# Patient Record
Sex: FEMALE | Race: WHITE | NOT HISPANIC OR LATINO | Employment: OTHER | URBAN - METROPOLITAN AREA
[De-identification: names, ages, dates, MRNs, and addresses within clinical notes are randomized per-mention and may not be internally consistent; named-entity substitution may affect disease eponyms.]

---

## 2018-02-06 ENCOUNTER — OFFICE VISIT CONVERTED (OUTPATIENT)
Dept: INTERNAL MEDICINE | Facility: CLINIC | Age: 78
End: 2018-02-06
Attending: INTERNAL MEDICINE

## 2018-02-16 ENCOUNTER — OFFICE VISIT CONVERTED (OUTPATIENT)
Dept: INTERNAL MEDICINE | Facility: CLINIC | Age: 78
End: 2018-02-16
Attending: INTERNAL MEDICINE

## 2018-02-16 ENCOUNTER — CONVERSION ENCOUNTER (OUTPATIENT)
Dept: INTERNAL MEDICINE | Facility: CLINIC | Age: 78
End: 2018-02-16

## 2018-04-30 ENCOUNTER — CONVERSION ENCOUNTER (OUTPATIENT)
Dept: INTERNAL MEDICINE | Facility: CLINIC | Age: 78
End: 2018-04-30

## 2018-04-30 ENCOUNTER — OFFICE VISIT CONVERTED (OUTPATIENT)
Dept: INTERNAL MEDICINE | Facility: CLINIC | Age: 78
End: 2018-04-30
Attending: INTERNAL MEDICINE

## 2018-07-30 ENCOUNTER — OFFICE VISIT CONVERTED (OUTPATIENT)
Dept: INTERNAL MEDICINE | Facility: CLINIC | Age: 78
End: 2018-07-30
Attending: INTERNAL MEDICINE

## 2018-10-30 ENCOUNTER — CONVERSION ENCOUNTER (OUTPATIENT)
Dept: INTERNAL MEDICINE | Facility: CLINIC | Age: 78
End: 2018-10-30

## 2018-10-30 ENCOUNTER — OFFICE VISIT CONVERTED (OUTPATIENT)
Dept: INTERNAL MEDICINE | Facility: CLINIC | Age: 78
End: 2018-10-30
Attending: INTERNAL MEDICINE

## 2019-02-01 ENCOUNTER — HOSPITAL ENCOUNTER (OUTPATIENT)
Dept: OTHER | Facility: HOSPITAL | Age: 79
Discharge: HOME | End: 2019-02-01
Attending: INTERNAL MEDICINE

## 2019-02-01 ENCOUNTER — OFFICE VISIT CONVERTED (OUTPATIENT)
Dept: INTERNAL MEDICINE | Facility: CLINIC | Age: 79
End: 2019-02-01
Attending: INTERNAL MEDICINE

## 2019-02-01 LAB
ALBUMIN SERPL-MCNC: 4.1 G/DL (ref 3.5–5)
ALBUMIN/GLOB SERPL: 1.1 {RATIO} (ref 1.4–2.6)
ALP SERPL-CCNC: 80 U/L (ref 43–160)
ALT SERPL-CCNC: 13 U/L (ref 10–40)
ANION GAP SERPL CALC-SCNC: 16 MMOL/L (ref 8–19)
AST SERPL-CCNC: 19 U/L (ref 15–50)
BASOPHILS # BLD AUTO: 0.05 10*3/UL (ref 0–0.2)
BASOPHILS NFR BLD AUTO: 0.54 % (ref 0–3)
BILIRUB SERPL-MCNC: 0.34 MG/DL (ref 0.2–1.3)
BUN SERPL-MCNC: 18 MG/DL (ref 5–25)
BUN/CREAT SERPL: 25 {RATIO} (ref 6–20)
CALCIUM SERPL-MCNC: 9.2 MG/DL (ref 8.7–10.4)
CHLORIDE SERPL-SCNC: 103 MMOL/L (ref 99–111)
CHOLEST SERPL-MCNC: 151 MG/DL (ref 107–200)
CHOLEST/HDLC SERPL: 2.6 {RATIO} (ref 3–6)
CONV CO2: 27 MMOL/L (ref 22–32)
CONV TOTAL PROTEIN: 7.7 G/DL (ref 6.3–8.2)
CREAT UR-MCNC: 0.73 MG/DL (ref 0.5–0.9)
EOSINOPHIL # BLD AUTO: 0.27 10*3/UL (ref 0–0.7)
EOSINOPHIL # BLD AUTO: 3.11 % (ref 0–7)
ERYTHROCYTE [DISTWIDTH] IN BLOOD BY AUTOMATED COUNT: 12.8 % (ref 11.5–14.5)
GFR SERPLBLD BASED ON 1.73 SQ M-ARVRAT: >60 ML/MIN/{1.73_M2}
GLOBULIN UR ELPH-MCNC: 3.6 G/DL (ref 2–3.5)
GLUCOSE SERPL-MCNC: 93 MG/DL (ref 65–99)
HBA1C MFR BLD: 13.5 G/DL (ref 12–16)
HCT VFR BLD AUTO: 39.5 % (ref 37–47)
HDLC SERPL-MCNC: 59 MG/DL (ref 40–60)
LDLC SERPL CALC-MCNC: 75 MG/DL (ref 70–100)
LYMPHOCYTES # BLD AUTO: 3.64 10*3/UL (ref 1–5)
MCH RBC QN AUTO: 32.1 PG (ref 27–31)
MCHC RBC AUTO-ENTMCNC: 34.1 G/DL (ref 33–37)
MCV RBC AUTO: 94.2 FL (ref 81–99)
MONOCYTES # BLD AUTO: 0.86 10*3/UL (ref 0.2–1.2)
MONOCYTES NFR BLD AUTO: 9.7 % (ref 3–10)
NEUTROPHILS # BLD AUTO: 4.01 10*3/UL (ref 2–8)
NEUTROPHILS NFR BLD AUTO: 45.4 % (ref 30–85)
NRBC BLD AUTO-RTO: 0 % (ref 0–0.01)
OSMOLALITY SERPL CALC.SUM OF ELEC: 296 MOSM/KG (ref 273–304)
PLATELET # BLD AUTO: 206 10*3/UL (ref 130–400)
PMV BLD AUTO: 6.7 FL (ref 7.4–10.4)
POTASSIUM SERPL-SCNC: 4.1 MMOL/L (ref 3.5–5.3)
RBC # BLD AUTO: 4.19 10*6/UL (ref 4.2–5.4)
SODIUM SERPL-SCNC: 142 MMOL/L (ref 135–147)
T4 FREE SERPL-MCNC: 1.2 NG/DL (ref 0.9–1.8)
TRIGL SERPL-MCNC: 84 MG/DL (ref 40–150)
TSH SERPL-ACNC: 3.83 M[IU]/L (ref 0.27–4.2)
VARIANT LYMPHS NFR BLD MANUAL: 41.3 % (ref 20–45)
VLDLC SERPL-MCNC: 17 MG/DL (ref 5–37)
WBC # BLD AUTO: 8.83 10*3/UL (ref 4.8–10.8)

## 2019-05-02 ENCOUNTER — OFFICE VISIT CONVERTED (OUTPATIENT)
Dept: INTERNAL MEDICINE | Facility: CLINIC | Age: 79
End: 2019-05-02
Attending: INTERNAL MEDICINE

## 2019-09-09 ENCOUNTER — HOSPITAL ENCOUNTER (OUTPATIENT)
Dept: OTHER | Facility: HOSPITAL | Age: 79
Discharge: HOME | End: 2019-09-09
Attending: INTERNAL MEDICINE

## 2019-09-09 ENCOUNTER — OFFICE VISIT CONVERTED (OUTPATIENT)
Dept: INTERNAL MEDICINE | Facility: CLINIC | Age: 79
End: 2019-09-09
Attending: INTERNAL MEDICINE

## 2019-09-09 LAB
ALBUMIN SERPL-MCNC: 4.3 G/DL (ref 3.5–5)
ALBUMIN/GLOB SERPL: 1.2 {RATIO} (ref 1.4–2.6)
ALP SERPL-CCNC: 72 U/L (ref 43–160)
ALT SERPL-CCNC: 14 U/L (ref 10–40)
ANION GAP SERPL CALC-SCNC: 15 MMOL/L (ref 8–19)
AST SERPL-CCNC: 18 U/L (ref 15–50)
BASOPHILS # BLD AUTO: 0.07 10*3/UL (ref 0–0.2)
BASOPHILS NFR BLD AUTO: 0.7 % (ref 0–3)
BILIRUB SERPL-MCNC: 0.3 MG/DL (ref 0.2–1.3)
BUN SERPL-MCNC: 21 MG/DL (ref 5–25)
BUN/CREAT SERPL: 24 {RATIO} (ref 6–20)
CALCIUM SERPL-MCNC: 9.6 MG/DL (ref 8.7–10.4)
CHLORIDE SERPL-SCNC: 101 MMOL/L (ref 99–111)
CHOLEST SERPL-MCNC: 146 MG/DL (ref 107–200)
CHOLEST/HDLC SERPL: 2.7 {RATIO} (ref 3–6)
CONV ABS IMM GRAN: 0.02 10*3/UL (ref 0–0.2)
CONV CO2: 26 MMOL/L (ref 22–32)
CONV IMMATURE GRAN: 0.2 % (ref 0–1.8)
CONV TOTAL PROTEIN: 7.8 G/DL (ref 6.3–8.2)
CREAT UR-MCNC: 0.87 MG/DL (ref 0.5–0.9)
DEPRECATED RDW RBC AUTO: 49.9 FL (ref 36.4–46.3)
EOSINOPHIL # BLD AUTO: 0.34 10*3/UL (ref 0–0.7)
EOSINOPHIL # BLD AUTO: 3.6 % (ref 0–7)
ERYTHROCYTE [DISTWIDTH] IN BLOOD BY AUTOMATED COUNT: 14.2 % (ref 11.7–14.4)
GFR SERPLBLD BASED ON 1.73 SQ M-ARVRAT: >60 ML/MIN/{1.73_M2}
GLOBULIN UR ELPH-MCNC: 3.5 G/DL (ref 2–3.5)
GLUCOSE SERPL-MCNC: 121 MG/DL (ref 65–99)
HCT VFR BLD AUTO: 40.9 % (ref 37–47)
HDLC SERPL-MCNC: 55 MG/DL (ref 40–60)
HGB BLD-MCNC: 13.1 G/DL (ref 12–16)
LDLC SERPL CALC-MCNC: 69 MG/DL (ref 70–100)
LYMPHOCYTES # BLD AUTO: 4.12 10*3/UL (ref 1–5)
LYMPHOCYTES NFR BLD AUTO: 43.6 % (ref 20–45)
MCH RBC QN AUTO: 31.1 PG (ref 27–31)
MCHC RBC AUTO-ENTMCNC: 32 G/DL (ref 33–37)
MCV RBC AUTO: 97.1 FL (ref 81–99)
MONOCYTES # BLD AUTO: 0.93 10*3/UL (ref 0.2–1.2)
MONOCYTES NFR BLD AUTO: 9.8 % (ref 3–10)
NEUTROPHILS # BLD AUTO: 3.97 10*3/UL (ref 2–8)
NEUTROPHILS NFR BLD AUTO: 42.1 % (ref 30–85)
NRBC CBCN: 0 % (ref 0–0.7)
OSMOLALITY SERPL CALC.SUM OF ELEC: 290 MOSM/KG (ref 273–304)
PLATELET # BLD AUTO: 190 10*3/UL (ref 130–400)
PMV BLD AUTO: 9.3 FL (ref 9.4–12.3)
POTASSIUM SERPL-SCNC: 4.1 MMOL/L (ref 3.5–5.3)
RBC # BLD AUTO: 4.21 10*6/UL (ref 4.2–5.4)
SODIUM SERPL-SCNC: 138 MMOL/L (ref 135–147)
T4 FREE SERPL-MCNC: 1.3 NG/DL (ref 0.9–1.8)
TRIGL SERPL-MCNC: 112 MG/DL (ref 40–150)
TSH SERPL-ACNC: 1.97 M[IU]/L (ref 0.27–4.2)
VIT B12 SERPL-MCNC: 1843 PG/ML (ref 211–911)
VLDLC SERPL-MCNC: 22 MG/DL (ref 5–37)
WBC # BLD AUTO: 9.45 10*3/UL (ref 4.8–10.8)

## 2019-10-14 ENCOUNTER — HOSPITAL ENCOUNTER (OUTPATIENT)
Dept: OTHER | Facility: HOSPITAL | Age: 79
Discharge: HOME | End: 2019-10-14
Attending: PHYSICIAN ASSISTANT

## 2019-10-14 ENCOUNTER — OFFICE VISIT CONVERTED (OUTPATIENT)
Dept: INTERNAL MEDICINE | Facility: CLINIC | Age: 79
End: 2019-10-14
Attending: PHYSICIAN ASSISTANT

## 2019-10-15 ENCOUNTER — HOSPITAL ENCOUNTER (OUTPATIENT)
Dept: CARDIOLOGY | Facility: HOSPITAL | Age: 79
Discharge: HOME | End: 2019-10-15
Attending: PHYSICIAN ASSISTANT

## 2019-10-29 ENCOUNTER — HOSPITAL ENCOUNTER (OUTPATIENT)
Dept: MAMMOGRAPHY | Facility: HOSPITAL | Age: 79
Discharge: HOME | End: 2019-10-29
Attending: INTERNAL MEDICINE

## 2019-12-06 ENCOUNTER — OFFICE VISIT CONVERTED (OUTPATIENT)
Dept: INTERNAL MEDICINE | Facility: CLINIC | Age: 79
End: 2019-12-06
Attending: INTERNAL MEDICINE

## 2019-12-06 ENCOUNTER — HOSPITAL ENCOUNTER (OUTPATIENT)
Dept: OTHER | Facility: HOSPITAL | Age: 79
Discharge: HOME | End: 2019-12-06
Attending: INTERNAL MEDICINE

## 2019-12-18 ENCOUNTER — HOSPITAL ENCOUNTER (OUTPATIENT)
Dept: ULTRASOUND IMAGING | Facility: HOSPITAL | Age: 79
Discharge: HOME | End: 2019-12-18
Attending: INTERNAL MEDICINE

## 2019-12-18 ENCOUNTER — OFFICE VISIT CONVERTED (OUTPATIENT)
Dept: INTERNAL MEDICINE | Facility: CLINIC | Age: 79
End: 2019-12-18
Attending: INTERNAL MEDICINE

## 2019-12-18 ENCOUNTER — CONVERSION ENCOUNTER (OUTPATIENT)
Dept: INTERNAL MEDICINE | Facility: CLINIC | Age: 79
End: 2019-12-18

## 2019-12-31 ENCOUNTER — HOSPITAL ENCOUNTER (OUTPATIENT)
Dept: PET IMAGING | Facility: HOSPITAL | Age: 79
Discharge: HOME | End: 2019-12-31
Attending: INTERNAL MEDICINE

## 2020-01-01 ENCOUNTER — OFFICE VISIT CONVERTED (OUTPATIENT)
Dept: INTERNAL MEDICINE | Facility: CLINIC | Age: 80
End: 2020-01-01
Attending: INTERNAL MEDICINE

## 2020-01-01 ENCOUNTER — HOSPITAL ENCOUNTER (OUTPATIENT)
Dept: OTHER | Facility: HOSPITAL | Age: 80
Discharge: HOME | End: 2020-12-04
Attending: INTERNAL MEDICINE

## 2020-01-01 ENCOUNTER — CONVERSION ENCOUNTER (OUTPATIENT)
Dept: INTERNAL MEDICINE | Facility: CLINIC | Age: 80
End: 2020-01-01

## 2020-01-01 ENCOUNTER — HOSPITAL ENCOUNTER (OUTPATIENT)
Dept: OTHER | Facility: HOSPITAL | Age: 80
Discharge: HOME | End: 2020-12-29
Attending: INTERNAL MEDICINE

## 2020-01-01 LAB
ALBUMIN SERPL-MCNC: 4.1 G/DL (ref 3.5–5)
ALBUMIN/GLOB SERPL: 1.2 {RATIO} (ref 1.4–2.6)
ALP SERPL-CCNC: 80 U/L (ref 43–160)
ALT SERPL-CCNC: 13 U/L (ref 10–40)
ANION GAP SERPL CALC-SCNC: 14 MMOL/L (ref 8–19)
APPEARANCE UR: ABNORMAL
AST SERPL-CCNC: 22 U/L (ref 15–50)
BACTERIA UR CULT: NORMAL
BASOPHILS # BLD AUTO: 0.07 10*3/UL (ref 0–0.2)
BASOPHILS NFR BLD AUTO: 0.7 % (ref 0–3)
BILIRUB SERPL-MCNC: 0.37 MG/DL (ref 0.2–1.3)
BILIRUB UR QL: NEGATIVE
BUN SERPL-MCNC: 14 MG/DL (ref 5–25)
BUN/CREAT SERPL: 20 {RATIO} (ref 6–20)
CALCIUM SERPL-MCNC: 10 MG/DL (ref 8.7–10.4)
CHLORIDE SERPL-SCNC: 104 MMOL/L (ref 99–111)
COLOR UR: YELLOW
CONV ABS IMM GRAN: 0.03 10*3/UL (ref 0–0.2)
CONV BACTERIA: NEGATIVE
CONV CO2: 28 MMOL/L (ref 22–32)
CONV COLLECTION SOURCE (UA): ABNORMAL
CONV CRYSTALS: ABNORMAL /[HPF]
CONV HYALINE CASTS IN URINE MICRO: ABNORMAL /[LPF]
CONV IMMATURE GRAN: 0.3 % (ref 0–1.8)
CONV TOTAL PROTEIN: 7.4 G/DL (ref 6.3–8.2)
CONV UROBILINOGEN IN URINE BY AUTOMATED TEST STRIP: 0.2 {EHRLICHU}/DL (ref 0.1–1)
CREAT UR-MCNC: 0.7 MG/DL (ref 0.5–0.9)
DEPRECATED RDW RBC AUTO: 48 FL (ref 36.4–46.3)
DSDNA AB SER-ACNC: NEGATIVE [IU]/ML
ENA AB SER IA-ACNC: NEGATIVE {RATIO}
EOSINOPHIL # BLD AUTO: 0.31 10*3/UL (ref 0–0.7)
EOSINOPHIL # BLD AUTO: 3.1 % (ref 0–7)
ERYTHROCYTE [DISTWIDTH] IN BLOOD BY AUTOMATED COUNT: 13.2 % (ref 11.7–14.4)
GFR SERPLBLD BASED ON 1.73 SQ M-ARVRAT: >60 ML/MIN/{1.73_M2}
GLOBULIN UR ELPH-MCNC: 3.3 G/DL (ref 2–3.5)
GLUCOSE SERPL-MCNC: 98 MG/DL (ref 65–99)
GLUCOSE UR QL: NEGATIVE MG/DL
HCT VFR BLD AUTO: 41.1 % (ref 37–47)
HGB BLD-MCNC: 13.2 G/DL (ref 12–16)
HGB UR QL STRIP: NEGATIVE
INR PPP: 2.45 (ref 2–3)
KETONES UR QL STRIP: NEGATIVE MG/DL
LEUKOCYTE ESTERASE UR QL STRIP: ABNORMAL
LYMPHOCYTES # BLD AUTO: 3.97 10*3/UL (ref 1–5)
LYMPHOCYTES NFR BLD AUTO: 39.9 % (ref 20–45)
MCH RBC QN AUTO: 31.7 PG (ref 27–31)
MCHC RBC AUTO-ENTMCNC: 32.1 G/DL (ref 33–37)
MCV RBC AUTO: 98.6 FL (ref 81–99)
MONOCYTES # BLD AUTO: 1.04 10*3/UL (ref 0.2–1.2)
MONOCYTES NFR BLD AUTO: 10.5 % (ref 3–10)
NEUTROPHILS # BLD AUTO: 4.53 10*3/UL (ref 2–8)
NEUTROPHILS NFR BLD AUTO: 45.5 % (ref 30–85)
NITRITE UR QL STRIP: NEGATIVE
NRBC CBCN: 0 % (ref 0–0.7)
OSMOLALITY SERPL CALC.SUM OF ELEC: 296 MOSM/KG (ref 273–304)
PH UR STRIP.AUTO: 6 [PH] (ref 5–8)
PLATELET # BLD AUTO: 201 10*3/UL (ref 130–400)
PMV BLD AUTO: 9.2 FL (ref 9.4–12.3)
POTASSIUM SERPL-SCNC: 3.3 MMOL/L (ref 3.5–5.3)
PROT UR QL: NEGATIVE MG/DL
PROTHROMBIN TIME: 24 S (ref 9.4–12)
RBC # BLD AUTO: 4.17 10*6/UL (ref 4.2–5.4)
RBC #/AREA URNS HPF: ABNORMAL /[HPF]
SODIUM SERPL-SCNC: 143 MMOL/L (ref 135–147)
SP GR UR: 1.01 (ref 1–1.03)
SQUAMOUS SPT QL MICRO: ABNORMAL /[HPF]
URATE SERPL-MCNC: 4.2 MG/DL (ref 2.5–7.5)
WBC # BLD AUTO: 9.95 10*3/UL (ref 4.8–10.8)
WBC #/AREA URNS HPF: ABNORMAL /[HPF]

## 2020-02-01 ENCOUNTER — HOSPITAL ENCOUNTER (OUTPATIENT)
Dept: OTHER | Facility: HOSPITAL | Age: 80
Discharge: HOME | End: 2020-02-01
Attending: INTERNAL MEDICINE

## 2020-02-01 LAB
APPEARANCE UR: ABNORMAL
BILIRUB UR QL: NEGATIVE
COLOR UR: YELLOW
CONV BACTERIA: ABNORMAL
CONV COLLECTION SOURCE (UA): ABNORMAL
CONV UROBILINOGEN IN URINE BY AUTOMATED TEST STRIP: 0.2 {EHRLICHU}/DL (ref 0.1–1)
GLUCOSE UR QL: NEGATIVE MG/DL
HGB UR QL STRIP: ABNORMAL
KETONES UR QL STRIP: NEGATIVE MG/DL
LEUKOCYTE ESTERASE UR QL STRIP: ABNORMAL
NITRITE UR QL STRIP: POSITIVE
PH UR STRIP.AUTO: 7 [PH] (ref 5–8)
PROT UR QL: NEGATIVE MG/DL
RBC #/AREA URNS HPF: ABNORMAL /[HPF]
SP GR UR: 1.01 (ref 1–1.03)
SQUAMOUS SPT QL MICRO: ABNORMAL /[HPF]
WBC #/AREA URNS HPF: ABNORMAL /[HPF]

## 2020-02-03 LAB
AMOXICILLIN+CLAV SUSC ISLT: 4
AMPICILLIN SUSC ISLT: 8
AMPICILLIN+SULBAC SUSC ISLT: <=2
BACTERIA UR CULT: ABNORMAL
CEFAZOLIN SUSC ISLT: <=4
CEFEPIME SUSC ISLT: <=1
CEFTAZIDIME SUSC ISLT: <=1
CEFTRIAXONE SUSC ISLT: <=1
CEFUROXIME ORAL SUSC ISLT: 16
CEFUROXIME PARENTER SUSC ISLT: 16
CIPROFLOXACIN SUSC ISLT: <=0.25
ERTAPENEM SUSC ISLT: <=0.5
GENTAMICIN SUSC ISLT: <=1
LEVOFLOXACIN SUSC ISLT: <=0.12
NITROFURANTOIN SUSC ISLT: <=16
TETRACYCLINE SUSC ISLT: <=1
TMP SMX SUSC ISLT: <=20
TOBRAMYCIN SUSC ISLT: <=1

## 2020-03-09 ENCOUNTER — OFFICE VISIT CONVERTED (OUTPATIENT)
Dept: INTERNAL MEDICINE | Facility: CLINIC | Age: 80
End: 2020-03-09
Attending: INTERNAL MEDICINE

## 2020-04-10 ENCOUNTER — TELEPHONE CONVERTED (OUTPATIENT)
Dept: INTERNAL MEDICINE | Facility: CLINIC | Age: 80
End: 2020-04-10
Attending: INTERNAL MEDICINE

## 2020-05-08 ENCOUNTER — HOSPITAL ENCOUNTER (OUTPATIENT)
Dept: LAB | Facility: HOSPITAL | Age: 80
Discharge: HOME | End: 2020-05-08
Attending: INTERNAL MEDICINE

## 2020-05-08 LAB
INR PPP: 1.62 (ref 2–3)
PROTHROMBIN TIME: 16.3 S (ref 9.4–12)

## 2020-05-11 ENCOUNTER — HOSPITAL ENCOUNTER (OUTPATIENT)
Dept: LAB | Facility: HOSPITAL | Age: 80
Discharge: HOME | End: 2020-05-11
Attending: INTERNAL MEDICINE

## 2020-05-11 ENCOUNTER — TELEPHONE CONVERTED (OUTPATIENT)
Dept: INTERNAL MEDICINE | Facility: CLINIC | Age: 80
End: 2020-05-11
Attending: INTERNAL MEDICINE

## 2020-05-11 LAB
ALBUMIN SERPL-MCNC: 4.1 G/DL (ref 3.5–5)
ALBUMIN/GLOB SERPL: 1.1 {RATIO} (ref 1.4–2.6)
ALP SERPL-CCNC: 95 U/L (ref 43–160)
ALT SERPL-CCNC: 13 U/L (ref 10–40)
ANION GAP SERPL CALC-SCNC: 19 MMOL/L (ref 8–19)
AST SERPL-CCNC: 18 U/L (ref 15–50)
BASOPHILS # BLD AUTO: 0.06 10*3/UL (ref 0–0.2)
BASOPHILS NFR BLD AUTO: 0.6 % (ref 0–3)
BILIRUB SERPL-MCNC: 0.21 MG/DL (ref 0.2–1.3)
BUN SERPL-MCNC: 15 MG/DL (ref 5–25)
BUN/CREAT SERPL: 21 {RATIO} (ref 6–20)
CALCIUM SERPL-MCNC: 10 MG/DL (ref 8.7–10.4)
CHLORIDE SERPL-SCNC: 106 MMOL/L (ref 99–111)
CHOLEST SERPL-MCNC: 147 MG/DL (ref 107–200)
CHOLEST/HDLC SERPL: 2.9 {RATIO} (ref 3–6)
CK SERPL-CCNC: 29 U/L (ref 35–230)
CONV ABS IMM GRAN: 0.04 10*3/UL (ref 0–0.2)
CONV CO2: 22 MMOL/L (ref 22–32)
CONV IMMATURE GRAN: 0.4 % (ref 0–1.8)
CONV TOTAL PROTEIN: 7.7 G/DL (ref 6.3–8.2)
CREAT UR-MCNC: 0.7 MG/DL (ref 0.5–0.9)
DEPRECATED RDW RBC AUTO: 56.7 FL (ref 36.4–46.3)
EOSINOPHIL # BLD AUTO: 0.19 10*3/UL (ref 0–0.7)
EOSINOPHIL # BLD AUTO: 1.9 % (ref 0–7)
ERYTHROCYTE [DISTWIDTH] IN BLOOD BY AUTOMATED COUNT: 15.6 % (ref 11.7–14.4)
ERYTHROCYTE [SEDIMENTATION RATE] IN BLOOD: 58 MM/H (ref 0–30)
GFR SERPLBLD BASED ON 1.73 SQ M-ARVRAT: >60 ML/MIN/{1.73_M2}
GLOBULIN UR ELPH-MCNC: 3.6 G/DL (ref 2–3.5)
GLUCOSE SERPL-MCNC: 102 MG/DL (ref 65–99)
HCT VFR BLD AUTO: 39.3 % (ref 37–47)
HDLC SERPL-MCNC: 50 MG/DL (ref 40–60)
HGB BLD-MCNC: 12.2 G/DL (ref 12–16)
INR PPP: 1.84 (ref 2–3)
IRON SATN MFR SERPL: 13 % (ref 20–55)
IRON SERPL-MCNC: 36 UG/DL (ref 60–170)
LDLC SERPL CALC-MCNC: 49 MG/DL (ref 70–100)
LYMPHOCYTES # BLD AUTO: 3.46 10*3/UL (ref 1–5)
LYMPHOCYTES NFR BLD AUTO: 34.6 % (ref 20–45)
MCH RBC QN AUTO: 30.9 PG (ref 27–31)
MCHC RBC AUTO-ENTMCNC: 31 G/DL (ref 33–37)
MCV RBC AUTO: 99.5 FL (ref 81–99)
MONOCYTES # BLD AUTO: 1.04 10*3/UL (ref 0.2–1.2)
MONOCYTES NFR BLD AUTO: 10.4 % (ref 3–10)
NEUTROPHILS # BLD AUTO: 5.22 10*3/UL (ref 2–8)
NEUTROPHILS NFR BLD AUTO: 52.1 % (ref 30–85)
NRBC CBCN: 0 % (ref 0–0.7)
OSMOLALITY SERPL CALC.SUM OF ELEC: 297 MOSM/KG (ref 273–304)
PLATELET # BLD AUTO: 273 10*3/UL (ref 130–400)
PMV BLD AUTO: 9 FL (ref 9.4–12.3)
POTASSIUM SERPL-SCNC: 4 MMOL/L (ref 3.5–5.3)
PROTHROMBIN TIME: 18.3 S (ref 9.4–12)
RBC # BLD AUTO: 3.95 10*6/UL (ref 4.2–5.4)
SODIUM SERPL-SCNC: 143 MMOL/L (ref 135–147)
T4 FREE SERPL-MCNC: 1.3 NG/DL (ref 0.9–1.8)
TIBC SERPL-MCNC: 275 UG/DL (ref 245–450)
TRANSFERRIN SERPL-MCNC: 192 MG/DL (ref 250–380)
TRIGL SERPL-MCNC: 240 MG/DL (ref 40–150)
TSH SERPL-ACNC: 3.09 M[IU]/L (ref 0.27–4.2)
VIT B12 SERPL-MCNC: 738 PG/ML (ref 211–911)
VLDLC SERPL-MCNC: 48 MG/DL (ref 5–37)
WBC # BLD AUTO: 10.01 10*3/UL (ref 4.8–10.8)

## 2020-05-12 LAB
DSDNA AB SER-ACNC: NEGATIVE [IU]/ML
ENA AB SER IA-ACNC: NEGATIVE {RATIO}

## 2020-05-13 LAB
CONV EBV EARLY ANTIGEN: <5 U/ML (ref 0–10.9)
CONV EBV NUCLEAR ANTIGEN: >600 U/ML (ref 0–21.9)
CONV EPSTEIN BARR VIRAL CAPSID IGM: <10 U/ML (ref 0–43.9)
CONV EPSTEIN BARR VIRUS ANTIBODY TO VIRAL CAPSID IGG: 280 U/ML (ref 0–21.9)

## 2020-05-14 ENCOUNTER — HOSPITAL ENCOUNTER (OUTPATIENT)
Dept: CARDIOLOGY | Facility: HOSPITAL | Age: 80
Discharge: HOME | End: 2020-05-14
Attending: SURGERY

## 2020-05-18 ENCOUNTER — HOSPITAL ENCOUNTER (OUTPATIENT)
Dept: LAB | Facility: HOSPITAL | Age: 80
Discharge: HOME | End: 2020-05-18
Attending: INTERNAL MEDICINE

## 2020-05-18 LAB
INR PPP: 2.33 (ref 2–3)
PROTHROMBIN TIME: 22.8 S (ref 9.4–12)

## 2020-05-20 ENCOUNTER — HOSPITAL ENCOUNTER (OUTPATIENT)
Dept: PET IMAGING | Facility: HOSPITAL | Age: 80
Discharge: HOME | End: 2020-05-20

## 2020-05-26 ENCOUNTER — HOSPITAL ENCOUNTER (OUTPATIENT)
Dept: LAB | Facility: HOSPITAL | Age: 80
Discharge: HOME | End: 2020-05-26
Attending: INTERNAL MEDICINE

## 2020-05-26 LAB
INR PPP: 3.99 (ref 2–3)
PROTHROMBIN TIME: 38.6 S (ref 9.4–12)

## 2020-06-01 ENCOUNTER — HOSPITAL ENCOUNTER (OUTPATIENT)
Dept: LAB | Facility: HOSPITAL | Age: 80
Discharge: HOME | End: 2020-06-01
Attending: INTERNAL MEDICINE

## 2020-06-01 LAB
INR PPP: 1.56 (ref 2–3)
PROTHROMBIN TIME: 15.7 S (ref 9.4–12)

## 2020-06-09 ENCOUNTER — HOSPITAL ENCOUNTER (OUTPATIENT)
Dept: LAB | Facility: HOSPITAL | Age: 80
Discharge: HOME | End: 2020-06-09
Attending: INTERNAL MEDICINE

## 2020-06-09 LAB
INR PPP: 1.57 (ref 2–3)
PROTHROMBIN TIME: 15.8 S (ref 9.4–12)

## 2020-06-11 ENCOUNTER — TELEPHONE CONVERTED (OUTPATIENT)
Dept: INTERNAL MEDICINE | Facility: CLINIC | Age: 80
End: 2020-06-11
Attending: INTERNAL MEDICINE

## 2020-06-16 ENCOUNTER — HOSPITAL ENCOUNTER (OUTPATIENT)
Dept: LAB | Facility: HOSPITAL | Age: 80
Discharge: HOME | End: 2020-06-16
Attending: INTERNAL MEDICINE

## 2020-06-16 LAB
ALBUMIN SERPL-MCNC: 4 G/DL (ref 3.5–5)
ALBUMIN/GLOB SERPL: 1.4 {RATIO} (ref 1.4–2.6)
ALP SERPL-CCNC: 81 U/L (ref 43–160)
ALT SERPL-CCNC: 21 U/L (ref 10–40)
ANION GAP SERPL CALC-SCNC: 17 MMOL/L (ref 8–19)
AST SERPL-CCNC: 23 U/L (ref 15–50)
BASOPHILS # BLD AUTO: 0.05 10*3/UL (ref 0–0.2)
BASOPHILS NFR BLD AUTO: 0.7 % (ref 0–3)
BILIRUB SERPL-MCNC: 0.24 MG/DL (ref 0.2–1.3)
BUN SERPL-MCNC: 13 MG/DL (ref 5–25)
BUN/CREAT SERPL: 14 {RATIO} (ref 6–20)
CALCIUM SERPL-MCNC: 9.6 MG/DL (ref 8.7–10.4)
CHLORIDE SERPL-SCNC: 105 MMOL/L (ref 99–111)
CONV ABS IMM GRAN: 0.05 10*3/UL (ref 0–0.2)
CONV CO2: 24 MMOL/L (ref 22–32)
CONV IMMATURE GRAN: 0.7 % (ref 0–1.8)
CONV TOTAL PROTEIN: 6.9 G/DL (ref 6.3–8.2)
CREAT UR-MCNC: 0.93 MG/DL (ref 0.5–0.9)
DEPRECATED RDW RBC AUTO: 57.1 FL (ref 36.4–46.3)
EOSINOPHIL # BLD AUTO: 0.13 10*3/UL (ref 0–0.7)
EOSINOPHIL # BLD AUTO: 1.8 % (ref 0–7)
ERYTHROCYTE [DISTWIDTH] IN BLOOD BY AUTOMATED COUNT: 15.3 % (ref 11.7–14.4)
ERYTHROCYTE [SEDIMENTATION RATE] IN BLOOD: 39 MM/H (ref 0–30)
GFR SERPLBLD BASED ON 1.73 SQ M-ARVRAT: 58 ML/MIN/{1.73_M2}
GLOBULIN UR ELPH-MCNC: 2.9 G/DL (ref 2–3.5)
GLUCOSE SERPL-MCNC: 138 MG/DL (ref 65–99)
HCT VFR BLD AUTO: 39.6 % (ref 37–47)
HGB BLD-MCNC: 12.3 G/DL (ref 12–16)
IRON SATN MFR SERPL: 13 % (ref 20–55)
IRON SERPL-MCNC: 36 UG/DL (ref 60–170)
LYMPHOCYTES # BLD AUTO: 2.68 10*3/UL (ref 1–5)
LYMPHOCYTES NFR BLD AUTO: 37.6 % (ref 20–45)
MCH RBC QN AUTO: 31.4 PG (ref 27–31)
MCHC RBC AUTO-ENTMCNC: 31.1 G/DL (ref 33–37)
MCV RBC AUTO: 101 FL (ref 81–99)
MONOCYTES # BLD AUTO: 0.92 10*3/UL (ref 0.2–1.2)
MONOCYTES NFR BLD AUTO: 12.9 % (ref 3–10)
NEUTROPHILS # BLD AUTO: 3.3 10*3/UL (ref 2–8)
NEUTROPHILS NFR BLD AUTO: 46.3 % (ref 30–85)
NRBC CBCN: 0 % (ref 0–0.7)
OSMOLALITY SERPL CALC.SUM OF ELEC: 296 MOSM/KG (ref 273–304)
PLATELET # BLD AUTO: 252 10*3/UL (ref 130–400)
PMV BLD AUTO: 9.3 FL (ref 9.4–12.3)
POTASSIUM SERPL-SCNC: 3.8 MMOL/L (ref 3.5–5.3)
RBC # BLD AUTO: 3.92 10*6/UL (ref 4.2–5.4)
SODIUM SERPL-SCNC: 142 MMOL/L (ref 135–147)
T4 FREE SERPL-MCNC: 1.2 NG/DL (ref 0.9–1.8)
TIBC SERPL-MCNC: 277 UG/DL (ref 245–450)
TRANSFERRIN SERPL-MCNC: 194 MG/DL (ref 250–380)
TSH SERPL-ACNC: 2.33 M[IU]/L (ref 0.27–4.2)
WBC # BLD AUTO: 7.13 10*3/UL (ref 4.8–10.8)

## 2020-06-24 ENCOUNTER — HOSPITAL ENCOUNTER (OUTPATIENT)
Dept: LAB | Facility: HOSPITAL | Age: 80
Discharge: HOME | End: 2020-06-24
Attending: INTERNAL MEDICINE

## 2020-06-24 LAB
INR PPP: 1.39 (ref 2–3)
PROTHROMBIN TIME: 14.2 S (ref 9.4–12)

## 2020-07-02 ENCOUNTER — HOSPITAL ENCOUNTER (OUTPATIENT)
Dept: LAB | Facility: HOSPITAL | Age: 80
Discharge: HOME | End: 2020-07-02
Attending: INTERNAL MEDICINE

## 2020-07-02 LAB
INR PPP: 2.21 (ref 2–3)
PROTHROMBIN TIME: 21.7 S (ref 9.4–12)

## 2020-07-09 ENCOUNTER — HOSPITAL ENCOUNTER (OUTPATIENT)
Dept: OTHER | Facility: HOSPITAL | Age: 80
Discharge: HOME | End: 2020-07-09
Attending: NURSE PRACTITIONER

## 2020-07-09 ENCOUNTER — OFFICE VISIT CONVERTED (OUTPATIENT)
Dept: INTERNAL MEDICINE | Facility: CLINIC | Age: 80
End: 2020-07-09
Attending: NURSE PRACTITIONER

## 2020-07-09 ENCOUNTER — CONVERSION ENCOUNTER (OUTPATIENT)
Dept: INTERNAL MEDICINE | Facility: CLINIC | Age: 80
End: 2020-07-09

## 2020-07-09 LAB
INR PPP: 2.77 (ref 2–3)
PROTHROMBIN TIME: 27 S (ref 9.4–12)

## 2020-07-23 ENCOUNTER — HOSPITAL ENCOUNTER (OUTPATIENT)
Dept: LAB | Facility: HOSPITAL | Age: 80
Discharge: HOME | End: 2020-07-23
Attending: INTERNAL MEDICINE

## 2020-07-23 LAB
INR PPP: 2.42 (ref 2–3)
PROTHROMBIN TIME: 23.6 S (ref 9.4–12)

## 2020-08-21 ENCOUNTER — HOSPITAL ENCOUNTER (OUTPATIENT)
Dept: LAB | Facility: HOSPITAL | Age: 80
Discharge: HOME | End: 2020-08-21
Attending: INTERNAL MEDICINE

## 2020-08-21 LAB
INR PPP: 2.07 (ref 2–3)
PROTHROMBIN TIME: 20.4 S (ref 9.4–12)

## 2020-08-25 ENCOUNTER — HOSPITAL ENCOUNTER (OUTPATIENT)
Dept: OTHER | Facility: HOSPITAL | Age: 80
Discharge: HOME | End: 2020-08-25
Attending: NURSE PRACTITIONER

## 2020-08-25 ENCOUNTER — OFFICE VISIT CONVERTED (OUTPATIENT)
Dept: INTERNAL MEDICINE | Facility: CLINIC | Age: 80
End: 2020-08-25
Attending: NURSE PRACTITIONER

## 2020-08-25 LAB
ALBUMIN SERPL-MCNC: 4.3 G/DL (ref 3.5–5)
ALBUMIN/GLOB SERPL: 1.5 {RATIO} (ref 1.4–2.6)
ALP SERPL-CCNC: 66 U/L (ref 43–160)
ALT SERPL-CCNC: 15 U/L (ref 10–40)
ANION GAP SERPL CALC-SCNC: 20 MMOL/L (ref 8–19)
AST SERPL-CCNC: 21 U/L (ref 15–50)
BASOPHILS # BLD AUTO: 0.07 10*3/UL (ref 0–0.2)
BASOPHILS NFR BLD AUTO: 0.8 % (ref 0–3)
BILIRUB SERPL-MCNC: 0.4 MG/DL (ref 0.2–1.3)
BNP SERPL-MCNC: 86 PG/ML (ref 0–1800)
BUN SERPL-MCNC: 23 MG/DL (ref 5–25)
BUN/CREAT SERPL: 27 {RATIO} (ref 6–20)
CALCIUM SERPL-MCNC: 10.7 MG/DL (ref 8.7–10.4)
CHLORIDE SERPL-SCNC: 103 MMOL/L (ref 99–111)
CONV ABS IMM GRAN: 0.01 10*3/UL (ref 0–0.2)
CONV CO2: 23 MMOL/L (ref 22–32)
CONV IMMATURE GRAN: 0.1 % (ref 0–1.8)
CONV TOTAL PROTEIN: 7.2 G/DL (ref 6.3–8.2)
CREAT UR-MCNC: 0.86 MG/DL (ref 0.5–0.9)
DEPRECATED RDW RBC AUTO: 55.3 FL (ref 36.4–46.3)
EOSINOPHIL # BLD AUTO: 0.19 10*3/UL (ref 0–0.7)
EOSINOPHIL # BLD AUTO: 2.3 % (ref 0–7)
ERYTHROCYTE [DISTWIDTH] IN BLOOD BY AUTOMATED COUNT: 15 % (ref 11.7–14.4)
GFR SERPLBLD BASED ON 1.73 SQ M-ARVRAT: >60 ML/MIN/{1.73_M2}
GLOBULIN UR ELPH-MCNC: 2.9 G/DL (ref 2–3.5)
GLUCOSE SERPL-MCNC: 120 MG/DL (ref 65–99)
HCT VFR BLD AUTO: 41.1 % (ref 37–47)
HGB BLD-MCNC: 13.1 G/DL (ref 12–16)
LYMPHOCYTES # BLD AUTO: 3.14 10*3/UL (ref 1–5)
LYMPHOCYTES NFR BLD AUTO: 38 % (ref 20–45)
MCH RBC QN AUTO: 31.8 PG (ref 27–31)
MCHC RBC AUTO-ENTMCNC: 31.9 G/DL (ref 33–37)
MCV RBC AUTO: 99.8 FL (ref 81–99)
MONOCYTES # BLD AUTO: 0.91 10*3/UL (ref 0.2–1.2)
MONOCYTES NFR BLD AUTO: 11 % (ref 3–10)
NEUTROPHILS # BLD AUTO: 3.94 10*3/UL (ref 2–8)
NEUTROPHILS NFR BLD AUTO: 47.8 % (ref 30–85)
NRBC CBCN: 0 % (ref 0–0.7)
OSMOLALITY SERPL CALC.SUM OF ELEC: 299 MOSM/KG (ref 273–304)
PLATELET # BLD AUTO: 201 10*3/UL (ref 130–400)
PMV BLD AUTO: 9.4 FL (ref 9.4–12.3)
POTASSIUM SERPL-SCNC: 4 MMOL/L (ref 3.5–5.3)
RBC # BLD AUTO: 4.12 10*6/UL (ref 4.2–5.4)
SODIUM SERPL-SCNC: 142 MMOL/L (ref 135–147)
TSH SERPL-ACNC: 1.34 M[IU]/L (ref 0.27–4.2)
WBC # BLD AUTO: 8.26 10*3/UL (ref 4.8–10.8)

## 2020-09-02 ENCOUNTER — OFFICE VISIT CONVERTED (OUTPATIENT)
Dept: INTERNAL MEDICINE | Facility: CLINIC | Age: 80
End: 2020-09-02
Attending: INTERNAL MEDICINE

## 2020-09-22 ENCOUNTER — HOSPITAL ENCOUNTER (OUTPATIENT)
Dept: LAB | Facility: HOSPITAL | Age: 80
Discharge: HOME | End: 2020-09-22
Attending: INTERNAL MEDICINE

## 2020-09-22 LAB
INR PPP: 2.56 (ref 2–3)
PROTHROMBIN TIME: 25 S (ref 9.4–12)

## 2020-10-22 ENCOUNTER — HOSPITAL ENCOUNTER (OUTPATIENT)
Dept: LAB | Facility: HOSPITAL | Age: 80
Discharge: HOME | End: 2020-10-22
Attending: INTERNAL MEDICINE

## 2020-10-22 LAB
INR PPP: 1.77 (ref 2–3)
PROTHROMBIN TIME: 17.6 S (ref 9.4–12)

## 2020-11-02 ENCOUNTER — HOSPITAL ENCOUNTER (OUTPATIENT)
Dept: LAB | Facility: HOSPITAL | Age: 80
Discharge: HOME | End: 2020-11-02
Attending: INTERNAL MEDICINE

## 2020-11-02 LAB
INR PPP: 2.56 (ref 2–3)
PROTHROMBIN TIME: 24.9 S (ref 9.4–12)

## 2020-11-12 ENCOUNTER — HOSPITAL ENCOUNTER (OUTPATIENT)
Dept: LAB | Facility: HOSPITAL | Age: 80
Discharge: HOME | End: 2020-11-12
Attending: INTERNAL MEDICINE

## 2020-11-12 LAB
APPEARANCE UR: CLEAR
BILIRUB UR QL: NEGATIVE
COLOR UR: YELLOW
CONV BACTERIA: NEGATIVE
CONV COLLECTION SOURCE (UA): ABNORMAL
CONV UROBILINOGEN IN URINE BY AUTOMATED TEST STRIP: 0.2 {EHRLICHU}/DL (ref 0.1–1)
GLUCOSE UR QL: NEGATIVE MG/DL
HGB UR QL STRIP: NEGATIVE
INR PPP: 2.2 (ref 2–3)
KETONES UR QL STRIP: NEGATIVE MG/DL
LEUKOCYTE ESTERASE UR QL STRIP: ABNORMAL
NITRITE UR QL STRIP: NEGATIVE
PH UR STRIP.AUTO: 6 [PH] (ref 5–8)
PROT UR QL: NEGATIVE MG/DL
PROTHROMBIN TIME: 21.7 S (ref 9.4–12)
RBC #/AREA URNS HPF: ABNORMAL /[HPF]
SP GR UR: 1.01 (ref 1–1.03)
SQUAMOUS SPT QL MICRO: ABNORMAL /[HPF]
WBC #/AREA URNS HPF: ABNORMAL /[HPF]

## 2021-01-01 ENCOUNTER — HOSPITAL ENCOUNTER (OUTPATIENT)
Dept: CT IMAGING | Facility: HOSPITAL | Age: 81
Discharge: HOME | End: 2021-06-07

## 2021-01-01 ENCOUNTER — ANTICOAGULATION VISIT (OUTPATIENT)
Dept: INTERNAL MEDICINE | Facility: CLINIC | Age: 81
End: 2021-01-01

## 2021-01-01 ENCOUNTER — APPOINTMENT (OUTPATIENT)
Dept: CT IMAGING | Facility: HOSPITAL | Age: 81
End: 2021-01-01

## 2021-01-01 ENCOUNTER — OFFICE VISIT (OUTPATIENT)
Dept: INTERNAL MEDICINE | Facility: CLINIC | Age: 81
End: 2021-01-01

## 2021-01-01 ENCOUNTER — HOSPITAL ENCOUNTER (OUTPATIENT)
Dept: CT IMAGING | Facility: HOSPITAL | Age: 81
End: 2021-01-01

## 2021-01-01 ENCOUNTER — HOSPITAL ENCOUNTER (OUTPATIENT)
Dept: OTHER | Facility: HOSPITAL | Age: 81
Discharge: HOME | End: 2021-04-14
Attending: INTERNAL MEDICINE

## 2021-01-01 ENCOUNTER — LAB (OUTPATIENT)
Dept: LAB | Facility: HOSPITAL | Age: 81
End: 2021-01-01

## 2021-01-01 ENCOUNTER — READMISSION MANAGEMENT (OUTPATIENT)
Dept: CALL CENTER | Facility: HOSPITAL | Age: 81
End: 2021-01-01

## 2021-01-01 ENCOUNTER — HOSPITAL ENCOUNTER (OUTPATIENT)
Dept: OTHER | Facility: HOSPITAL | Age: 81
Discharge: HOME | End: 2021-03-16
Attending: INTERNAL MEDICINE

## 2021-01-01 ENCOUNTER — APPOINTMENT (OUTPATIENT)
Dept: CARDIOLOGY | Facility: HOSPITAL | Age: 81
End: 2021-01-01

## 2021-01-01 ENCOUNTER — TELEPHONE (OUTPATIENT)
Dept: INTERNAL MEDICINE | Facility: CLINIC | Age: 81
End: 2021-01-01

## 2021-01-01 ENCOUNTER — CONVERSION ENCOUNTER (OUTPATIENT)
Dept: INTERNAL MEDICINE | Facility: CLINIC | Age: 81
End: 2021-01-01

## 2021-01-01 ENCOUNTER — TELEMEDICINE (OUTPATIENT)
Dept: INTERNAL MEDICINE | Facility: CLINIC | Age: 81
End: 2021-01-01

## 2021-01-01 ENCOUNTER — HOSPITAL ENCOUNTER (OUTPATIENT)
Dept: OTHER | Facility: HOSPITAL | Age: 81
Discharge: HOME | End: 2021-04-16
Attending: INTERNAL MEDICINE

## 2021-01-01 ENCOUNTER — APPOINTMENT (OUTPATIENT)
Dept: GENERAL RADIOLOGY | Facility: HOSPITAL | Age: 81
End: 2021-01-01

## 2021-01-01 ENCOUNTER — OFFICE VISIT CONVERTED (OUTPATIENT)
Dept: INTERNAL MEDICINE | Facility: CLINIC | Age: 81
End: 2021-01-01
Attending: STUDENT IN AN ORGANIZED HEALTH CARE EDUCATION/TRAINING PROGRAM

## 2021-01-01 ENCOUNTER — HOSPITAL ENCOUNTER (INPATIENT)
Facility: HOSPITAL | Age: 81
LOS: 7 days | End: 2021-07-28
Attending: EMERGENCY MEDICINE | Admitting: HOSPITALIST

## 2021-01-01 ENCOUNTER — HOSPITAL ENCOUNTER (OUTPATIENT)
Dept: OTHER | Facility: HOSPITAL | Age: 81
Discharge: HOME | End: 2021-04-30
Attending: INTERNAL MEDICINE

## 2021-01-01 ENCOUNTER — HOSPITAL ENCOUNTER (OUTPATIENT)
Dept: LAB | Facility: HOSPITAL | Age: 81
Discharge: HOME | End: 2021-05-12
Attending: INTERNAL MEDICINE

## 2021-01-01 ENCOUNTER — HOSPITAL ENCOUNTER (OUTPATIENT)
Dept: LAB | Facility: HOSPITAL | Age: 81
Discharge: HOME | End: 2021-02-17
Attending: INTERNAL MEDICINE

## 2021-01-01 ENCOUNTER — TRANSITIONAL CARE MANAGEMENT TELEPHONE ENCOUNTER (OUTPATIENT)
Dept: CALL CENTER | Facility: HOSPITAL | Age: 81
End: 2021-01-01

## 2021-01-01 ENCOUNTER — TRANSCRIBE ORDERS (OUTPATIENT)
Dept: ADMINISTRATIVE | Facility: HOSPITAL | Age: 81
End: 2021-01-01

## 2021-01-01 ENCOUNTER — CONVERSION ENCOUNTER (OUTPATIENT)
Dept: OTHER | Facility: HOSPITAL | Age: 81
End: 2021-01-01

## 2021-01-01 ENCOUNTER — APPOINTMENT (OUTPATIENT)
Dept: MRI IMAGING | Facility: HOSPITAL | Age: 81
End: 2021-01-01

## 2021-01-01 ENCOUNTER — HOSPITAL ENCOUNTER (OUTPATIENT)
Dept: LAB | Facility: HOSPITAL | Age: 81
Discharge: HOME | End: 2021-01-28
Attending: INTERNAL MEDICINE

## 2021-01-01 ENCOUNTER — HOSPITAL ENCOUNTER (OUTPATIENT)
Dept: LAB | Facility: HOSPITAL | Age: 81
Discharge: HOME | End: 2021-04-01
Attending: INTERNAL MEDICINE

## 2021-01-01 ENCOUNTER — HOSPITAL ENCOUNTER (OUTPATIENT)
Dept: VACCINE CLINIC | Facility: HOSPITAL | Age: 81
Discharge: HOME | End: 2021-03-26
Attending: INTERNAL MEDICINE

## 2021-01-01 ENCOUNTER — HOSPITAL ENCOUNTER (OUTPATIENT)
Dept: LAB | Facility: HOSPITAL | Age: 81
Discharge: HOME | End: 2021-03-01
Attending: INTERNAL MEDICINE

## 2021-01-01 ENCOUNTER — OFFICE VISIT CONVERTED (OUTPATIENT)
Dept: INTERNAL MEDICINE | Facility: CLINIC | Age: 81
End: 2021-01-01
Attending: INTERNAL MEDICINE

## 2021-01-01 ENCOUNTER — HOSPITAL ENCOUNTER (OUTPATIENT)
Dept: OTHER | Facility: HOSPITAL | Age: 81
Discharge: HOME | End: 2021-01-20
Attending: INTERNAL MEDICINE

## 2021-01-01 ENCOUNTER — HOSPITAL ENCOUNTER (INPATIENT)
Facility: HOSPITAL | Age: 81
LOS: 3 days | Discharge: HOME HEALTH | End: 2021-09-03
Attending: EMERGENCY MEDICINE | Admitting: INTERNAL MEDICINE

## 2021-01-01 ENCOUNTER — HOSPITAL ENCOUNTER (OUTPATIENT)
Dept: VACCINE CLINIC | Facility: HOSPITAL | Age: 81
Discharge: HOME | End: 2021-02-25
Attending: INTERNAL MEDICINE

## 2021-01-01 ENCOUNTER — HOSPITAL ENCOUNTER (OUTPATIENT)
Dept: LAB | Facility: HOSPITAL | Age: 81
Discharge: HOME | End: 2021-03-19
Attending: INTERNAL MEDICINE

## 2021-01-01 ENCOUNTER — CLINICAL SUPPORT (OUTPATIENT)
Dept: INTERNAL MEDICINE | Facility: CLINIC | Age: 81
End: 2021-01-01

## 2021-01-01 ENCOUNTER — HOSPITAL ENCOUNTER (INPATIENT)
Facility: HOSPITAL | Age: 81
LOS: 25 days | Discharge: HOME HEALTH | End: 2021-08-22
Attending: INTERNAL MEDICINE | Admitting: INTERNAL MEDICINE

## 2021-01-01 ENCOUNTER — HOSPITAL ENCOUNTER (OUTPATIENT)
Dept: LAB | Facility: HOSPITAL | Age: 81
Discharge: HOME | End: 2021-01-13
Attending: INTERNAL MEDICINE

## 2021-01-01 ENCOUNTER — TRANSCRIBE ORDERS (OUTPATIENT)
Dept: INTERNAL MEDICINE | Facility: CLINIC | Age: 81
End: 2021-01-01

## 2021-01-01 ENCOUNTER — HOSPITAL ENCOUNTER (OUTPATIENT)
Dept: LAB | Facility: HOSPITAL | Age: 81
Discharge: HOME | End: 2021-05-24
Attending: INTERNAL MEDICINE

## 2021-01-01 ENCOUNTER — HOSPITAL ENCOUNTER (OUTPATIENT)
Dept: OTHER | Facility: HOSPITAL | Age: 81
Discharge: HOME | End: 2021-02-03
Attending: INTERNAL MEDICINE

## 2021-01-01 ENCOUNTER — HOSPITAL ENCOUNTER (OUTPATIENT)
Dept: LAB | Facility: HOSPITAL | Age: 81
Discharge: HOME | End: 2021-01-05
Attending: INTERNAL MEDICINE

## 2021-01-01 ENCOUNTER — HOSPITAL ENCOUNTER (OUTPATIENT)
Dept: OTHER | Facility: HOSPITAL | Age: 81
Discharge: HOME | End: 2021-05-04
Attending: INTERNAL MEDICINE

## 2021-01-01 ENCOUNTER — HOSPITAL ENCOUNTER (OUTPATIENT)
Dept: OTHER | Facility: HOSPITAL | Age: 81
Discharge: HOME | End: 2021-04-09
Attending: INTERNAL MEDICINE

## 2021-01-01 VITALS
HEIGHT: 66 IN | HEART RATE: 90 BPM | TEMPERATURE: 96.9 F | WEIGHT: 141.25 LBS | RESPIRATION RATE: 16 BRPM | BODY MASS INDEX: 22.7 KG/M2 | DIASTOLIC BLOOD PRESSURE: 68 MMHG | SYSTOLIC BLOOD PRESSURE: 124 MMHG | OXYGEN SATURATION: 95 %

## 2021-01-01 VITALS
WEIGHT: 146.8 LBS | OXYGEN SATURATION: 95 % | SYSTOLIC BLOOD PRESSURE: 104 MMHG | HEIGHT: 66 IN | TEMPERATURE: 97.1 F | HEART RATE: 96 BPM | DIASTOLIC BLOOD PRESSURE: 70 MMHG | BODY MASS INDEX: 23.59 KG/M2

## 2021-01-01 VITALS
SYSTOLIC BLOOD PRESSURE: 120 MMHG | HEART RATE: 106 BPM | DIASTOLIC BLOOD PRESSURE: 68 MMHG | WEIGHT: 145.12 LBS | TEMPERATURE: 98.2 F | HEIGHT: 66 IN | BODY MASS INDEX: 23.32 KG/M2 | OXYGEN SATURATION: 96 %

## 2021-01-01 VITALS
TEMPERATURE: 97.6 F | SYSTOLIC BLOOD PRESSURE: 134 MMHG | HEART RATE: 91 BPM | OXYGEN SATURATION: 95 % | BODY MASS INDEX: 22.38 KG/M2 | HEIGHT: 66 IN | WEIGHT: 139.25 LBS | DIASTOLIC BLOOD PRESSURE: 82 MMHG

## 2021-01-01 VITALS
HEIGHT: 66 IN | OXYGEN SATURATION: 96 % | TEMPERATURE: 98.9 F | RESPIRATION RATE: 16 BRPM | DIASTOLIC BLOOD PRESSURE: 79 MMHG | SYSTOLIC BLOOD PRESSURE: 141 MMHG | HEART RATE: 89 BPM | WEIGHT: 145 LBS | BODY MASS INDEX: 23.3 KG/M2

## 2021-01-01 VITALS
HEIGHT: 66 IN | BODY MASS INDEX: 23.38 KG/M2 | SYSTOLIC BLOOD PRESSURE: 96 MMHG | DIASTOLIC BLOOD PRESSURE: 58 MMHG | WEIGHT: 145.5 LBS | HEART RATE: 88 BPM | TEMPERATURE: 97.9 F | OXYGEN SATURATION: 92 %

## 2021-01-01 VITALS
DIASTOLIC BLOOD PRESSURE: 62 MMHG | TEMPERATURE: 97 F | HEART RATE: 93 BPM | SYSTOLIC BLOOD PRESSURE: 104 MMHG | HEIGHT: 66 IN | OXYGEN SATURATION: 95 % | BODY MASS INDEX: 23.16 KG/M2 | WEIGHT: 144.12 LBS

## 2021-01-01 VITALS
HEART RATE: 90 BPM | WEIGHT: 140.5 LBS | DIASTOLIC BLOOD PRESSURE: 64 MMHG | RESPIRATION RATE: 18 BRPM | SYSTOLIC BLOOD PRESSURE: 118 MMHG | HEIGHT: 66 IN | OXYGEN SATURATION: 95 % | TEMPERATURE: 98.5 F | BODY MASS INDEX: 22.58 KG/M2

## 2021-01-01 VITALS
SYSTOLIC BLOOD PRESSURE: 102 MMHG | BODY MASS INDEX: 21.62 KG/M2 | RESPIRATION RATE: 16 BRPM | HEIGHT: 66 IN | TEMPERATURE: 97.7 F | HEART RATE: 107 BPM | WEIGHT: 134.5 LBS | DIASTOLIC BLOOD PRESSURE: 58 MMHG | DIASTOLIC BLOOD PRESSURE: 68 MMHG | OXYGEN SATURATION: 95 % | SYSTOLIC BLOOD PRESSURE: 78 MMHG

## 2021-01-01 VITALS
BODY MASS INDEX: 21.74 KG/M2 | DIASTOLIC BLOOD PRESSURE: 67 MMHG | TEMPERATURE: 97.9 F | RESPIRATION RATE: 20 BRPM | OXYGEN SATURATION: 97 % | SYSTOLIC BLOOD PRESSURE: 118 MMHG | WEIGHT: 130.51 LBS | HEART RATE: 95 BPM | HEIGHT: 65 IN

## 2021-01-01 VITALS
TEMPERATURE: 97.9 F | DIASTOLIC BLOOD PRESSURE: 72 MMHG | SYSTOLIC BLOOD PRESSURE: 141 MMHG | HEIGHT: 66 IN | BODY MASS INDEX: 22.66 KG/M2 | RESPIRATION RATE: 16 BRPM | WEIGHT: 141 LBS | OXYGEN SATURATION: 95 % | HEART RATE: 84 BPM

## 2021-01-01 VITALS
BODY MASS INDEX: 22.08 KG/M2 | RESPIRATION RATE: 16 BRPM | TEMPERATURE: 97 F | HEART RATE: 80 BPM | HEIGHT: 66 IN | WEIGHT: 137.37 LBS | SYSTOLIC BLOOD PRESSURE: 132 MMHG | OXYGEN SATURATION: 97 % | DIASTOLIC BLOOD PRESSURE: 78 MMHG

## 2021-01-01 VITALS
BODY MASS INDEX: 23.63 KG/M2 | TEMPERATURE: 97.6 F | SYSTOLIC BLOOD PRESSURE: 132 MMHG | HEIGHT: 66 IN | DIASTOLIC BLOOD PRESSURE: 70 MMHG | WEIGHT: 147 LBS | OXYGEN SATURATION: 94 % | HEART RATE: 93 BPM

## 2021-01-01 VITALS
SYSTOLIC BLOOD PRESSURE: 106 MMHG | DIASTOLIC BLOOD PRESSURE: 60 MMHG | TEMPERATURE: 97.5 F | WEIGHT: 142.25 LBS | HEIGHT: 66 IN | BODY MASS INDEX: 22.86 KG/M2 | HEART RATE: 101 BPM | OXYGEN SATURATION: 96 %

## 2021-01-01 VITALS
BODY MASS INDEX: 22.98 KG/M2 | HEIGHT: 66 IN | HEART RATE: 83 BPM | OXYGEN SATURATION: 96 % | SYSTOLIC BLOOD PRESSURE: 118 MMHG | DIASTOLIC BLOOD PRESSURE: 64 MMHG | TEMPERATURE: 97.5 F | WEIGHT: 143 LBS

## 2021-01-01 VITALS
HEART RATE: 117 BPM | WEIGHT: 141.09 LBS | HEIGHT: 62 IN | TEMPERATURE: 97.2 F | RESPIRATION RATE: 18 BRPM | DIASTOLIC BLOOD PRESSURE: 74 MMHG | OXYGEN SATURATION: 94 % | BODY MASS INDEX: 25.96 KG/M2 | SYSTOLIC BLOOD PRESSURE: 124 MMHG

## 2021-01-01 VITALS
HEART RATE: 81 BPM | RESPIRATION RATE: 16 BRPM | DIASTOLIC BLOOD PRESSURE: 58 MMHG | WEIGHT: 133.12 LBS | HEIGHT: 66 IN | OXYGEN SATURATION: 98 % | BODY MASS INDEX: 21.39 KG/M2 | TEMPERATURE: 97.3 F | SYSTOLIC BLOOD PRESSURE: 92 MMHG

## 2021-01-01 VITALS
TEMPERATURE: 97.1 F | SYSTOLIC BLOOD PRESSURE: 124 MMHG | HEIGHT: 66 IN | WEIGHT: 136.37 LBS | HEART RATE: 94 BPM | OXYGEN SATURATION: 97 % | BODY MASS INDEX: 21.92 KG/M2 | RESPIRATION RATE: 16 BRPM | DIASTOLIC BLOOD PRESSURE: 84 MMHG

## 2021-01-01 VITALS
HEIGHT: 66 IN | HEART RATE: 90 BPM | BODY MASS INDEX: 22.98 KG/M2 | OXYGEN SATURATION: 96 % | RESPIRATION RATE: 15 BRPM | TEMPERATURE: 97.3 F | WEIGHT: 143 LBS | DIASTOLIC BLOOD PRESSURE: 70 MMHG | SYSTOLIC BLOOD PRESSURE: 128 MMHG

## 2021-01-01 VITALS
DIASTOLIC BLOOD PRESSURE: 67 MMHG | HEIGHT: 65 IN | TEMPERATURE: 97.9 F | WEIGHT: 132.5 LBS | SYSTOLIC BLOOD PRESSURE: 128 MMHG | HEART RATE: 92 BPM | RESPIRATION RATE: 18 BRPM | OXYGEN SATURATION: 93 % | BODY MASS INDEX: 22.08 KG/M2

## 2021-01-01 VITALS
DIASTOLIC BLOOD PRESSURE: 70 MMHG | HEART RATE: 101 BPM | SYSTOLIC BLOOD PRESSURE: 114 MMHG | BODY MASS INDEX: 22.54 KG/M2 | TEMPERATURE: 98 F | WEIGHT: 140.25 LBS | OXYGEN SATURATION: 94 % | HEIGHT: 66 IN

## 2021-01-01 VITALS
OXYGEN SATURATION: 95 % | DIASTOLIC BLOOD PRESSURE: 68 MMHG | HEIGHT: 66 IN | WEIGHT: 147.25 LBS | HEART RATE: 104 BPM | SYSTOLIC BLOOD PRESSURE: 120 MMHG | BODY MASS INDEX: 23.66 KG/M2 | TEMPERATURE: 97.3 F

## 2021-01-01 VITALS
HEART RATE: 89 BPM | WEIGHT: 144 LBS | HEIGHT: 66 IN | DIASTOLIC BLOOD PRESSURE: 72 MMHG | TEMPERATURE: 97.7 F | BODY MASS INDEX: 23.14 KG/M2 | SYSTOLIC BLOOD PRESSURE: 110 MMHG | OXYGEN SATURATION: 96 %

## 2021-01-01 VITALS
TEMPERATURE: 98.5 F | WEIGHT: 142.5 LBS | HEART RATE: 90 BPM | BODY MASS INDEX: 25.25 KG/M2 | SYSTOLIC BLOOD PRESSURE: 112 MMHG | OXYGEN SATURATION: 95 % | RESPIRATION RATE: 16 BRPM | DIASTOLIC BLOOD PRESSURE: 74 MMHG | HEIGHT: 63 IN

## 2021-01-01 VITALS
HEART RATE: 91 BPM | SYSTOLIC BLOOD PRESSURE: 102 MMHG | BODY MASS INDEX: 23.14 KG/M2 | HEIGHT: 66 IN | WEIGHT: 144 LBS | TEMPERATURE: 98.1 F | DIASTOLIC BLOOD PRESSURE: 60 MMHG | OXYGEN SATURATION: 96 %

## 2021-01-01 VITALS
HEART RATE: 111 BPM | HEIGHT: 65 IN | OXYGEN SATURATION: 96 % | BODY MASS INDEX: 22.49 KG/M2 | TEMPERATURE: 97.3 F | WEIGHT: 135 LBS | DIASTOLIC BLOOD PRESSURE: 78 MMHG | SYSTOLIC BLOOD PRESSURE: 122 MMHG

## 2021-01-01 DIAGNOSIS — Z79.01 LONG TERM (CURRENT) USE OF ANTICOAGULANTS: ICD-10-CM

## 2021-01-01 DIAGNOSIS — R50.9 FEVER, UNSPECIFIED FEVER CAUSE: Primary | ICD-10-CM

## 2021-01-01 DIAGNOSIS — N39.0 RECURRENT UTI: ICD-10-CM

## 2021-01-01 DIAGNOSIS — R13.12 OROPHARYNGEAL DYSPHAGIA: ICD-10-CM

## 2021-01-01 DIAGNOSIS — F02.80 LATE ONSET ALZHEIMER'S DEMENTIA WITHOUT BEHAVIORAL DISTURBANCE (HCC): ICD-10-CM

## 2021-01-01 DIAGNOSIS — J18.9 PNEUMONIA DUE TO INFECTIOUS ORGANISM, UNSPECIFIED LATERALITY, UNSPECIFIED PART OF LUNG: ICD-10-CM

## 2021-01-01 DIAGNOSIS — Z74.09 IMMOBILITY: ICD-10-CM

## 2021-01-01 DIAGNOSIS — E78.2 MIXED HYPERLIPIDEMIA: ICD-10-CM

## 2021-01-01 DIAGNOSIS — E55.9 VITAMIN D DEFICIENCY: ICD-10-CM

## 2021-01-01 DIAGNOSIS — G30.1 LATE ONSET ALZHEIMER'S DEMENTIA WITHOUT BEHAVIORAL DISTURBANCE (HCC): ICD-10-CM

## 2021-01-01 DIAGNOSIS — F03.90 DEMENTIA WITHOUT BEHAVIORAL DISTURBANCE, UNSPECIFIED DEMENTIA TYPE: ICD-10-CM

## 2021-01-01 DIAGNOSIS — F03.91 DEMENTIA WITH BEHAVIORAL DISTURBANCE, UNSPECIFIED DEMENTIA TYPE: Primary | ICD-10-CM

## 2021-01-01 DIAGNOSIS — R26.2 DIFFICULTY WALKING: ICD-10-CM

## 2021-01-01 DIAGNOSIS — R26.2 DIFFICULTY IN WALKING: ICD-10-CM

## 2021-01-01 DIAGNOSIS — I10 ESSENTIAL HYPERTENSION: Primary | ICD-10-CM

## 2021-01-01 DIAGNOSIS — R41.0 DELIRIUM: ICD-10-CM

## 2021-01-01 DIAGNOSIS — R26.89 BALANCE DISORDER: ICD-10-CM

## 2021-01-01 DIAGNOSIS — J18.9 HCAP (HEALTHCARE-ASSOCIATED PNEUMONIA): ICD-10-CM

## 2021-01-01 DIAGNOSIS — Z79.01 LONG TERM (CURRENT) USE OF ANTICOAGULANTS: Primary | ICD-10-CM

## 2021-01-01 DIAGNOSIS — Z78.9 DECREASED ACTIVITIES OF DAILY LIVING (ADL): Primary | ICD-10-CM

## 2021-01-01 DIAGNOSIS — Z51.5 END OF LIFE CARE: Primary | ICD-10-CM

## 2021-01-01 DIAGNOSIS — Z23 INFLUENZA VACCINE NEEDED: ICD-10-CM

## 2021-01-01 DIAGNOSIS — F03.91 DEMENTIA WITH BEHAVIORAL DISTURBANCE, UNSPECIFIED DEMENTIA TYPE: ICD-10-CM

## 2021-01-01 DIAGNOSIS — C81.90 HODGKIN LYMPHOMA, UNSPECIFIED HODGKIN LYMPHOMA TYPE, UNSPECIFIED BODY REGION (HCC): ICD-10-CM

## 2021-01-01 DIAGNOSIS — I10 ESSENTIAL HYPERTENSION: ICD-10-CM

## 2021-01-01 DIAGNOSIS — A41.9 SEPSIS, DUE TO UNSPECIFIED ORGANISM, UNSPECIFIED WHETHER ACUTE ORGAN DYSFUNCTION PRESENT (HCC): Primary | ICD-10-CM

## 2021-01-01 DIAGNOSIS — C81.90 HODGKIN LYMPHOMA, UNSPECIFIED HODGKIN LYMPHOMA TYPE, UNSPECIFIED BODY REGION (HCC): Primary | ICD-10-CM

## 2021-01-01 DIAGNOSIS — Z78.9 DECREASED ACTIVITIES OF DAILY LIVING (ADL): ICD-10-CM

## 2021-01-01 DIAGNOSIS — R31.9 URINARY TRACT INFECTION WITH HEMATURIA, SITE UNSPECIFIED: ICD-10-CM

## 2021-01-01 DIAGNOSIS — N81.10 BLADDER PROLAPSE, FEMALE, ACQUIRED: ICD-10-CM

## 2021-01-01 DIAGNOSIS — N39.0 URINARY TRACT INFECTION WITH HEMATURIA, SITE UNSPECIFIED: ICD-10-CM

## 2021-01-01 DIAGNOSIS — R52 BODY ACHES: ICD-10-CM

## 2021-01-01 DIAGNOSIS — E43 SEVERE MALNUTRITION (HCC): ICD-10-CM

## 2021-01-01 DIAGNOSIS — J18.9 HCAP (HEALTHCARE-ASSOCIATED PNEUMONIA): Primary | ICD-10-CM

## 2021-01-01 DIAGNOSIS — N81.10 FEMALE BLADDER PROLAPSE: Primary | ICD-10-CM

## 2021-01-01 DIAGNOSIS — R41.82 ALTERED MENTAL STATUS, UNSPECIFIED ALTERED MENTAL STATUS TYPE: ICD-10-CM

## 2021-01-01 DIAGNOSIS — R10.9 FLANK PAIN: Primary | ICD-10-CM

## 2021-01-01 DIAGNOSIS — I82.5Y9 CHRONIC DEEP VEIN THROMBOSIS (DVT) OF PROXIMAL VEIN OF LOWER EXTREMITY, UNSPECIFIED LATERALITY (HCC): ICD-10-CM

## 2021-01-01 DIAGNOSIS — J18.9 PNEUMONIA DUE TO INFECTIOUS ORGANISM, UNSPECIFIED LATERALITY, UNSPECIFIED PART OF LUNG: Primary | ICD-10-CM

## 2021-01-01 DIAGNOSIS — K21.9 GASTROESOPHAGEAL REFLUX DISEASE, UNSPECIFIED WHETHER ESOPHAGITIS PRESENT: ICD-10-CM

## 2021-01-01 LAB
1,25(OH)2D SERPL-MCNC: 37.8 PG/ML (ref 19.9–79.3)
ALBUMIN SERPL-MCNC: 2.5 G/DL (ref 3.5–5.2)
ALBUMIN SERPL-MCNC: 2.6 G/DL (ref 3.5–5.2)
ALBUMIN SERPL-MCNC: 2.7 G/DL (ref 3.5–5.2)
ALBUMIN SERPL-MCNC: 2.9 G/DL (ref 3.5–5.2)
ALBUMIN SERPL-MCNC: 3 G/DL (ref 3.5–5.2)
ALBUMIN SERPL-MCNC: 3 G/DL (ref 3.5–5.2)
ALBUMIN SERPL-MCNC: 3.1 G/DL (ref 3.5–5.2)
ALBUMIN SERPL-MCNC: 3.2 G/DL (ref 3.5–5.2)
ALBUMIN SERPL-MCNC: 3.2 G/DL (ref 3.5–5.2)
ALBUMIN SERPL-MCNC: 3.3 G/DL (ref 3.5–5.2)
ALBUMIN SERPL-MCNC: 3.4 G/DL (ref 3.5–5.2)
ALBUMIN SERPL-MCNC: 3.5 G/DL (ref 3.5–5.2)
ALBUMIN SERPL-MCNC: 3.9 G/DL (ref 3.5–5)
ALBUMIN SERPL-MCNC: 4.2 G/DL (ref 3.5–5.2)
ALBUMIN SERPL-MCNC: 4.3 G/DL (ref 3.5–5.2)
ALBUMIN/GLOB SERPL: 0.7 G/DL
ALBUMIN/GLOB SERPL: 0.8 G/DL
ALBUMIN/GLOB SERPL: 0.8 G/DL
ALBUMIN/GLOB SERPL: 0.9 G/DL
ALBUMIN/GLOB SERPL: 1.1 {RATIO} (ref 1.4–2.6)
ALBUMIN/GLOB SERPL: 1.2 G/DL
ALBUMIN/GLOB SERPL: 1.3 G/DL
ALP SERPL-CCNC: 62 U/L (ref 39–117)
ALP SERPL-CCNC: 66 U/L (ref 39–117)
ALP SERPL-CCNC: 70 U/L (ref 39–117)
ALP SERPL-CCNC: 74 U/L (ref 39–117)
ALP SERPL-CCNC: 79 U/L (ref 43–160)
ALP SERPL-CCNC: 85 U/L (ref 39–117)
ALP SERPL-CCNC: 95 U/L (ref 39–117)
ALP SERPL-CCNC: 97 U/L (ref 39–117)
ALT SERPL W P-5'-P-CCNC: 10 U/L (ref 1–33)
ALT SERPL W P-5'-P-CCNC: 14 U/L (ref 1–33)
ALT SERPL W P-5'-P-CCNC: 14 U/L (ref 1–33)
ALT SERPL W P-5'-P-CCNC: 18 U/L (ref 1–33)
ALT SERPL W P-5'-P-CCNC: 8 U/L (ref 1–33)
ALT SERPL W P-5'-P-CCNC: 9 U/L (ref 1–33)
ALT SERPL W P-5'-P-CCNC: 9 U/L (ref 1–33)
ALT SERPL-CCNC: 14 U/L (ref 10–40)
AMORPH URATE CRY URNS QL MICRO: ABNORMAL /HPF
AMPHET UR QL CFM: NEGATIVE
AMPICILLIN SUSC ISLT: <=2
AMPICILLIN SUSC ISLT: >=32
AMPICILLIN+SULBAC SUSC ISLT: 8
ANION GAP SERPL CALC-SCNC: 15 MMOL/L (ref 8–19)
ANION GAP SERPL CALC-SCNC: 16 MMOL/L (ref 8–19)
ANION GAP SERPL CALCULATED.3IONS-SCNC: 10.1 MMOL/L (ref 5–15)
ANION GAP SERPL CALCULATED.3IONS-SCNC: 10.3 MMOL/L (ref 5–15)
ANION GAP SERPL CALCULATED.3IONS-SCNC: 10.5 MMOL/L (ref 5–15)
ANION GAP SERPL CALCULATED.3IONS-SCNC: 11 MMOL/L (ref 5–15)
ANION GAP SERPL CALCULATED.3IONS-SCNC: 11.1 MMOL/L (ref 5–15)
ANION GAP SERPL CALCULATED.3IONS-SCNC: 11.2 MMOL/L (ref 5–15)
ANION GAP SERPL CALCULATED.3IONS-SCNC: 11.6 MMOL/L (ref 5–15)
ANION GAP SERPL CALCULATED.3IONS-SCNC: 12.3 MMOL/L (ref 5–15)
ANION GAP SERPL CALCULATED.3IONS-SCNC: 13.6 MMOL/L (ref 5–15)
ANION GAP SERPL CALCULATED.3IONS-SCNC: 13.7 MMOL/L (ref 5–15)
ANION GAP SERPL CALCULATED.3IONS-SCNC: 14.4 MMOL/L (ref 5–15)
ANION GAP SERPL CALCULATED.3IONS-SCNC: 15.4 MMOL/L (ref 5–15)
ANION GAP SERPL CALCULATED.3IONS-SCNC: 15.9 MMOL/L (ref 5–15)
ANION GAP SERPL CALCULATED.3IONS-SCNC: 6.8 MMOL/L (ref 5–15)
ANION GAP SERPL CALCULATED.3IONS-SCNC: 7 MMOL/L (ref 5–15)
ANION GAP SERPL CALCULATED.3IONS-SCNC: 7.8 MMOL/L (ref 5–15)
ANION GAP SERPL CALCULATED.3IONS-SCNC: 8.1 MMOL/L (ref 5–15)
ANION GAP SERPL CALCULATED.3IONS-SCNC: 8.5 MMOL/L (ref 5–15)
ANION GAP SERPL CALCULATED.3IONS-SCNC: 8.6 MMOL/L (ref 5–15)
ANION GAP SERPL CALCULATED.3IONS-SCNC: 8.9 MMOL/L (ref 5–15)
ANION GAP SERPL CALCULATED.3IONS-SCNC: 9 MMOL/L (ref 5–15)
ANION GAP SERPL CALCULATED.3IONS-SCNC: 9.2 MMOL/L (ref 5–15)
ANION GAP SERPL CALCULATED.3IONS-SCNC: 9.3 MMOL/L (ref 5–15)
ANION GAP SERPL CALCULATED.3IONS-SCNC: 9.5 MMOL/L (ref 5–15)
ANION GAP SERPL CALCULATED.3IONS-SCNC: 9.6 MMOL/L (ref 5–15)
ANION GAP SERPL CALCULATED.3IONS-SCNC: 9.7 MMOL/L (ref 5–15)
APPEARANCE UR: ABNORMAL
AST SERPL-CCNC: 12 U/L (ref 1–32)
AST SERPL-CCNC: 14 U/L (ref 1–32)
AST SERPL-CCNC: 15 U/L (ref 1–32)
AST SERPL-CCNC: 15 U/L (ref 1–32)
AST SERPL-CCNC: 16 U/L (ref 1–32)
AST SERPL-CCNC: 19 U/L (ref 1–32)
AST SERPL-CCNC: 19 U/L (ref 1–32)
AST SERPL-CCNC: 22 U/L (ref 15–50)
BACTERIA SPEC AEROBE CULT: ABNORMAL
BACTERIA SPEC AEROBE CULT: ABNORMAL
BACTERIA SPEC AEROBE CULT: NORMAL
BACTERIA UR CULT: ABNORMAL
BACTERIA UR CULT: NORMAL
BACTERIA UR QL AUTO: ABNORMAL /HPF
BARBITURATES UR QL: NEGATIVE
BASOPHILS # BLD AUTO: 0.04 10*3/MM3 (ref 0–0.2)
BASOPHILS # BLD AUTO: 0.05 10*3/MM3 (ref 0–0.2)
BASOPHILS # BLD AUTO: 0.06 10*3/MM3 (ref 0–0.2)
BASOPHILS # BLD AUTO: 0.06 10*3/UL (ref 0–0.2)
BASOPHILS # BLD AUTO: 0.07 10*3/MM3 (ref 0–0.2)
BASOPHILS # BLD AUTO: 0.08 10*3/MM3 (ref 0–0.2)
BASOPHILS # BLD AUTO: 0.09 10*3/MM3 (ref 0–0.2)
BASOPHILS # BLD AUTO: 0.09 10*3/MM3 (ref 0–0.2)
BASOPHILS # BLD AUTO: 0.1 10*3/MM3 (ref 0–0.2)
BASOPHILS # BLD AUTO: 0.11 10*3/MM3 (ref 0–0.2)
BASOPHILS NFR BLD AUTO: 0.3 % (ref 0–1.5)
BASOPHILS NFR BLD AUTO: 0.4 % (ref 0–1.5)
BASOPHILS NFR BLD AUTO: 0.5 % (ref 0–1.5)
BASOPHILS NFR BLD AUTO: 0.6 % (ref 0–1.5)
BASOPHILS NFR BLD AUTO: 0.6 % (ref 0–3)
BASOPHILS NFR BLD AUTO: 0.7 % (ref 0–1.5)
BASOPHILS NFR BLD AUTO: 0.8 % (ref 0–1.5)
BENZODIAZ UR QL SCN: POSITIVE
BH CV ECHO MEAS - AO ROOT DIAM: 2.8 CM
BH CV ECHO MEAS - EDV(MOD-SP2): 48 ML
BH CV ECHO MEAS - EDV(MOD-SP4): 46 ML
BH CV ECHO MEAS - EF(MOD-BP): 66 %
BH CV ECHO MEAS - ESV(MOD-SP2): 16 ML
BH CV ECHO MEAS - ESV(MOD-SP4): 16 ML
BH CV ECHO MEAS - IVSD: 0.5 CM
BH CV ECHO MEAS - LA DIMENSION(2D): 2.6 CM
BH CV ECHO MEAS - LAT PEAK E' VEL: 8.9 CM/SEC
BH CV ECHO MEAS - LVIDD: 4.4 CM
BH CV ECHO MEAS - LVIDS: 3 CM
BH CV ECHO MEAS - LVOT DIAM: 2 CM
BH CV ECHO MEAS - LVPWD: 0.8 CM
BH CV ECHO MEAS - MED PEAK E' VEL: 5.77 CM/SEC
BH CV ECHO MEAS - MV A MAX VEL: 113 CM/SEC
BH CV ECHO MEAS - MV DEC TIME: 169 MSEC
BH CV ECHO MEAS - MV E MAX VEL: 70 CM/SEC
BH CV ECHO MEAS - MV E/A: 0.6
BH CV ECHO MEAS - RVDD: 2.5 CM
BH CV ECHO MEASUREMENTS AVERAGE E/E' RATIO: 9.54
BILIRUB CONJ SERPL-MCNC: 0.3 MG/DL (ref 0–0.3)
BILIRUB INDIRECT SERPL-MCNC: 0.5 MG/DL
BILIRUB SERPL-MCNC: 0.3 MG/DL (ref 0–1.2)
BILIRUB SERPL-MCNC: 0.4 MG/DL (ref 0–1.2)
BILIRUB SERPL-MCNC: 0.4 MG/DL (ref 0–1.2)
BILIRUB SERPL-MCNC: 0.49 MG/DL (ref 0.2–1.3)
BILIRUB SERPL-MCNC: 0.5 MG/DL (ref 0–1.2)
BILIRUB SERPL-MCNC: 0.8 MG/DL (ref 0–1.2)
BILIRUB UR QL STRIP: ABNORMAL
BILIRUB UR QL STRIP: NEGATIVE
BILIRUB UR QL STRIP: NORMAL
BILIRUB UR QL STRIP: NORMAL
BILIRUB UR QL: ABNORMAL
BILIRUB UR QL: NEGATIVE
BILIRUB UR QL: NEGATIVE
BUN SERPL-MCNC: 12 MG/DL (ref 8–23)
BUN SERPL-MCNC: 12 MG/DL (ref 8–23)
BUN SERPL-MCNC: 13 MG/DL (ref 8–23)
BUN SERPL-MCNC: 13 MG/DL (ref 8–23)
BUN SERPL-MCNC: 14 MG/DL (ref 5–25)
BUN SERPL-MCNC: 15 MG/DL (ref 8–23)
BUN SERPL-MCNC: 17 MG/DL (ref 8–23)
BUN SERPL-MCNC: 17 MG/DL (ref 8–23)
BUN SERPL-MCNC: 18 MG/DL (ref 8–23)
BUN SERPL-MCNC: 18 MG/DL (ref 8–23)
BUN SERPL-MCNC: 19 MG/DL (ref 8–23)
BUN SERPL-MCNC: 19 MG/DL (ref 8–23)
BUN SERPL-MCNC: 20 MG/DL (ref 8–23)
BUN SERPL-MCNC: 20 MG/DL (ref 8–23)
BUN SERPL-MCNC: 21 MG/DL (ref 8–23)
BUN SERPL-MCNC: 22 MG/DL (ref 8–23)
BUN SERPL-MCNC: 25 MG/DL (ref 8–23)
BUN SERPL-MCNC: 26 MG/DL (ref 8–23)
BUN SERPL-MCNC: 28 MG/DL (ref 8–23)
BUN SERPL-MCNC: 29 MG/DL (ref 5–25)
BUN SERPL-MCNC: 29 MG/DL (ref 8–23)
BUN SERPL-MCNC: 32 MG/DL (ref 8–23)
BUN SERPL-MCNC: 33 MG/DL (ref 8–23)
BUN SERPL-MCNC: 41 MG/DL (ref 8–23)
BUN SERPL-MCNC: 7 MG/DL (ref 8–23)
BUN SERPL-MCNC: 8 MG/DL (ref 8–23)
BUN SERPL-MCNC: 9 MG/DL (ref 8–23)
BUN/CREAT SERPL: 11.9 (ref 7–25)
BUN/CREAT SERPL: 15.1 (ref 7–25)
BUN/CREAT SERPL: 15.5 (ref 7–25)
BUN/CREAT SERPL: 15.7 (ref 7–25)
BUN/CREAT SERPL: 16.3 (ref 7–25)
BUN/CREAT SERPL: 17.6 (ref 7–25)
BUN/CREAT SERPL: 17.8 (ref 7–25)
BUN/CREAT SERPL: 18.2 (ref 7–25)
BUN/CREAT SERPL: 19 {RATIO} (ref 6–20)
BUN/CREAT SERPL: 19.7 (ref 7–25)
BUN/CREAT SERPL: 21 (ref 7–25)
BUN/CREAT SERPL: 21.1 (ref 7–25)
BUN/CREAT SERPL: 21.5 (ref 7–25)
BUN/CREAT SERPL: 21.7 (ref 7–25)
BUN/CREAT SERPL: 22.2 (ref 7–25)
BUN/CREAT SERPL: 25.6 (ref 7–25)
BUN/CREAT SERPL: 25.6 (ref 7–25)
BUN/CREAT SERPL: 28.8 (ref 7–25)
BUN/CREAT SERPL: 30.8 (ref 7–25)
BUN/CREAT SERPL: 32 {RATIO} (ref 6–20)
BUN/CREAT SERPL: 32.1 (ref 7–25)
BUN/CREAT SERPL: 32.1 (ref 7–25)
BUN/CREAT SERPL: 32.8 (ref 7–25)
BUN/CREAT SERPL: 33.3 (ref 7–25)
BUN/CREAT SERPL: 34.1 (ref 7–25)
BUN/CREAT SERPL: 35 (ref 7–25)
BUN/CREAT SERPL: 36.2 (ref 7–25)
BUN/CREAT SERPL: 37.1 (ref 7–25)
BUN/CREAT SERPL: 39.4 (ref 7–25)
BUN/CREAT SERPL: 42 (ref 7–25)
BUN/CREAT SERPL: 43.1 (ref 7–25)
BUN/CREAT SERPL: 45.7 (ref 7–25)
BUN/CREAT SERPL: 48.2 (ref 7–25)
CALCIUM SERPL-MCNC: 9.6 MG/DL (ref 8.7–10.4)
CALCIUM SERPL-MCNC: 9.8 MG/DL (ref 8.7–10.4)
CALCIUM SPEC-SCNC: 10 MG/DL (ref 8.6–10.5)
CALCIUM SPEC-SCNC: 10.1 MG/DL (ref 8.6–10.5)
CALCIUM SPEC-SCNC: 10.1 MG/DL (ref 8.6–10.5)
CALCIUM SPEC-SCNC: 10.2 MG/DL (ref 8.6–10.5)
CALCIUM SPEC-SCNC: 10.3 MG/DL (ref 8.6–10.5)
CALCIUM SPEC-SCNC: 10.3 MG/DL (ref 8.6–10.5)
CALCIUM SPEC-SCNC: 10.4 MG/DL (ref 8.6–10.5)
CALCIUM SPEC-SCNC: 10.4 MG/DL (ref 8.6–10.5)
CALCIUM SPEC-SCNC: 8.2 MG/DL (ref 8.6–10.5)
CALCIUM SPEC-SCNC: 8.3 MG/DL (ref 8.6–10.5)
CALCIUM SPEC-SCNC: 9 MG/DL (ref 8.6–10.5)
CALCIUM SPEC-SCNC: 9.1 MG/DL (ref 8.6–10.5)
CALCIUM SPEC-SCNC: 9.2 MG/DL (ref 8.6–10.5)
CALCIUM SPEC-SCNC: 9.2 MG/DL (ref 8.6–10.5)
CALCIUM SPEC-SCNC: 9.3 MG/DL (ref 8.6–10.5)
CALCIUM SPEC-SCNC: 9.4 MG/DL (ref 8.6–10.5)
CALCIUM SPEC-SCNC: 9.5 MG/DL (ref 8.6–10.5)
CALCIUM SPEC-SCNC: 9.7 MG/DL (ref 8.6–10.5)
CALCIUM SPEC-SCNC: 9.7 MG/DL (ref 8.6–10.5)
CALCIUM SPEC-SCNC: 9.8 MG/DL (ref 8.6–10.5)
CALCIUM SPEC-SCNC: 9.8 MG/DL (ref 8.6–10.5)
CEFAZOLIN SUSC ISLT: <=4
CEFEPIME SUSC ISLT: <=0.12
CEFTAZIDIME SUSC ISLT: <=1
CEFTRIAXONE SUSC ISLT: <=0.25
CHLORIDE SERPL-SCNC: 101 MMOL/L (ref 98–107)
CHLORIDE SERPL-SCNC: 101 MMOL/L (ref 98–107)
CHLORIDE SERPL-SCNC: 102 MMOL/L (ref 98–107)
CHLORIDE SERPL-SCNC: 102 MMOL/L (ref 99–111)
CHLORIDE SERPL-SCNC: 102 MMOL/L (ref 99–111)
CHLORIDE SERPL-SCNC: 103 MMOL/L (ref 98–107)
CHLORIDE SERPL-SCNC: 104 MMOL/L (ref 98–107)
CHLORIDE SERPL-SCNC: 105 MMOL/L (ref 98–107)
CHLORIDE SERPL-SCNC: 105 MMOL/L (ref 98–107)
CHLORIDE SERPL-SCNC: 106 MMOL/L (ref 98–107)
CHLORIDE SERPL-SCNC: 106 MMOL/L (ref 98–107)
CHLORIDE SERPL-SCNC: 107 MMOL/L (ref 98–107)
CHLORIDE SERPL-SCNC: 107 MMOL/L (ref 98–107)
CHLORIDE SERPL-SCNC: 108 MMOL/L (ref 98–107)
CHLORIDE SERPL-SCNC: 110 MMOL/L (ref 98–107)
CHLORIDE SERPL-SCNC: 93 MMOL/L (ref 98–107)
CHLORIDE SERPL-SCNC: 94 MMOL/L (ref 98–107)
CHLORIDE SERPL-SCNC: 96 MMOL/L (ref 98–107)
CHLORIDE SERPL-SCNC: 96 MMOL/L (ref 98–107)
CHLORIDE SERPL-SCNC: 97 MMOL/L (ref 98–107)
CHLORIDE SERPL-SCNC: 98 MMOL/L (ref 98–107)
CHLORIDE SERPL-SCNC: 99 MMOL/L (ref 98–107)
CHOLEST SERPL-MCNC: 323 MG/DL (ref 0–200)
CIPROFLOXACIN SUSC ISLT: <=0.25
CIPROFLOXACIN SUSC ISLT: <=0.5
CLARITY UR: ABNORMAL
CLARITY UR: CLEAR
CO2 SERPL-SCNC: 17.4 MMOL/L (ref 22–29)
CO2 SERPL-SCNC: 18 MMOL/L (ref 22–29)
CO2 SERPL-SCNC: 19.6 MMOL/L (ref 22–29)
CO2 SERPL-SCNC: 20 MMOL/L (ref 22–29)
CO2 SERPL-SCNC: 21.1 MMOL/L (ref 22–29)
CO2 SERPL-SCNC: 21.4 MMOL/L (ref 22–29)
CO2 SERPL-SCNC: 21.7 MMOL/L (ref 22–29)
CO2 SERPL-SCNC: 22.3 MMOL/L (ref 22–29)
CO2 SERPL-SCNC: 22.5 MMOL/L (ref 22–29)
CO2 SERPL-SCNC: 22.8 MMOL/L (ref 22–29)
CO2 SERPL-SCNC: 23.2 MMOL/L (ref 22–29)
CO2 SERPL-SCNC: 23.8 MMOL/L (ref 22–29)
CO2 SERPL-SCNC: 23.9 MMOL/L (ref 22–29)
CO2 SERPL-SCNC: 24.3 MMOL/L (ref 22–29)
CO2 SERPL-SCNC: 24.4 MMOL/L (ref 22–29)
CO2 SERPL-SCNC: 24.4 MMOL/L (ref 22–29)
CO2 SERPL-SCNC: 24.5 MMOL/L (ref 22–29)
CO2 SERPL-SCNC: 24.8 MMOL/L (ref 22–29)
CO2 SERPL-SCNC: 25.1 MMOL/L (ref 22–29)
CO2 SERPL-SCNC: 25.1 MMOL/L (ref 22–29)
CO2 SERPL-SCNC: 25.5 MMOL/L (ref 22–29)
CO2 SERPL-SCNC: 25.9 MMOL/L (ref 22–29)
CO2 SERPL-SCNC: 27 MMOL/L (ref 22–29)
CO2 SERPL-SCNC: 27.1 MMOL/L (ref 22–29)
CO2 SERPL-SCNC: 27.6 MMOL/L (ref 22–29)
CO2 SERPL-SCNC: 27.7 MMOL/L (ref 22–29)
CO2 SERPL-SCNC: 28.2 MMOL/L (ref 22–29)
CO2 SERPL-SCNC: 28.7 MMOL/L (ref 22–29)
CO2 SERPL-SCNC: 28.8 MMOL/L (ref 22–29)
CO2 SERPL-SCNC: 29.9 MMOL/L (ref 22–29)
CO2 SERPL-SCNC: 30.8 MMOL/L (ref 22–29)
COD CRY URNS QL: ABNORMAL /HPF
COLOR UR: ABNORMAL
COLOR UR: NORMAL
COLOR UR: YELLOW
CONV ABS IMM GRAN: 0.03 10*3/UL (ref 0–0.2)
CONV AMP/METHAMP UR: NEGATIVE
CONV BACTERIA: ABNORMAL
CONV CLARITY OF URINE: CLEAR
CONV CLARITY OF URINE: CLEAR
CONV CLARITY OF URINE: NORMAL
CONV CO2: 27 MMOL/L (ref 22–32)
CONV CO2: 27 MMOL/L (ref 22–32)
CONV COCAINE, UR: NEGATIVE
CONV COLLECTION SOURCE (UA): ABNORMAL
CONV CRYSTALS: ABNORMAL /[HPF]
CONV GENTAMICIN HIGH LEVEL SYNERGY: ABNORMAL
CONV HYALINE CASTS IN URINE MICRO: ABNORMAL /[LPF]
CONV IMMATURE GRAN: 0.3 % (ref 0–1.8)
CONV PROTEIN IN URINE BY AUTOMATED TEST STRIP: NEGATIVE
CONV PROTEIN IN URINE BY AUTOMATED TEST STRIP: NEGATIVE
CONV PROTEIN IN URINE BY AUTOMATED TEST STRIP: NORMAL
CONV STREPTOMYCIN HIGH LEVEL SYNERGY: ABNORMAL
CONV TOTAL PROTEIN: 7.5 G/DL (ref 6.3–8.2)
CONV UROBILINOGEN IN URINE BY AUTOMATED TEST STRIP: 0.2 {EHRLICHU}/DL (ref 0.1–1)
CONV UROBILINOGEN IN URINE BY AUTOMATED TEST STRIP: 0.2 {EHRLICHU}/DL (ref 0.1–1)
CONV UROBILINOGEN IN URINE BY AUTOMATED TEST STRIP: 1 {EHRLICHU}/DL (ref 0.1–1)
CONV UROBILINOGEN IN URINE BY AUTOMATED TEST STRIP: NORMAL
CONV YEAST, UA: PRESENT
CREAT BLDA-MCNC: 0.9 MG/DL
CREAT SERPL-MCNC: 0.45 MG/DL (ref 0.57–1)
CREAT SERPL-MCNC: 0.49 MG/DL (ref 0.57–1)
CREAT SERPL-MCNC: 0.5 MG/DL (ref 0.57–1)
CREAT SERPL-MCNC: 0.51 MG/DL (ref 0.57–1)
CREAT SERPL-MCNC: 0.52 MG/DL (ref 0.57–1)
CREAT SERPL-MCNC: 0.53 MG/DL (ref 0.57–1)
CREAT SERPL-MCNC: 0.53 MG/DL (ref 0.57–1)
CREAT SERPL-MCNC: 0.54 MG/DL (ref 0.57–1)
CREAT SERPL-MCNC: 0.56 MG/DL (ref 0.57–1)
CREAT SERPL-MCNC: 0.57 MG/DL (ref 0.57–1)
CREAT SERPL-MCNC: 0.58 MG/DL (ref 0.57–1)
CREAT SERPL-MCNC: 0.59 MG/DL (ref 0.57–1)
CREAT SERPL-MCNC: 0.6 MG/DL (ref 0.57–1)
CREAT SERPL-MCNC: 0.6 MG/DL (ref 0.57–1)
CREAT SERPL-MCNC: 0.61 MG/DL (ref 0.57–1)
CREAT SERPL-MCNC: 0.61 MG/DL (ref 0.57–1)
CREAT SERPL-MCNC: 0.62 MG/DL (ref 0.57–1)
CREAT SERPL-MCNC: 0.65 MG/DL (ref 0.57–1)
CREAT SERPL-MCNC: 0.65 MG/DL (ref 0.57–1)
CREAT SERPL-MCNC: 0.66 MG/DL (ref 0.57–1)
CREAT SERPL-MCNC: 0.69 MG/DL (ref 0.57–1)
CREAT SERPL-MCNC: 0.7 MG/DL (ref 0.57–1)
CREAT SERPL-MCNC: 0.79 MG/DL (ref 0.57–1)
CREAT SERPL-MCNC: 0.82 MG/DL (ref 0.57–1)
CREAT SERPL-MCNC: 0.85 MG/DL (ref 0.57–1)
CREAT SERPL-MCNC: 0.85 MG/DL (ref 0.57–1)
CREAT SERPL-MCNC: 0.86 MG/DL (ref 0.57–1)
CREAT SERPL-MCNC: 0.89 MG/DL (ref 0.57–1)
CREAT SERPL-MCNC: 0.9 MG/DL (ref 0.57–1)
CREAT SERPL-MCNC: 0.99 MG/DL (ref 0.57–1)
CREAT SERPL-MCNC: 1.19 MG/DL (ref 0.57–1)
CREAT UR-MCNC: 0.73 MG/DL (ref 0.5–0.9)
CREAT UR-MCNC: 0.9 MG/DL (ref 0.5–0.9)
D-LACTATE SERPL-SCNC: 0.6 MMOL/L (ref 0.5–2)
D-LACTATE SERPL-SCNC: 0.8 MMOL/L (ref 0.5–2)
D-LACTATE SERPL-SCNC: 0.9 MMOL/L (ref 0.5–2)
D-LACTATE SERPL-SCNC: 1.6 MMOL/L (ref 0.5–2)
D-LACTATE SERPL-SCNC: 2.2 MMOL/L (ref 0.5–2)
D-LACTATE SERPL-SCNC: 2.2 MMOL/L (ref 0.5–2)
D-LACTATE SERPL-SCNC: 2.6 MMOL/L (ref 0.5–2)
DAPTOMYCIN SUSC ISLT: 1
DEPRECATED RDW RBC AUTO: 46.6 FL (ref 37–54)
DEPRECATED RDW RBC AUTO: 46.8 FL (ref 37–54)
DEPRECATED RDW RBC AUTO: 47 FL (ref 37–54)
DEPRECATED RDW RBC AUTO: 47.1 FL (ref 37–54)
DEPRECATED RDW RBC AUTO: 47.2 FL (ref 37–54)
DEPRECATED RDW RBC AUTO: 47.4 FL (ref 37–54)
DEPRECATED RDW RBC AUTO: 47.8 FL (ref 37–54)
DEPRECATED RDW RBC AUTO: 47.8 FL (ref 37–54)
DEPRECATED RDW RBC AUTO: 48.2 FL (ref 37–54)
DEPRECATED RDW RBC AUTO: 48.3 FL (ref 37–54)
DEPRECATED RDW RBC AUTO: 48.4 FL (ref 37–54)
DEPRECATED RDW RBC AUTO: 48.7 FL (ref 37–54)
DEPRECATED RDW RBC AUTO: 48.7 FL (ref 37–54)
DEPRECATED RDW RBC AUTO: 48.8 FL (ref 37–54)
DEPRECATED RDW RBC AUTO: 49.2 FL (ref 37–54)
DEPRECATED RDW RBC AUTO: 50.2 FL (ref 37–54)
DEPRECATED RDW RBC AUTO: 50.6 FL (ref 37–54)
DEPRECATED RDW RBC AUTO: 50.6 FL (ref 37–54)
DEPRECATED RDW RBC AUTO: 51 FL (ref 36.4–46.3)
DEPRECATED RDW RBC AUTO: 51.1 FL (ref 37–54)
DEPRECATED RDW RBC AUTO: 51.4 FL (ref 37–54)
DEPRECATED RDW RBC AUTO: 51.6 FL (ref 37–54)
DEPRECATED RDW RBC AUTO: 51.7 FL (ref 37–54)
DEPRECATED RDW RBC AUTO: 52 FL (ref 37–54)
DEPRECATED RDW RBC AUTO: 52.3 FL (ref 37–54)
DEPRECATED RDW RBC AUTO: 52.3 FL (ref 37–54)
DEPRECATED RDW RBC AUTO: 52.4 FL (ref 37–54)
DEPRECATED RDW RBC AUTO: 54.4 FL (ref 37–54)
DEPRECATED RDW RBC AUTO: 55.1 FL (ref 37–54)
DEPRECATED RDW RBC AUTO: 55.5 FL (ref 37–54)
DOXYCYCLINE SUSC ISLT: 8
DOXYCYCLINE SUSC ISLT: >=16
EOSINOPHIL # BLD AUTO: 0 10*3/MM3 (ref 0–0.4)
EOSINOPHIL # BLD AUTO: 0.01 10*3/MM3 (ref 0–0.4)
EOSINOPHIL # BLD AUTO: 0.04 10*3/MM3 (ref 0–0.4)
EOSINOPHIL # BLD AUTO: 0.08 10*3/MM3 (ref 0–0.4)
EOSINOPHIL # BLD AUTO: 0.11 10*3/MM3 (ref 0–0.4)
EOSINOPHIL # BLD AUTO: 0.12 10*3/MM3 (ref 0–0.4)
EOSINOPHIL # BLD AUTO: 0.18 10*3/MM3 (ref 0–0.4)
EOSINOPHIL # BLD AUTO: 0.21 10*3/MM3 (ref 0–0.4)
EOSINOPHIL # BLD AUTO: 0.24 10*3/UL (ref 0–0.7)
EOSINOPHIL # BLD AUTO: 0.29 10*3/MM3 (ref 0–0.4)
EOSINOPHIL # BLD AUTO: 0.33 10*3/MM3 (ref 0–0.4)
EOSINOPHIL # BLD AUTO: 0.34 10*3/MM3 (ref 0–0.4)
EOSINOPHIL # BLD AUTO: 0.35 10*3/MM3 (ref 0–0.4)
EOSINOPHIL # BLD AUTO: 0.36 10*3/MM3 (ref 0–0.4)
EOSINOPHIL # BLD AUTO: 0.36 10*3/MM3 (ref 0–0.4)
EOSINOPHIL # BLD AUTO: 0.38 10*3/MM3 (ref 0–0.4)
EOSINOPHIL # BLD AUTO: 0.38 10*3/MM3 (ref 0–0.4)
EOSINOPHIL # BLD AUTO: 0.39 10*3/MM3 (ref 0–0.4)
EOSINOPHIL # BLD AUTO: 0.39 10*3/MM3 (ref 0–0.4)
EOSINOPHIL # BLD AUTO: 0.41 10*3/MM3 (ref 0–0.4)
EOSINOPHIL # BLD AUTO: 0.42 10*3/MM3 (ref 0–0.4)
EOSINOPHIL # BLD AUTO: 0.43 10*3/MM3 (ref 0–0.4)
EOSINOPHIL # BLD AUTO: 0.44 10*3/MM3 (ref 0–0.4)
EOSINOPHIL # BLD AUTO: 0.45 10*3/MM3 (ref 0–0.4)
EOSINOPHIL # BLD AUTO: 0.5 10*3/MM3 (ref 0–0.4)
EOSINOPHIL # BLD AUTO: 0.5 10*3/MM3 (ref 0–0.4)
EOSINOPHIL # BLD AUTO: 0.6 10*3/MM3 (ref 0–0.4)
EOSINOPHIL # BLD AUTO: 2.3 % (ref 0–7)
EOSINOPHIL NFR BLD AUTO: 0 % (ref 0.3–6.2)
EOSINOPHIL NFR BLD AUTO: 0.1 % (ref 0.3–6.2)
EOSINOPHIL NFR BLD AUTO: 0.3 % (ref 0.3–6.2)
EOSINOPHIL NFR BLD AUTO: 0.7 % (ref 0.3–6.2)
EOSINOPHIL NFR BLD AUTO: 0.8 % (ref 0.3–6.2)
EOSINOPHIL NFR BLD AUTO: 0.9 % (ref 0.3–6.2)
EOSINOPHIL NFR BLD AUTO: 1.9 % (ref 0.3–6.2)
EOSINOPHIL NFR BLD AUTO: 2 % (ref 0.3–6.2)
EOSINOPHIL NFR BLD AUTO: 2 % (ref 0.3–6.2)
EOSINOPHIL NFR BLD AUTO: 2.5 % (ref 0.3–6.2)
EOSINOPHIL NFR BLD AUTO: 2.7 % (ref 0.3–6.2)
EOSINOPHIL NFR BLD AUTO: 2.8 % (ref 0.3–6.2)
EOSINOPHIL NFR BLD AUTO: 2.9 % (ref 0.3–6.2)
EOSINOPHIL NFR BLD AUTO: 3 % (ref 0.3–6.2)
EOSINOPHIL NFR BLD AUTO: 3.2 % (ref 0.3–6.2)
EOSINOPHIL NFR BLD AUTO: 3.3 % (ref 0.3–6.2)
EOSINOPHIL NFR BLD AUTO: 3.4 % (ref 0.3–6.2)
EOSINOPHIL NFR BLD AUTO: 3.5 % (ref 0.3–6.2)
EOSINOPHIL NFR BLD AUTO: 3.6 % (ref 0.3–6.2)
EOSINOPHIL NFR BLD AUTO: 3.8 % (ref 0.3–6.2)
EOSINOPHIL NFR BLD AUTO: 4.2 % (ref 0.3–6.2)
EOSINOPHIL NFR BLD AUTO: 4.6 % (ref 0.3–6.2)
EOSINOPHIL NFR BLD AUTO: 4.7 % (ref 0.3–6.2)
EOSINOPHIL NFR BLD AUTO: 5.3 % (ref 0.3–6.2)
EOSINOPHIL NFR BLD AUTO: 5.7 % (ref 0.3–6.2)
EOSINOPHIL NFR BLD AUTO: 5.7 % (ref 0.3–6.2)
ERTAPENEM SUSC ISLT: <=0.12
ERYTHROCYTE [DISTWIDTH] IN BLOOD BY AUTOMATED COUNT: 13 % (ref 12.3–15.4)
ERYTHROCYTE [DISTWIDTH] IN BLOOD BY AUTOMATED COUNT: 13.2 % (ref 12.3–15.4)
ERYTHROCYTE [DISTWIDTH] IN BLOOD BY AUTOMATED COUNT: 13.3 % (ref 12.3–15.4)
ERYTHROCYTE [DISTWIDTH] IN BLOOD BY AUTOMATED COUNT: 13.4 % (ref 12.3–15.4)
ERYTHROCYTE [DISTWIDTH] IN BLOOD BY AUTOMATED COUNT: 13.4 % (ref 12.3–15.4)
ERYTHROCYTE [DISTWIDTH] IN BLOOD BY AUTOMATED COUNT: 13.7 % (ref 12.3–15.4)
ERYTHROCYTE [DISTWIDTH] IN BLOOD BY AUTOMATED COUNT: 13.9 % (ref 11.7–14.4)
ERYTHROCYTE [DISTWIDTH] IN BLOOD BY AUTOMATED COUNT: 13.9 % (ref 12.3–15.4)
ERYTHROCYTE [DISTWIDTH] IN BLOOD BY AUTOMATED COUNT: 14 % (ref 12.3–15.4)
ERYTHROCYTE [DISTWIDTH] IN BLOOD BY AUTOMATED COUNT: 14.1 % (ref 12.3–15.4)
ERYTHROCYTE [DISTWIDTH] IN BLOOD BY AUTOMATED COUNT: 14.1 % (ref 12.3–15.4)
ERYTHROCYTE [DISTWIDTH] IN BLOOD BY AUTOMATED COUNT: 14.2 % (ref 12.3–15.4)
ERYTHROCYTE [DISTWIDTH] IN BLOOD BY AUTOMATED COUNT: 14.2 % (ref 12.3–15.4)
ERYTHROCYTE [DISTWIDTH] IN BLOOD BY AUTOMATED COUNT: 14.3 % (ref 12.3–15.4)
ERYTHROCYTE [DISTWIDTH] IN BLOOD BY AUTOMATED COUNT: 14.3 % (ref 12.3–15.4)
ERYTHROCYTE [DISTWIDTH] IN BLOOD BY AUTOMATED COUNT: 14.4 % (ref 12.3–15.4)
ERYTHROCYTE [DISTWIDTH] IN BLOOD BY AUTOMATED COUNT: 14.5 % (ref 12.3–15.4)
ERYTHROCYTE [DISTWIDTH] IN BLOOD BY AUTOMATED COUNT: 14.6 % (ref 12.3–15.4)
ERYTHROCYTE [DISTWIDTH] IN BLOOD BY AUTOMATED COUNT: 14.6 % (ref 12.3–15.4)
ERYTHROCYTE [DISTWIDTH] IN BLOOD BY AUTOMATED COUNT: 15.3 % (ref 12.3–15.4)
ERYTHROCYTE [DISTWIDTH] IN BLOOD BY AUTOMATED COUNT: 15.5 % (ref 12.3–15.4)
ERYTHROCYTE [DISTWIDTH] IN BLOOD BY AUTOMATED COUNT: 15.5 % (ref 12.3–15.4)
ERYTHROCYTE [DISTWIDTH] IN BLOOD BY AUTOMATED COUNT: 15.7 % (ref 12.3–15.4)
ERYTHROMYCIN SUSC ISLT: 2
ERYTHROMYCIN SUSC ISLT: 2
ERYTHROMYCIN SUSC ISLT: 4
ERYTHROMYCIN SUSC ISLT: 4
FIBRINOGEN 570: 548 MG/DL (ref 200–450)
FLUAV AG NPH QL: NEGATIVE
FLUBV AG NPH QL IA: NEGATIVE
FOLATE SERPL-MCNC: 10.6 NG/ML (ref 4.78–24.2)
FOLATE SERPL-MCNC: 12.1 NG/ML (ref 4.8–20)
FOLATE SERPL-MCNC: 9.62 NG/ML (ref 4.78–24.2)
GENTAMICIN SUSC ISLT: <=1
GFR SERPL CREATININE-BSD FRML MDRD: 100 ML/MIN/1.73
GFR SERPL CREATININE-BSD FRML MDRD: 102 ML/MIN/1.73
GFR SERPL CREATININE-BSD FRML MDRD: 104 ML/MIN/1.73
GFR SERPL CREATININE-BSD FRML MDRD: 108 ML/MIN/1.73
GFR SERPL CREATININE-BSD FRML MDRD: 111 ML/MIN/1.73
GFR SERPL CREATININE-BSD FRML MDRD: 111 ML/MIN/1.73
GFR SERPL CREATININE-BSD FRML MDRD: 113 ML/MIN/1.73
GFR SERPL CREATININE-BSD FRML MDRD: 116 ML/MIN/1.73
GFR SERPL CREATININE-BSD FRML MDRD: 118 ML/MIN/1.73
GFR SERPL CREATININE-BSD FRML MDRD: 121 ML/MIN/1.73
GFR SERPL CREATININE-BSD FRML MDRD: 134 ML/MIN/1.73
GFR SERPL CREATININE-BSD FRML MDRD: 44 ML/MIN/1.73
GFR SERPL CREATININE-BSD FRML MDRD: 54 ML/MIN/1.73
GFR SERPL CREATININE-BSD FRML MDRD: 60 ML/MIN/1.73
GFR SERPL CREATININE-BSD FRML MDRD: 61 ML/MIN/1.73
GFR SERPL CREATININE-BSD FRML MDRD: 63 ML/MIN/1.73
GFR SERPL CREATININE-BSD FRML MDRD: 64 ML/MIN/1.73
GFR SERPL CREATININE-BSD FRML MDRD: 64 ML/MIN/1.73
GFR SERPL CREATININE-BSD FRML MDRD: 67 ML/MIN/1.73
GFR SERPL CREATININE-BSD FRML MDRD: 70 ML/MIN/1.73
GFR SERPL CREATININE-BSD FRML MDRD: 80 ML/MIN/1.73
GFR SERPL CREATININE-BSD FRML MDRD: 82 ML/MIN/1.73
GFR SERPL CREATININE-BSD FRML MDRD: 86 ML/MIN/1.73
GFR SERPL CREATININE-BSD FRML MDRD: 87 ML/MIN/1.73
GFR SERPL CREATININE-BSD FRML MDRD: 87 ML/MIN/1.73
GFR SERPL CREATININE-BSD FRML MDRD: 92 ML/MIN/1.73
GFR SERPL CREATININE-BSD FRML MDRD: 94 ML/MIN/1.73
GFR SERPL CREATININE-BSD FRML MDRD: 94 ML/MIN/1.73
GFR SERPL CREATININE-BSD FRML MDRD: 96 ML/MIN/1.73
GFR SERPL CREATININE-BSD FRML MDRD: 96 ML/MIN/1.73
GFR SERPL CREATININE-BSD FRML MDRD: 98 ML/MIN/1.73
GFR SERPLBLD BASED ON 1.73 SQ M-ARVRAT: 60 ML/MIN/{1.73_M2}
GFR SERPLBLD BASED ON 1.73 SQ M-ARVRAT: >60 ML/MIN/{1.73_M2}
GLOBULIN UR ELPH-MCNC: 3.3 GM/DL
GLOBULIN UR ELPH-MCNC: 3.3 GM/DL
GLOBULIN UR ELPH-MCNC: 3.4 GM/DL
GLOBULIN UR ELPH-MCNC: 3.6 G/DL (ref 2–3.5)
GLOBULIN UR ELPH-MCNC: 3.7 GM/DL
GLOBULIN UR ELPH-MCNC: 4.3 GM/DL
GLOBULIN UR ELPH-MCNC: 5 GM/DL
GLUCOSE BLDC GLUCOMTR-MCNC: 105 MG/DL (ref 70–130)
GLUCOSE BLDC GLUCOMTR-MCNC: 111 MG/DL (ref 70–130)
GLUCOSE BLDC GLUCOMTR-MCNC: 114 MG/DL (ref 70–130)
GLUCOSE BLDC GLUCOMTR-MCNC: 119 MG/DL (ref 70–99)
GLUCOSE BLDC GLUCOMTR-MCNC: 128 MG/DL (ref 70–130)
GLUCOSE BLDC GLUCOMTR-MCNC: 144 MG/DL (ref 70–130)
GLUCOSE BLDC GLUCOMTR-MCNC: 158 MG/DL (ref 70–130)
GLUCOSE SERPL-MCNC: 100 MG/DL (ref 65–99)
GLUCOSE SERPL-MCNC: 104 MG/DL (ref 65–99)
GLUCOSE SERPL-MCNC: 106 MG/DL (ref 65–99)
GLUCOSE SERPL-MCNC: 111 MG/DL (ref 65–99)
GLUCOSE SERPL-MCNC: 113 MG/DL (ref 65–99)
GLUCOSE SERPL-MCNC: 114 MG/DL (ref 65–99)
GLUCOSE SERPL-MCNC: 117 MG/DL (ref 65–99)
GLUCOSE SERPL-MCNC: 117 MG/DL (ref 65–99)
GLUCOSE SERPL-MCNC: 118 MG/DL (ref 65–99)
GLUCOSE SERPL-MCNC: 118 MG/DL (ref 65–99)
GLUCOSE SERPL-MCNC: 120 MG/DL (ref 65–99)
GLUCOSE SERPL-MCNC: 120 MG/DL (ref 65–99)
GLUCOSE SERPL-MCNC: 122 MG/DL (ref 65–99)
GLUCOSE SERPL-MCNC: 123 MG/DL (ref 65–99)
GLUCOSE SERPL-MCNC: 124 MG/DL (ref 65–99)
GLUCOSE SERPL-MCNC: 124 MG/DL (ref 65–99)
GLUCOSE SERPL-MCNC: 125 MG/DL (ref 65–99)
GLUCOSE SERPL-MCNC: 127 MG/DL (ref 65–99)
GLUCOSE SERPL-MCNC: 129 MG/DL (ref 65–99)
GLUCOSE SERPL-MCNC: 131 MG/DL (ref 65–99)
GLUCOSE SERPL-MCNC: 133 MG/DL (ref 65–99)
GLUCOSE SERPL-MCNC: 135 MG/DL (ref 65–99)
GLUCOSE SERPL-MCNC: 135 MG/DL (ref 65–99)
GLUCOSE SERPL-MCNC: 137 MG/DL (ref 65–99)
GLUCOSE SERPL-MCNC: 138 MG/DL (ref 65–99)
GLUCOSE SERPL-MCNC: 147 MG/DL (ref 65–99)
GLUCOSE SERPL-MCNC: 153 MG/DL (ref 65–99)
GLUCOSE SERPL-MCNC: 155 MG/DL (ref 65–99)
GLUCOSE SERPL-MCNC: 157 MG/DL (ref 65–99)
GLUCOSE SERPL-MCNC: 162 MG/DL (ref 65–99)
GLUCOSE SERPL-MCNC: 222 MG/DL (ref 65–99)
GLUCOSE SERPL-MCNC: 234 MG/DL (ref 65–99)
GLUCOSE SERPL-MCNC: 96 MG/DL (ref 65–99)
GLUCOSE UR QL: NEGATIVE
GLUCOSE UR QL: NEGATIVE MG/DL
GLUCOSE UR STRIP-MCNC: NEGATIVE MG/DL
HCT VFR BLD AUTO: 27.3 % (ref 34–46.6)
HCT VFR BLD AUTO: 28.6 % (ref 34–46.6)
HCT VFR BLD AUTO: 29.1 % (ref 34–46.6)
HCT VFR BLD AUTO: 29.2 % (ref 34–46.6)
HCT VFR BLD AUTO: 30.5 % (ref 34–46.6)
HCT VFR BLD AUTO: 30.6 % (ref 34–46.6)
HCT VFR BLD AUTO: 31.6 % (ref 34–46.6)
HCT VFR BLD AUTO: 31.8 % (ref 34–46.6)
HCT VFR BLD AUTO: 31.8 % (ref 34–46.6)
HCT VFR BLD AUTO: 31.9 % (ref 34–46.6)
HCT VFR BLD AUTO: 32.1 % (ref 34–46.6)
HCT VFR BLD AUTO: 32.4 % (ref 34–46.6)
HCT VFR BLD AUTO: 33.9 % (ref 34–46.6)
HCT VFR BLD AUTO: 34 % (ref 34–46.6)
HCT VFR BLD AUTO: 34.1 % (ref 34–46.6)
HCT VFR BLD AUTO: 34.4 % (ref 34–46.6)
HCT VFR BLD AUTO: 34.6 % (ref 34–46.6)
HCT VFR BLD AUTO: 35.3 % (ref 34–46.6)
HCT VFR BLD AUTO: 35.7 % (ref 34–46.6)
HCT VFR BLD AUTO: 36.1 % (ref 34–46.6)
HCT VFR BLD AUTO: 36.8 % (ref 34–46.6)
HCT VFR BLD AUTO: 38.8 % (ref 34–46.6)
HCT VFR BLD AUTO: 39 % (ref 34–46.6)
HCT VFR BLD AUTO: 42.3 % (ref 37–47)
HCT VFR BLD AUTO: 42.8 % (ref 34–46.6)
HDLC SERPL-MCNC: 53 MG/DL (ref 40–60)
HGB BLD-MCNC: 10.1 G/DL (ref 12–15.9)
HGB BLD-MCNC: 10.1 G/DL (ref 12–15.9)
HGB BLD-MCNC: 10.3 G/DL (ref 12–15.9)
HGB BLD-MCNC: 10.4 G/DL (ref 12–15.9)
HGB BLD-MCNC: 10.6 G/DL (ref 12–15.9)
HGB BLD-MCNC: 10.8 G/DL (ref 12–15.9)
HGB BLD-MCNC: 10.8 G/DL (ref 12–15.9)
HGB BLD-MCNC: 10.9 G/DL (ref 12–15.9)
HGB BLD-MCNC: 11 G/DL (ref 12–15.9)
HGB BLD-MCNC: 11 G/DL (ref 12–15.9)
HGB BLD-MCNC: 11.2 G/DL (ref 12–15.9)
HGB BLD-MCNC: 11.5 G/DL (ref 12–15.9)
HGB BLD-MCNC: 11.8 G/DL (ref 12–15.9)
HGB BLD-MCNC: 11.9 G/DL (ref 12–15.9)
HGB BLD-MCNC: 12.8 G/DL (ref 12–15.9)
HGB BLD-MCNC: 13.5 G/DL (ref 12–16)
HGB BLD-MCNC: 14.1 G/DL (ref 12–15.9)
HGB BLD-MCNC: 8.7 G/DL (ref 12–15.9)
HGB BLD-MCNC: 9 G/DL (ref 12–15.9)
HGB BLD-MCNC: 9.4 G/DL (ref 12–15.9)
HGB BLD-MCNC: 9.6 G/DL (ref 12–15.9)
HGB BLD-MCNC: 9.6 G/DL (ref 12–15.9)
HGB BLD-MCNC: 9.8 G/DL (ref 12–15.9)
HGB BLD-MCNC: 9.8 G/DL (ref 12–15.9)
HGB UR QL STRIP.AUTO: ABNORMAL
HGB UR QL STRIP.AUTO: NEGATIVE
HGB UR QL STRIP: NEGATIVE
HGB UR QL STRIP: NORMAL
HOLD SPECIMEN: NORMAL
HYALINE CASTS UR QL AUTO: ABNORMAL /LPF
IMM GRANULOCYTES # BLD AUTO: 0.03 10*3/MM3 (ref 0–0.05)
IMM GRANULOCYTES # BLD AUTO: 0.04 10*3/MM3 (ref 0–0.05)
IMM GRANULOCYTES # BLD AUTO: 0.04 10*3/MM3 (ref 0–0.05)
IMM GRANULOCYTES # BLD AUTO: 0.05 10*3/MM3 (ref 0–0.05)
IMM GRANULOCYTES # BLD AUTO: 0.06 10*3/MM3 (ref 0–0.05)
IMM GRANULOCYTES # BLD AUTO: 0.07 10*3/MM3 (ref 0–0.05)
IMM GRANULOCYTES # BLD AUTO: 0.08 10*3/MM3 (ref 0–0.05)
IMM GRANULOCYTES # BLD AUTO: 0.08 10*3/MM3 (ref 0–0.05)
IMM GRANULOCYTES # BLD AUTO: 0.1 10*3/MM3 (ref 0–0.05)
IMM GRANULOCYTES # BLD AUTO: 0.11 10*3/MM3 (ref 0–0.05)
IMM GRANULOCYTES # BLD AUTO: 0.11 10*3/MM3 (ref 0–0.05)
IMM GRANULOCYTES # BLD AUTO: 0.14 10*3/MM3 (ref 0–0.05)
IMM GRANULOCYTES NFR BLD AUTO: 0.3 % (ref 0–0.5)
IMM GRANULOCYTES NFR BLD AUTO: 0.4 % (ref 0–0.5)
IMM GRANULOCYTES NFR BLD AUTO: 0.5 % (ref 0–0.5)
IMM GRANULOCYTES NFR BLD AUTO: 0.6 % (ref 0–0.5)
IMM GRANULOCYTES NFR BLD AUTO: 0.7 % (ref 0–0.5)
IMM GRANULOCYTES NFR BLD AUTO: 0.8 % (ref 0–0.5)
INR PPP: 1.26 (ref 2–3)
INR PPP: 1.29 (ref 2–3)
INR PPP: 1.36 (ref 2–3)
INR PPP: 1.4 (ref 2–3)
INR PPP: 1.43 (ref 2–3)
INR PPP: 1.5 (ref 2–3)
INR PPP: 1.55 (ref 2–3)
INR PPP: 1.73 (ref 2–3)
INR PPP: 1.74 (ref 2–3)
INR PPP: 1.78 (ref 2–3)
INR PPP: 1.96 (ref 2–3)
INR PPP: 1.96 (ref 2–3)
INR PPP: 1.97 (ref 2–3)
INR PPP: 1.98 (ref 2–3)
INR PPP: 1.99 (ref 2–3)
INR PPP: 2.03 (ref 2–3)
INR PPP: 2.05 (ref 2–3)
INR PPP: 2.06 (ref 2–3)
INR PPP: 2.06 (ref 2–3)
INR PPP: 2.07 (ref 2–3)
INR PPP: 2.08 (ref 2–3)
INR PPP: 2.11 (ref 2–3)
INR PPP: 2.11 (ref 2–3)
INR PPP: 2.12 (ref 2–3)
INR PPP: 2.32 (ref 2–3)
INR PPP: 2.34 (ref 2–3)
INR PPP: 2.35 (ref 2–3)
INR PPP: 2.37 (ref 2–3)
INR PPP: 2.37 (ref 2–3)
INR PPP: 2.4 (ref 2–3)
INR PPP: 2.43 (ref 2–3)
INR PPP: 2.49 (ref 2–3)
INR PPP: 2.54 (ref 2–3)
INR PPP: 2.65 (ref 2–3)
INR PPP: 2.66 (ref 2–3)
INR PPP: 2.71 (ref 2–3)
INR PPP: 2.98 (ref 2–3)
INR PPP: 3.19 (ref 2–3)
INR PPP: 3.2 (ref 2–3)
INR PPP: 3.7 (ref 2–3)
INR PPP: 3.73 (ref 2–3)
INR PPP: 3.99 (ref 2–3)
INR PPP: 4.04 (ref 2–3)
INR PPP: 4.05 (ref 2–3)
INR PPP: 4.34 (ref 2–3)
INR PPP: 4.45 (ref 2–3)
INR PPP: 4.85 (ref 2–3)
INR PPP: 4.96 (ref 2–3)
INR PPP: 5.1 (ref 2–3)
INR PPP: 7.72 (ref 2–3)
INTERNATIONAL NORMALIZED RATIO (INR) RANGE: NORMAL
IVRT: 66 MSEC
KETONES UR QL STRIP: ABNORMAL
KETONES UR QL STRIP: ABNORMAL MG/DL
KETONES UR QL STRIP: NEGATIVE
KETONES UR QL STRIP: NEGATIVE MG/DL
KETONES UR QL STRIP: NEGATIVE MG/DL
KETONES UR QL STRIP: NORMAL
L PNEUMO1 AG UR QL IA: NEGATIVE
L PNEUMO1 AG UR QL IA: NEGATIVE
LDLC SERPL CALC-MCNC: 224 MG/DL (ref 0–100)
LDLC/HDLC SERPL: 4.23 {RATIO}
LEFT ATRIUM VOLUME INDEX: 11.3 ML/M2
LEUKOCYTE ESTERASE UR QL STRIP.AUTO: ABNORMAL
LEUKOCYTE ESTERASE UR QL STRIP.AUTO: NEGATIVE
LEUKOCYTE ESTERASE UR QL STRIP.AUTO: NEGATIVE
LEUKOCYTE ESTERASE UR QL STRIP: ABNORMAL
LEUKOCYTE ESTERASE UR QL STRIP: NORMAL
LEVOFLOXACIN SUSC ISLT: 1
LEVOFLOXACIN SUSC ISLT: 2
LEVOFLOXACIN SUSC ISLT: <=0.12
LINEZOLID SUSC ISLT: 2
LINEZOLID SUSC ISLT: 4
LV EF 2D ECHO EST: 55 %
LYMPHOCYTES # BLD AUTO: 1.31 10*3/MM3 (ref 0.7–3.1)
LYMPHOCYTES # BLD AUTO: 1.38 10*3/MM3 (ref 0.7–3.1)
LYMPHOCYTES # BLD AUTO: 1.44 10*3/MM3 (ref 0.7–3.1)
LYMPHOCYTES # BLD AUTO: 1.56 10*3/MM3 (ref 0.7–3.1)
LYMPHOCYTES # BLD AUTO: 1.79 10*3/MM3 (ref 0.7–3.1)
LYMPHOCYTES # BLD AUTO: 1.83 10*3/MM3 (ref 0.7–3.1)
LYMPHOCYTES # BLD AUTO: 1.88 10*3/MM3 (ref 0.7–3.1)
LYMPHOCYTES # BLD AUTO: 1.93 10*3/MM3 (ref 0.7–3.1)
LYMPHOCYTES # BLD AUTO: 2.01 10*3/MM3 (ref 0.7–3.1)
LYMPHOCYTES # BLD AUTO: 2.07 10*3/MM3 (ref 0.7–3.1)
LYMPHOCYTES # BLD AUTO: 2.15 10*3/MM3 (ref 0.7–3.1)
LYMPHOCYTES # BLD AUTO: 2.23 10*3/MM3 (ref 0.7–3.1)
LYMPHOCYTES # BLD AUTO: 2.31 10*3/MM3 (ref 0.7–3.1)
LYMPHOCYTES # BLD AUTO: 2.37 10*3/MM3 (ref 0.7–3.1)
LYMPHOCYTES # BLD AUTO: 2.38 10*3/MM3 (ref 0.7–3.1)
LYMPHOCYTES # BLD AUTO: 2.39 10*3/MM3 (ref 0.7–3.1)
LYMPHOCYTES # BLD AUTO: 2.43 10*3/MM3 (ref 0.7–3.1)
LYMPHOCYTES # BLD AUTO: 2.61 10*3/MM3 (ref 0.7–3.1)
LYMPHOCYTES # BLD AUTO: 2.62 10*3/MM3 (ref 0.7–3.1)
LYMPHOCYTES # BLD AUTO: 2.62 10*3/MM3 (ref 0.7–3.1)
LYMPHOCYTES # BLD AUTO: 2.81 10*3/MM3 (ref 0.7–3.1)
LYMPHOCYTES # BLD AUTO: 2.96 10*3/MM3 (ref 0.7–3.1)
LYMPHOCYTES # BLD AUTO: 3.24 10*3/MM3 (ref 0.7–3.1)
LYMPHOCYTES # BLD AUTO: 3.32 10*3/MM3 (ref 0.7–3.1)
LYMPHOCYTES # BLD AUTO: 3.45 10*3/MM3 (ref 0.7–3.1)
LYMPHOCYTES # BLD AUTO: 3.52 10*3/MM3 (ref 0.7–3.1)
LYMPHOCYTES # BLD AUTO: 3.72 10*3/MM3 (ref 0.7–3.1)
LYMPHOCYTES # BLD AUTO: 3.75 10*3/MM3 (ref 0.7–3.1)
LYMPHOCYTES # BLD AUTO: 3.86 10*3/UL (ref 1–5)
LYMPHOCYTES NFR BLD AUTO: 10.4 % (ref 19.6–45.3)
LYMPHOCYTES NFR BLD AUTO: 13.9 % (ref 19.6–45.3)
LYMPHOCYTES NFR BLD AUTO: 14.3 % (ref 19.6–45.3)
LYMPHOCYTES NFR BLD AUTO: 14.9 % (ref 19.6–45.3)
LYMPHOCYTES NFR BLD AUTO: 16.9 % (ref 19.6–45.3)
LYMPHOCYTES NFR BLD AUTO: 17.2 % (ref 19.6–45.3)
LYMPHOCYTES NFR BLD AUTO: 17.4 % (ref 19.6–45.3)
LYMPHOCYTES NFR BLD AUTO: 18.1 % (ref 19.6–45.3)
LYMPHOCYTES NFR BLD AUTO: 18.4 % (ref 19.6–45.3)
LYMPHOCYTES NFR BLD AUTO: 19.1 % (ref 19.6–45.3)
LYMPHOCYTES NFR BLD AUTO: 19.2 % (ref 19.6–45.3)
LYMPHOCYTES NFR BLD AUTO: 19.3 % (ref 19.6–45.3)
LYMPHOCYTES NFR BLD AUTO: 20.4 % (ref 19.6–45.3)
LYMPHOCYTES NFR BLD AUTO: 20.5 % (ref 19.6–45.3)
LYMPHOCYTES NFR BLD AUTO: 21.3 % (ref 19.6–45.3)
LYMPHOCYTES NFR BLD AUTO: 22 % (ref 19.6–45.3)
LYMPHOCYTES NFR BLD AUTO: 22.1 % (ref 19.6–45.3)
LYMPHOCYTES NFR BLD AUTO: 22.2 % (ref 19.6–45.3)
LYMPHOCYTES NFR BLD AUTO: 22.2 % (ref 19.6–45.3)
LYMPHOCYTES NFR BLD AUTO: 22.6 % (ref 19.6–45.3)
LYMPHOCYTES NFR BLD AUTO: 23 % (ref 19.6–45.3)
LYMPHOCYTES NFR BLD AUTO: 23.3 % (ref 19.6–45.3)
LYMPHOCYTES NFR BLD AUTO: 24.4 % (ref 19.6–45.3)
LYMPHOCYTES NFR BLD AUTO: 26.3 % (ref 19.6–45.3)
LYMPHOCYTES NFR BLD AUTO: 27.4 % (ref 19.6–45.3)
LYMPHOCYTES NFR BLD AUTO: 29.3 % (ref 19.6–45.3)
LYMPHOCYTES NFR BLD AUTO: 35.6 % (ref 19.6–45.3)
LYMPHOCYTES NFR BLD AUTO: 37.7 % (ref 20–45)
LYMPHOCYTES NFR BLD AUTO: 7.2 % (ref 19.6–45.3)
M PNEUMO IGM SER QL: NEGATIVE
MAGNESIUM SERPL-MCNC: 1.5 MG/DL (ref 1.6–2.4)
MAGNESIUM SERPL-MCNC: 1.5 MG/DL (ref 1.6–2.4)
MAGNESIUM SERPL-MCNC: 1.6 MG/DL (ref 1.6–2.4)
MAGNESIUM SERPL-MCNC: 1.7 MG/DL (ref 1.6–2.4)
MAGNESIUM SERPL-MCNC: 1.8 MG/DL (ref 1.6–2.4)
MAGNESIUM SERPL-MCNC: 1.8 MG/DL (ref 1.6–2.4)
MAGNESIUM SERPL-MCNC: 1.9 MG/DL (ref 1.6–2.4)
MAGNESIUM SERPL-MCNC: 2 MG/DL (ref 1.6–2.4)
MAGNESIUM SERPL-MCNC: 2.1 MG/DL (ref 1.6–2.4)
MAGNESIUM SERPL-MCNC: 2.5 MG/DL (ref 1.6–2.4)
MAXIMAL PREDICTED HEART RATE: 139 BPM
MCH RBC QN AUTO: 29.3 PG (ref 26.6–33)
MCH RBC QN AUTO: 29.4 PG (ref 26.6–33)
MCH RBC QN AUTO: 29.4 PG (ref 26.6–33)
MCH RBC QN AUTO: 29.5 PG (ref 26.6–33)
MCH RBC QN AUTO: 29.7 PG (ref 26.6–33)
MCH RBC QN AUTO: 29.9 PG (ref 26.6–33)
MCH RBC QN AUTO: 30.3 PG (ref 26.6–33)
MCH RBC QN AUTO: 30.6 PG (ref 26.6–33)
MCH RBC QN AUTO: 30.7 PG (ref 26.6–33)
MCH RBC QN AUTO: 30.7 PG (ref 26.6–33)
MCH RBC QN AUTO: 30.8 PG (ref 26.6–33)
MCH RBC QN AUTO: 30.9 PG (ref 26.6–33)
MCH RBC QN AUTO: 31.2 PG (ref 26.6–33)
MCH RBC QN AUTO: 31.2 PG (ref 26.6–33)
MCH RBC QN AUTO: 31.6 PG (ref 26.6–33)
MCH RBC QN AUTO: 31.7 PG (ref 26.6–33)
MCH RBC QN AUTO: 31.7 PG (ref 26.6–33)
MCH RBC QN AUTO: 31.8 PG (ref 26.6–33)
MCH RBC QN AUTO: 32 PG (ref 26.6–33)
MCH RBC QN AUTO: 32.1 PG (ref 26.6–33)
MCH RBC QN AUTO: 32.1 PG (ref 27–31)
MCH RBC QN AUTO: 32.2 PG (ref 26.6–33)
MCH RBC QN AUTO: 32.2 PG (ref 26.6–33)
MCH RBC QN AUTO: 32.4 PG (ref 26.6–33)
MCH RBC QN AUTO: 32.5 PG (ref 26.6–33)
MCH RBC QN AUTO: 32.7 PG (ref 26.6–33)
MCH RBC QN AUTO: 32.9 PG (ref 26.6–33)
MCH RBC QN AUTO: 33.3 PG (ref 26.6–33)
MCHC RBC AUTO-ENTMCNC: 30.4 G/DL (ref 31.5–35.7)
MCHC RBC AUTO-ENTMCNC: 30.7 G/DL (ref 31.5–35.7)
MCHC RBC AUTO-ENTMCNC: 30.8 G/DL (ref 31.5–35.7)
MCHC RBC AUTO-ENTMCNC: 30.8 G/DL (ref 31.5–35.7)
MCHC RBC AUTO-ENTMCNC: 31.4 G/DL (ref 31.5–35.7)
MCHC RBC AUTO-ENTMCNC: 31.5 G/DL (ref 31.5–35.7)
MCHC RBC AUTO-ENTMCNC: 31.7 G/DL (ref 31.5–35.7)
MCHC RBC AUTO-ENTMCNC: 31.8 G/DL (ref 31.5–35.7)
MCHC RBC AUTO-ENTMCNC: 31.9 G/DL (ref 31.5–35.7)
MCHC RBC AUTO-ENTMCNC: 31.9 G/DL (ref 31.5–35.7)
MCHC RBC AUTO-ENTMCNC: 31.9 G/DL (ref 33–37)
MCHC RBC AUTO-ENTMCNC: 32 G/DL (ref 31.5–35.7)
MCHC RBC AUTO-ENTMCNC: 32 G/DL (ref 31.5–35.7)
MCHC RBC AUTO-ENTMCNC: 32.1 G/DL (ref 31.5–35.7)
MCHC RBC AUTO-ENTMCNC: 32.2 G/DL (ref 31.5–35.7)
MCHC RBC AUTO-ENTMCNC: 32.3 G/DL (ref 31.5–35.7)
MCHC RBC AUTO-ENTMCNC: 32.4 G/DL (ref 31.5–35.7)
MCHC RBC AUTO-ENTMCNC: 32.7 G/DL (ref 31.5–35.7)
MCHC RBC AUTO-ENTMCNC: 32.8 G/DL (ref 31.5–35.7)
MCHC RBC AUTO-ENTMCNC: 32.9 G/DL (ref 31.5–35.7)
MCHC RBC AUTO-ENTMCNC: 33 G/DL (ref 31.5–35.7)
MCV RBC AUTO: 100 FL (ref 79–97)
MCV RBC AUTO: 100.6 FL (ref 79–97)
MCV RBC AUTO: 100.7 FL (ref 81–99)
MCV RBC AUTO: 104.3 FL (ref 79–97)
MCV RBC AUTO: 92.2 FL (ref 79–97)
MCV RBC AUTO: 93 FL (ref 79–97)
MCV RBC AUTO: 93.6 FL (ref 79–97)
MCV RBC AUTO: 93.8 FL (ref 79–97)
MCV RBC AUTO: 95.3 FL (ref 79–97)
MCV RBC AUTO: 95.5 FL (ref 79–97)
MCV RBC AUTO: 95.5 FL (ref 79–97)
MCV RBC AUTO: 95.8 FL (ref 79–97)
MCV RBC AUTO: 96.4 FL (ref 79–97)
MCV RBC AUTO: 96.6 FL (ref 79–97)
MCV RBC AUTO: 97 FL (ref 79–97)
MCV RBC AUTO: 97.7 FL (ref 79–97)
MCV RBC AUTO: 97.7 FL (ref 79–97)
MCV RBC AUTO: 98.1 FL (ref 79–97)
MCV RBC AUTO: 98.1 FL (ref 79–97)
MCV RBC AUTO: 98.3 FL (ref 79–97)
MCV RBC AUTO: 98.5 FL (ref 79–97)
MCV RBC AUTO: 99.3 FL (ref 79–97)
MCV RBC AUTO: 99.5 FL (ref 79–97)
MCV RBC AUTO: 99.7 FL (ref 79–97)
MCV RBC AUTO: 99.7 FL (ref 79–97)
MCV RBC AUTO: 99.8 FL (ref 79–97)
MDMA UR QL SCN: NEGATIVE
METHADONE UR QL SCN: NEGATIVE
MONOCYTES # BLD AUTO: 0.63 10*3/MM3 (ref 0.1–0.9)
MONOCYTES # BLD AUTO: 0.67 10*3/MM3 (ref 0.1–0.9)
MONOCYTES # BLD AUTO: 0.72 10*3/MM3 (ref 0.1–0.9)
MONOCYTES # BLD AUTO: 0.73 10*3/MM3 (ref 0.1–0.9)
MONOCYTES # BLD AUTO: 0.88 10*3/MM3 (ref 0.1–0.9)
MONOCYTES # BLD AUTO: 0.94 10*3/MM3 (ref 0.1–0.9)
MONOCYTES # BLD AUTO: 0.94 10*3/UL (ref 0.2–1.2)
MONOCYTES # BLD AUTO: 0.98 10*3/MM3 (ref 0.1–0.9)
MONOCYTES # BLD AUTO: 1 10*3/MM3 (ref 0.1–0.9)
MONOCYTES # BLD AUTO: 1 10*3/MM3 (ref 0.1–0.9)
MONOCYTES # BLD AUTO: 1.02 10*3/MM3 (ref 0.1–0.9)
MONOCYTES # BLD AUTO: 1.05 10*3/MM3 (ref 0.1–0.9)
MONOCYTES # BLD AUTO: 1.09 10*3/MM3 (ref 0.1–0.9)
MONOCYTES # BLD AUTO: 1.13 10*3/MM3 (ref 0.1–0.9)
MONOCYTES # BLD AUTO: 1.18 10*3/MM3 (ref 0.1–0.9)
MONOCYTES # BLD AUTO: 1.22 10*3/MM3 (ref 0.1–0.9)
MONOCYTES # BLD AUTO: 1.29 10*3/MM3 (ref 0.1–0.9)
MONOCYTES # BLD AUTO: 1.3 10*3/MM3 (ref 0.1–0.9)
MONOCYTES # BLD AUTO: 1.32 10*3/MM3 (ref 0.1–0.9)
MONOCYTES # BLD AUTO: 1.34 10*3/MM3 (ref 0.1–0.9)
MONOCYTES # BLD AUTO: 1.35 10*3/MM3 (ref 0.1–0.9)
MONOCYTES # BLD AUTO: 1.4 10*3/MM3 (ref 0.1–0.9)
MONOCYTES # BLD AUTO: 1.45 10*3/MM3 (ref 0.1–0.9)
MONOCYTES # BLD AUTO: 1.45 10*3/MM3 (ref 0.1–0.9)
MONOCYTES # BLD AUTO: 1.52 10*3/MM3 (ref 0.1–0.9)
MONOCYTES # BLD AUTO: 1.52 10*3/MM3 (ref 0.1–0.9)
MONOCYTES # BLD AUTO: 2.19 10*3/MM3 (ref 0.1–0.9)
MONOCYTES # BLD AUTO: 2.19 10*3/MM3 (ref 0.1–0.9)
MONOCYTES # BLD AUTO: 2.46 10*3/MM3 (ref 0.1–0.9)
MONOCYTES NFR BLD AUTO: 10.1 % (ref 5–12)
MONOCYTES NFR BLD AUTO: 10.4 % (ref 5–12)
MONOCYTES NFR BLD AUTO: 11 % (ref 5–12)
MONOCYTES NFR BLD AUTO: 11.1 % (ref 5–12)
MONOCYTES NFR BLD AUTO: 11.5 % (ref 5–12)
MONOCYTES NFR BLD AUTO: 11.6 % (ref 5–12)
MONOCYTES NFR BLD AUTO: 12 % (ref 5–12)
MONOCYTES NFR BLD AUTO: 12.5 % (ref 5–12)
MONOCYTES NFR BLD AUTO: 12.9 % (ref 5–12)
MONOCYTES NFR BLD AUTO: 13.5 % (ref 5–12)
MONOCYTES NFR BLD AUTO: 14 % (ref 5–12)
MONOCYTES NFR BLD AUTO: 14.7 % (ref 5–12)
MONOCYTES NFR BLD AUTO: 7.4 % (ref 5–12)
MONOCYTES NFR BLD AUTO: 7.6 % (ref 5–12)
MONOCYTES NFR BLD AUTO: 7.7 % (ref 5–12)
MONOCYTES NFR BLD AUTO: 7.8 % (ref 5–12)
MONOCYTES NFR BLD AUTO: 7.8 % (ref 5–12)
MONOCYTES NFR BLD AUTO: 8 % (ref 5–12)
MONOCYTES NFR BLD AUTO: 8.4 % (ref 5–12)
MONOCYTES NFR BLD AUTO: 8.4 % (ref 5–12)
MONOCYTES NFR BLD AUTO: 8.5 % (ref 5–12)
MONOCYTES NFR BLD AUTO: 8.6 % (ref 5–12)
MONOCYTES NFR BLD AUTO: 8.9 % (ref 5–12)
MONOCYTES NFR BLD AUTO: 8.9 % (ref 5–12)
MONOCYTES NFR BLD AUTO: 9.2 % (ref 3–10)
MONOCYTES NFR BLD AUTO: 9.4 % (ref 5–12)
MONOCYTES NFR BLD AUTO: 9.7 % (ref 5–12)
MONOCYTES NFR BLD AUTO: 9.7 % (ref 5–12)
MONOCYTES NFR BLD AUTO: 9.8 % (ref 5–12)
MRSA DNA SPEC QL NAA+PROBE: NORMAL
MUCOUS THREADS URNS QL MICRO: ABNORMAL
MUCOUS THREADS URNS QL MICRO: ABNORMAL /HPF
MUCOUS THREADS URNS QL MICRO: ABNORMAL /HPF
NEUTROPHILS # BLD AUTO: 5.11 10*3/UL (ref 2–8)
NEUTROPHILS NFR BLD AUTO: 10.38 10*3/MM3 (ref 1.7–7)
NEUTROPHILS NFR BLD AUTO: 11.93 10*3/MM3 (ref 1.7–7)
NEUTROPHILS NFR BLD AUTO: 13.16 10*3/MM3 (ref 1.7–7)
NEUTROPHILS NFR BLD AUTO: 14.56 10*3/MM3 (ref 1.7–7)
NEUTROPHILS NFR BLD AUTO: 49.9 % (ref 30–85)
NEUTROPHILS NFR BLD AUTO: 5.54 10*3/MM3 (ref 1.7–7)
NEUTROPHILS NFR BLD AUTO: 5.65 10*3/MM3 (ref 1.7–7)
NEUTROPHILS NFR BLD AUTO: 5.69 10*3/MM3 (ref 1.7–7)
NEUTROPHILS NFR BLD AUTO: 5.71 10*3/MM3 (ref 1.7–7)
NEUTROPHILS NFR BLD AUTO: 5.72 10*3/MM3 (ref 1.7–7)
NEUTROPHILS NFR BLD AUTO: 53.1 % (ref 42.7–76)
NEUTROPHILS NFR BLD AUTO: 57.8 % (ref 42.7–76)
NEUTROPHILS NFR BLD AUTO: 58.2 % (ref 42.7–76)
NEUTROPHILS NFR BLD AUTO: 58.4 % (ref 42.7–76)
NEUTROPHILS NFR BLD AUTO: 58.5 % (ref 42.7–76)
NEUTROPHILS NFR BLD AUTO: 6.21 10*3/MM3 (ref 1.7–7)
NEUTROPHILS NFR BLD AUTO: 6.61 10*3/MM3 (ref 1.7–7)
NEUTROPHILS NFR BLD AUTO: 6.86 10*3/MM3 (ref 1.7–7)
NEUTROPHILS NFR BLD AUTO: 60.1 % (ref 42.7–76)
NEUTROPHILS NFR BLD AUTO: 60.6 % (ref 42.7–76)
NEUTROPHILS NFR BLD AUTO: 61 % (ref 42.7–76)
NEUTROPHILS NFR BLD AUTO: 62.2 % (ref 42.7–76)
NEUTROPHILS NFR BLD AUTO: 63.9 % (ref 42.7–76)
NEUTROPHILS NFR BLD AUTO: 63.9 % (ref 42.7–76)
NEUTROPHILS NFR BLD AUTO: 64.5 % (ref 42.7–76)
NEUTROPHILS NFR BLD AUTO: 64.6 % (ref 42.7–76)
NEUTROPHILS NFR BLD AUTO: 65.3 % (ref 42.7–76)
NEUTROPHILS NFR BLD AUTO: 66.6 % (ref 42.7–76)
NEUTROPHILS NFR BLD AUTO: 67.1 % (ref 42.7–76)
NEUTROPHILS NFR BLD AUTO: 67.3 % (ref 42.7–76)
NEUTROPHILS NFR BLD AUTO: 68.1 % (ref 42.7–76)
NEUTROPHILS NFR BLD AUTO: 68.2 % (ref 42.7–76)
NEUTROPHILS NFR BLD AUTO: 69.1 % (ref 42.7–76)
NEUTROPHILS NFR BLD AUTO: 69.9 % (ref 42.7–76)
NEUTROPHILS NFR BLD AUTO: 7.01 10*3/MM3 (ref 1.7–7)
NEUTROPHILS NFR BLD AUTO: 7.09 10*3/MM3 (ref 1.7–7)
NEUTROPHILS NFR BLD AUTO: 7.18 10*3/MM3 (ref 1.7–7)
NEUTROPHILS NFR BLD AUTO: 7.24 10*3/MM3 (ref 1.7–7)
NEUTROPHILS NFR BLD AUTO: 7.35 10*3/MM3 (ref 1.7–7)
NEUTROPHILS NFR BLD AUTO: 7.36 10*3/MM3 (ref 1.7–7)
NEUTROPHILS NFR BLD AUTO: 7.48 10*3/MM3 (ref 1.7–7)
NEUTROPHILS NFR BLD AUTO: 7.6 10*3/MM3 (ref 1.7–7)
NEUTROPHILS NFR BLD AUTO: 7.78 10*3/MM3 (ref 1.7–7)
NEUTROPHILS NFR BLD AUTO: 7.93 10*3/MM3 (ref 1.7–7)
NEUTROPHILS NFR BLD AUTO: 70.2 % (ref 42.7–76)
NEUTROPHILS NFR BLD AUTO: 74.7 % (ref 42.7–76)
NEUTROPHILS NFR BLD AUTO: 74.9 % (ref 42.7–76)
NEUTROPHILS NFR BLD AUTO: 76.2 % (ref 42.7–76)
NEUTROPHILS NFR BLD AUTO: 79.5 % (ref 42.7–76)
NEUTROPHILS NFR BLD AUTO: 8.44 10*3/MM3 (ref 1.7–7)
NEUTROPHILS NFR BLD AUTO: 8.52 10*3/MM3 (ref 1.7–7)
NEUTROPHILS NFR BLD AUTO: 8.88 10*3/MM3 (ref 1.7–7)
NEUTROPHILS NFR BLD AUTO: 9.14 10*3/MM3 (ref 1.7–7)
NEUTROPHILS NFR BLD AUTO: 9.85 10*3/MM3 (ref 1.7–7)
NEUTROPHILS NFR BLD AUTO: 9.86 10*3/MM3 (ref 1.7–7)
NITRITE UR QL STRIP: NEGATIVE
NITRITE UR QL STRIP: POSITIVE
NITROFURANTOIN SUSC ISLT: <=16
NRBC BLD AUTO-RTO: 0 /100 WBC (ref 0–0.2)
NRBC CBCN: 0 % (ref 0–0.7)
NT-PROBNP SERPL-MCNC: 1133 PG/ML (ref 0–1800)
NT-PROBNP SERPL-MCNC: 318.1 PG/ML (ref 0–1800)
NT-PROBNP SERPL-MCNC: 545 PG/ML (ref 0–1800)
NT-PROBNP SERPL-MCNC: 605.3 PG/ML (ref 0–1800)
OPIATES UR QL SCN: NEGATIVE
OSMOLALITY SERPL CALC.SUM OF ELEC: 292 MOSM/KG (ref 273–304)
OSMOLALITY SERPL CALC.SUM OF ELEC: 296 MOSM/KG (ref 273–304)
OXYCODONE UR QL SCN: NEGATIVE
PCP UR QL: NEGATIVE
PH UR STRIP.AUTO: 5 [PH]
PH UR STRIP.AUTO: 5.5 [PH] (ref 5–8)
PH UR STRIP.AUTO: 6 [PH]
PH UR STRIP.AUTO: 6 [PH] (ref 5–8)
PH UR STRIP.AUTO: 6.5 [PH]
PH UR STRIP.AUTO: 6.5 [PH] (ref 5–8)
PH UR STRIP.AUTO: 7 [PH] (ref 5–8)
PH UR STRIP.AUTO: 7 [PH] (ref 5–8)
PH UR STRIP.AUTO: 7.5 [PH] (ref 5–8)
PHOSPHATE SERPL-MCNC: 1.6 MG/DL (ref 2.5–4.5)
PHOSPHATE SERPL-MCNC: 2.1 MG/DL (ref 2.5–4.5)
PHOSPHATE SERPL-MCNC: 2.3 MG/DL (ref 2.5–4.5)
PHOSPHATE SERPL-MCNC: 2.5 MG/DL (ref 2.5–4.5)
PHOSPHATE SERPL-MCNC: 2.9 MG/DL (ref 2.5–4.5)
PHOSPHATE SERPL-MCNC: 3.1 MG/DL (ref 2.5–4.5)
PHOSPHATE SERPL-MCNC: 3.2 MG/DL (ref 2.5–4.5)
PHOSPHATE SERPL-MCNC: 3.5 MG/DL (ref 2.5–4.5)
PHOSPHATE SERPL-MCNC: 3.8 MG/DL (ref 2.5–4.5)
PHOSPHATE SERPL-MCNC: 4 MG/DL (ref 2.5–4.5)
PIP+TAZO SUSC ISLT: 8
PLATELET # BLD AUTO: 174 10*3/MM3 (ref 140–450)
PLATELET # BLD AUTO: 175 10*3/MM3 (ref 140–450)
PLATELET # BLD AUTO: 190 10*3/MM3 (ref 140–450)
PLATELET # BLD AUTO: 207 10*3/MM3 (ref 140–450)
PLATELET # BLD AUTO: 221 10*3/MM3 (ref 140–450)
PLATELET # BLD AUTO: 241 10*3/UL (ref 130–400)
PLATELET # BLD AUTO: 242 10*3/MM3 (ref 140–450)
PLATELET # BLD AUTO: 248 10*3/MM3 (ref 140–450)
PLATELET # BLD AUTO: 273 10*3/MM3 (ref 140–450)
PLATELET # BLD AUTO: 281 10*3/MM3 (ref 140–450)
PLATELET # BLD AUTO: 287 10*3/MM3 (ref 140–450)
PLATELET # BLD AUTO: 290 10*3/MM3 (ref 140–450)
PLATELET # BLD AUTO: 344 10*3/MM3 (ref 140–450)
PLATELET # BLD AUTO: 356 10*3/MM3 (ref 140–450)
PLATELET # BLD AUTO: 365 10*3/MM3 (ref 140–450)
PLATELET # BLD AUTO: 389 10*3/MM3 (ref 140–450)
PLATELET # BLD AUTO: 390 10*3/MM3 (ref 140–450)
PLATELET # BLD AUTO: 393 10*3/MM3 (ref 140–450)
PLATELET # BLD AUTO: 414 10*3/MM3 (ref 140–450)
PLATELET # BLD AUTO: 417 10*3/MM3 (ref 140–450)
PLATELET # BLD AUTO: 441 10*3/MM3 (ref 140–450)
PLATELET # BLD AUTO: 461 10*3/MM3 (ref 140–450)
PLATELET # BLD AUTO: 462 10*3/MM3 (ref 140–450)
PLATELET # BLD AUTO: 468 10*3/MM3 (ref 140–450)
PLATELET # BLD AUTO: 472 10*3/MM3 (ref 140–450)
PLATELET # BLD AUTO: 495 10*3/MM3 (ref 140–450)
PLATELET # BLD AUTO: 505 10*3/MM3 (ref 140–450)
PLATELET # BLD AUTO: 513 10*3/MM3 (ref 140–450)
PLATELET # BLD AUTO: 519 10*3/MM3 (ref 140–450)
PLATELET # BLD AUTO: 579 10*3/MM3 (ref 140–450)
PMV BLD AUTO: 10 FL (ref 6–12)
PMV BLD AUTO: 8.8 FL (ref 6–12)
PMV BLD AUTO: 8.9 FL (ref 6–12)
PMV BLD AUTO: 9 FL (ref 6–12)
PMV BLD AUTO: 9.1 FL (ref 6–12)
PMV BLD AUTO: 9.2 FL (ref 6–12)
PMV BLD AUTO: 9.2 FL (ref 9.4–12.3)
PMV BLD AUTO: 9.3 FL (ref 6–12)
PMV BLD AUTO: 9.4 FL (ref 6–12)
PMV BLD AUTO: 9.4 FL (ref 6–12)
PMV BLD AUTO: 9.5 FL (ref 6–12)
PMV BLD AUTO: 9.5 FL (ref 6–12)
PMV BLD AUTO: 9.6 FL (ref 6–12)
PMV BLD AUTO: 9.7 FL (ref 6–12)
PMV BLD AUTO: 9.7 FL (ref 6–12)
POTASSIUM SERPL-SCNC: 3 MMOL/L (ref 3.5–5.2)
POTASSIUM SERPL-SCNC: 3.1 MMOL/L (ref 3.5–5.2)
POTASSIUM SERPL-SCNC: 3.3 MMOL/L (ref 3.5–5.2)
POTASSIUM SERPL-SCNC: 3.4 MMOL/L (ref 3.5–5.2)
POTASSIUM SERPL-SCNC: 3.5 MMOL/L (ref 3.5–5.2)
POTASSIUM SERPL-SCNC: 3.6 MMOL/L (ref 3.5–5.2)
POTASSIUM SERPL-SCNC: 3.7 MMOL/L (ref 3.5–5.2)
POTASSIUM SERPL-SCNC: 3.8 MMOL/L (ref 3.5–5.2)
POTASSIUM SERPL-SCNC: 3.9 MMOL/L (ref 3.5–5.2)
POTASSIUM SERPL-SCNC: 4 MMOL/L (ref 3.5–5.2)
POTASSIUM SERPL-SCNC: 4.1 MMOL/L (ref 3.5–5.3)
POTASSIUM SERPL-SCNC: 4.2 MMOL/L (ref 3.5–5.2)
POTASSIUM SERPL-SCNC: 4.2 MMOL/L (ref 3.5–5.2)
POTASSIUM SERPL-SCNC: 4.2 MMOL/L (ref 3.5–5.3)
POTASSIUM SERPL-SCNC: 4.3 MMOL/L (ref 3.5–5.2)
POTASSIUM SERPL-SCNC: 4.3 MMOL/L (ref 3.5–5.2)
PROCALCITONIN SERPL-MCNC: 0.05 NG/ML (ref 0–0.25)
PROCALCITONIN SERPL-MCNC: 0.07 NG/ML (ref 0–0.25)
PROCALCITONIN SERPL-MCNC: 0.18 NG/ML (ref 0–0.25)
PROCALCITONIN SERPL-MCNC: 0.26 NG/ML (ref 0–0.25)
PROCALCITONIN SERPL-MCNC: 0.38 NG/ML (ref 0–0.25)
PROCALCITONIN SERPL-MCNC: 0.43 NG/ML (ref 0–0.25)
PROT SERPL-MCNC: 6.4 G/DL (ref 6–8.5)
PROT SERPL-MCNC: 6.7 G/DL (ref 6–8.5)
PROT SERPL-MCNC: 7.6 G/DL (ref 6–8.5)
PROT SERPL-MCNC: 7.6 G/DL (ref 6–8.5)
PROT SERPL-MCNC: 7.8 G/DL (ref 6–8.5)
PROT SERPL-MCNC: 7.8 G/DL (ref 6–8.5)
PROT SERPL-MCNC: 8.5 G/DL (ref 6–8.5)
PROT UR QL STRIP: ABNORMAL
PROT UR QL STRIP: NEGATIVE
PROT UR QL: ABNORMAL MG/DL
PROT UR QL: NEGATIVE MG/DL
PROT UR QL: NEGATIVE MG/DL
PROTHROMBIN TIME: 13.1 S (ref 9.4–12)
PROTHROMBIN TIME: 13.4 S (ref 9.4–12)
PROTHROMBIN TIME: 14.3 SECONDS (ref 9.4–12)
PROTHROMBIN TIME: 14.5 S (ref 9.4–12)
PROTHROMBIN TIME: 14.9 S (ref 9.4–12)
PROTHROMBIN TIME: 15.7 SECONDS (ref 9.4–12)
PROTHROMBIN TIME: 16.1 SECONDS (ref 9.4–12)
PROTHROMBIN TIME: 17.8 SECONDS (ref 9.4–12)
PROTHROMBIN TIME: 17.9 SECONDS (ref 9.4–12)
PROTHROMBIN TIME: 18.4 SECONDS (ref 9.4–12)
PROTHROMBIN TIME: 19.7 S (ref 9.4–12)
PROTHROMBIN TIME: 20.1 SECONDS (ref 9.4–12)
PROTHROMBIN TIME: 20.3 SECONDS (ref 9.4–12)
PROTHROMBIN TIME: 20.8 SECONDS (ref 9.4–12)
PROTHROMBIN TIME: 20.9 SECONDS (ref 9.4–12)
PROTHROMBIN TIME: 21 SECONDS (ref 9.4–12)
PROTHROMBIN TIME: 21.2 SECONDS (ref 9.4–12)
PROTHROMBIN TIME: 21.3 SECONDS (ref 9.4–12)
PROTHROMBIN TIME: 21.4 SECONDS (ref 9.4–12)
PROTHROMBIN TIME: 21.6 SECONDS (ref 9.4–12)
PROTHROMBIN TIME: 23.6 S (ref 9.4–12)
PROTHROMBIN TIME: 23.6 SECONDS (ref 9.4–12)
PROTHROMBIN TIME: 23.7 SECONDS (ref 9.4–12)
PROTHROMBIN TIME: 24.1 SECONDS (ref 9.4–12)
PROTHROMBIN TIME: 24.1 SECONDS (ref 9.4–12)
PROTHROMBIN TIME: 24.4 SECONDS (ref 9.4–12)
PROTHROMBIN TIME: 24.7 SECONDS (ref 9.4–12)
PROTHROMBIN TIME: 25.3 SECONDS (ref 9.4–12)
PROTHROMBIN TIME: 25.8 SECONDS (ref 9.4–12)
PROTHROMBIN TIME: 26.6 S (ref 9.4–12)
PROTHROMBIN TIME: 27 SECONDS (ref 9.4–12)
PROTHROMBIN TIME: 27.2 S (ref 9.4–12)
PROTHROMBIN TIME: 29.8 S (ref 9.4–12)
PROTHROMBIN TIME: 32 SECONDS (ref 9.4–12)
PROTHROMBIN TIME: 32.1 S (ref 9.4–12)
PROTHROMBIN TIME: 37.4 SECONDS (ref 9.4–12)
PROTHROMBIN TIME: 37.5 S (ref 9.4–12)
PROTHROMBIN TIME: 40.1 SECONDS (ref 9.4–12)
PROTHROMBIN TIME: 40.5 S (ref 9.4–12)
PROTHROMBIN TIME: 41.1 SECONDS (ref 9.4–12)
PROTHROMBIN TIME: 44.2 SECONDS (ref 9.4–12)
PROTHROMBIN TIME: 44.8 SECONDS (ref 9.4–12)
PROTHROMBIN TIME: 49.5 SECONDS (ref 9.4–12)
PROTHROMBIN TIME: 50 SECONDS (ref 9.4–12)
PROTHROMBIN TIME: 51.5 SECONDS (ref 9.4–12)
PROTHROMBIN TIME: 79.3 SECONDS (ref 9.4–12)
QT INTERVAL: 274 MS
QT INTERVAL: 346 MS
QT INTERVAL: 348 MS
QT INTERVAL: 356 MS
RBC # BLD AUTO: 2.75 10*6/MM3 (ref 3.77–5.28)
RBC # BLD AUTO: 2.91 10*6/MM3 (ref 3.77–5.28)
RBC # BLD AUTO: 2.98 10*6/MM3 (ref 3.77–5.28)
RBC # BLD AUTO: 3.01 10*6/MM3 (ref 3.77–5.28)
RBC # BLD AUTO: 3.03 10*6/MM3 (ref 3.77–5.28)
RBC # BLD AUTO: 3.17 10*6/MM3 (ref 3.77–5.28)
RBC # BLD AUTO: 3.21 10*6/MM3 (ref 3.77–5.28)
RBC # BLD AUTO: 3.21 10*6/MM3 (ref 3.77–5.28)
RBC # BLD AUTO: 3.24 10*6/MM3 (ref 3.77–5.28)
RBC # BLD AUTO: 3.25 10*6/MM3 (ref 3.77–5.28)
RBC # BLD AUTO: 3.28 10*6/MM3 (ref 3.77–5.28)
RBC # BLD AUTO: 3.32 10*6/MM3 (ref 3.77–5.28)
RBC # BLD AUTO: 3.33 10*6/MM3 (ref 3.77–5.28)
RBC # BLD AUTO: 3.34 10*6/MM3 (ref 3.77–5.28)
RBC # BLD AUTO: 3.36 10*6/MM3 (ref 3.77–5.28)
RBC # BLD AUTO: 3.36 10*6/MM3 (ref 3.77–5.28)
RBC # BLD AUTO: 3.4 10*6/MM3 (ref 3.77–5.28)
RBC # BLD AUTO: 3.4 10*6/MM3 (ref 3.77–5.28)
RBC # BLD AUTO: 3.42 10*6/MM3 (ref 3.77–5.28)
RBC # BLD AUTO: 3.54 10*6/MM3 (ref 3.77–5.28)
RBC # BLD AUTO: 3.54 10*6/MM3 (ref 3.77–5.28)
RBC # BLD AUTO: 3.56 10*6/MM3 (ref 3.77–5.28)
RBC # BLD AUTO: 3.68 10*6/MM3 (ref 3.77–5.28)
RBC # BLD AUTO: 3.75 10*6/MM3 (ref 3.77–5.28)
RBC # BLD AUTO: 3.77 10*6/MM3 (ref 3.77–5.28)
RBC # BLD AUTO: 3.85 10*6/MM3 (ref 3.77–5.28)
RBC # BLD AUTO: 3.92 10*6/MM3 (ref 3.77–5.28)
RBC # BLD AUTO: 3.94 10*6/MM3 (ref 3.77–5.28)
RBC # BLD AUTO: 4.2 10*6/UL (ref 4.2–5.4)
RBC # BLD AUTO: 4.29 10*6/MM3 (ref 3.77–5.28)
RBC # UR: ABNORMAL /HPF
RBC #/AREA URNS HPF: ABNORMAL /[HPF]
RBC CASTS #/AREA URNS LPF: ABNORMAL /LPF
REF LAB TEST METHOD: ABNORMAL
S PNEUM AG SPEC QL LA: NEGATIVE
S PNEUM AG SPEC QL LA: NEGATIVE
SARS-COV-2 RNA RESP QL NAA+PROBE: NOT DETECTED
SODIUM SERPL-SCNC: 132 MMOL/L (ref 136–145)
SODIUM SERPL-SCNC: 134 MMOL/L (ref 136–145)
SODIUM SERPL-SCNC: 135 MMOL/L (ref 136–145)
SODIUM SERPL-SCNC: 136 MMOL/L (ref 136–145)
SODIUM SERPL-SCNC: 137 MMOL/L (ref 136–145)
SODIUM SERPL-SCNC: 138 MMOL/L (ref 136–145)
SODIUM SERPL-SCNC: 139 MMOL/L (ref 136–145)
SODIUM SERPL-SCNC: 140 MMOL/L (ref 135–147)
SODIUM SERPL-SCNC: 140 MMOL/L (ref 136–145)
SODIUM SERPL-SCNC: 141 MMOL/L (ref 135–147)
SP GR UR STRIP: 1.01 (ref 1–1.03)
SP GR UR STRIP: 1.02 (ref 1–1.03)
SP GR UR STRIP: 1.03 (ref 1–1.03)
SP GR UR STRIP: >1.03 (ref 1–1.03)
SP GR UR: 1.01
SP GR UR: 1.01 (ref 1–1.03)
SP GR UR: 1.01 (ref 1–1.03)
SP GR UR: 1.02
SP GR UR: 1.02
SP GR UR: 1.03 (ref 1–1.03)
SQUAMOUS #/AREA URNS HPF: ABNORMAL /HPF
SQUAMOUS SPT QL MICRO: ABNORMAL /[HPF]
STRESS TARGET HR: 118 BPM
TETRACYCLINE SUSC ISLT: >=16
THC SERPLBLD CFM-MCNC: NEGATIVE NG/ML
TMP SMX SUSC ISLT: <=20
TOBRAMYCIN SUSC ISLT: <=1
TRIGL SERPL-MCNC: 229 MG/DL (ref 0–150)
TROPONIN T SERPL-MCNC: <0.01 NG/ML (ref 0–0.03)
TSH SERPL DL<=0.05 MIU/L-ACNC: 0.7 UIU/ML (ref 0.27–4.2)
TSH SERPL-ACNC: 1.98 M[IU]/L (ref 0.27–4.2)
UROBILINOGEN UR QL STRIP: ABNORMAL
VANCOMYCIN SERPL-MCNC: 16.7 MCG/ML (ref 5–40)
VANCOMYCIN SUSC ISLT: 1
VANCOMYCIN TROUGH SERPL-MCNC: 15.6 MCG/ML (ref 5–20)
VANCOMYCIN TROUGH SERPL-MCNC: 16.52 MCG/ML (ref 5–20)
VANCOMYCIN TROUGH SERPL-MCNC: 8.56 MCG/ML (ref 5–20)
VIT B1 BLD-SCNC: 137.3 NMOL/L (ref 66.5–200)
VIT B12 BLD-MCNC: 1016 PG/ML (ref 211–946)
VIT B12 BLD-MCNC: 1996 PG/ML (ref 211–946)
VIT B12 SERPL-MCNC: 1470 PG/ML (ref 211–911)
VLDLC SERPL-MCNC: 46 MG/DL (ref 5–40)
WBC # BLD AUTO: 10.24 10*3/UL (ref 4.8–10.8)
WBC # BLD AUTO: 10.28 10*3/MM3 (ref 3.4–10.8)
WBC # BLD AUTO: 10.44 10*3/MM3 (ref 3.4–10.8)
WBC # BLD AUTO: 10.51 10*3/MM3 (ref 3.4–10.8)
WBC # BLD AUTO: 10.66 10*3/MM3 (ref 3.4–10.8)
WBC # BLD AUTO: 10.74 10*3/MM3 (ref 3.4–10.8)
WBC # BLD AUTO: 10.82 10*3/MM3 (ref 3.4–10.8)
WBC # BLD AUTO: 11.04 10*3/MM3 (ref 3.4–10.8)
WBC # BLD AUTO: 11.27 10*3/MM3 (ref 3.4–10.8)
WBC # BLD AUTO: 11.64 10*3/MM3 (ref 3.4–10.8)
WBC # BLD AUTO: 11.79 10*3/MM3 (ref 3.4–10.8)
WBC # BLD AUTO: 11.81 10*3/MM3 (ref 3.4–10.8)
WBC # BLD AUTO: 12.52 10*3/MM3 (ref 3.4–10.8)
WBC # BLD AUTO: 12.57 10*3/MM3 (ref 3.4–10.8)
WBC # BLD AUTO: 12.6 10*3/MM3 (ref 3.4–10.8)
WBC # BLD AUTO: 12.82 10*3/MM3 (ref 3.4–10.8)
WBC # BLD AUTO: 13.32 10*3/MM3 (ref 3.4–10.8)
WBC # BLD AUTO: 13.58 10*3/MM3 (ref 3.4–10.8)
WBC # BLD AUTO: 13.9 10*3/MM3 (ref 3.4–10.8)
WBC # BLD AUTO: 14.67 10*3/MM3 (ref 3.4–10.8)
WBC # BLD AUTO: 14.86 10*3/MM3 (ref 3.4–10.8)
WBC # BLD AUTO: 15.29 10*3/MM3 (ref 3.4–10.8)
WBC # BLD AUTO: 15.64 10*3/MM3 (ref 3.4–10.8)
WBC # BLD AUTO: 17.62 10*3/MM3 (ref 3.4–10.8)
WBC # BLD AUTO: 18.28 10*3/MM3 (ref 3.4–10.8)
WBC # BLD AUTO: 8.15 10*3/MM3 (ref 3.4–10.8)
WBC # BLD AUTO: 8.48 10*3/MM3 (ref 3.4–10.8)
WBC # BLD AUTO: 8.81 10*3/MM3 (ref 3.4–10.8)
WBC # BLD AUTO: 9.67 10*3/MM3 (ref 3.4–10.8)
WBC # BLD AUTO: 9.79 10*3/MM3 (ref 3.4–10.8)
WBC #/AREA URNS HPF: ABNORMAL /[HPF]
WBC UR QL AUTO: ABNORMAL /HPF
WHOLE BLOOD HOLD SPECIMEN: NORMAL
YEAST URNS QL MICRO: ABNORMAL /HPF

## 2021-01-01 PROCEDURE — 85610 PROTHROMBIN TIME: CPT | Performed by: PHYSICIAN ASSISTANT

## 2021-01-01 PROCEDURE — 25010000002 CEFEPIME PER 500 MG: Performed by: EMERGENCY MEDICINE

## 2021-01-01 PROCEDURE — 99309 SBSQ NF CARE MODERATE MDM 30: CPT | Performed by: PHYSICIAN ASSISTANT

## 2021-01-01 PROCEDURE — 25010000002 MAGNESIUM SULFATE IN D5W 1G/100ML (PREMIX) 1-5 GM/100ML-% SOLUTION: Performed by: PHYSICIAN ASSISTANT

## 2021-01-01 PROCEDURE — 84145 PROCALCITONIN (PCT): CPT | Performed by: PHYSICIAN ASSISTANT

## 2021-01-01 PROCEDURE — 83735 ASSAY OF MAGNESIUM: CPT

## 2021-01-01 PROCEDURE — 80048 BASIC METABOLIC PNL TOTAL CA: CPT | Performed by: HOSPITALIST

## 2021-01-01 PROCEDURE — 92526 ORAL FUNCTION THERAPY: CPT

## 2021-01-01 PROCEDURE — 99239 HOSP IP/OBS DSCHRG MGMT >30: CPT | Performed by: INTERNAL MEDICINE

## 2021-01-01 PROCEDURE — 71045 X-RAY EXAM CHEST 1 VIEW: CPT | Performed by: RADIOLOGY

## 2021-01-01 PROCEDURE — 74177 CT ABD & PELVIS W/CONTRAST: CPT

## 2021-01-01 PROCEDURE — 97161 PT EVAL LOW COMPLEX 20 MIN: CPT

## 2021-01-01 PROCEDURE — 83605 ASSAY OF LACTIC ACID: CPT | Performed by: EMERGENCY MEDICINE

## 2021-01-01 PROCEDURE — 84100 ASSAY OF PHOSPHORUS: CPT | Performed by: PHYSICIAN ASSISTANT

## 2021-01-01 PROCEDURE — 94799 UNLISTED PULMONARY SVC/PX: CPT

## 2021-01-01 PROCEDURE — 97110 THERAPEUTIC EXERCISES: CPT

## 2021-01-01 PROCEDURE — 99233 SBSQ HOSP IP/OBS HIGH 50: CPT | Performed by: STUDENT IN AN ORGANIZED HEALTH CARE EDUCATION/TRAINING PROGRAM

## 2021-01-01 PROCEDURE — 97165 OT EVAL LOW COMPLEX 30 MIN: CPT

## 2021-01-01 PROCEDURE — 97530 THERAPEUTIC ACTIVITIES: CPT

## 2021-01-01 PROCEDURE — 83735 ASSAY OF MAGNESIUM: CPT | Performed by: EMERGENCY MEDICINE

## 2021-01-01 PROCEDURE — 25010000002 MEROPENEM PER 100 MG: Performed by: PHYSICIAN ASSISTANT

## 2021-01-01 PROCEDURE — 80069 RENAL FUNCTION PANEL: CPT | Performed by: PHYSICIAN ASSISTANT

## 2021-01-01 PROCEDURE — 97116 GAIT TRAINING THERAPY: CPT

## 2021-01-01 PROCEDURE — 85025 COMPLETE CBC W/AUTO DIFF WBC: CPT | Performed by: PHYSICIAN ASSISTANT

## 2021-01-01 PROCEDURE — 97535 SELF CARE MNGMENT TRAINING: CPT

## 2021-01-01 PROCEDURE — 85610 PROTHROMBIN TIME: CPT

## 2021-01-01 PROCEDURE — 25010000002 VANCOMYCIN 5 G RECONSTITUTED SOLUTION: Performed by: PHYSICIAN ASSISTANT

## 2021-01-01 PROCEDURE — 99285 EMERGENCY DEPT VISIT HI MDM: CPT

## 2021-01-01 PROCEDURE — 85025 COMPLETE CBC W/AUTO DIFF WBC: CPT

## 2021-01-01 PROCEDURE — 87641 MR-STAPH DNA AMP PROBE: CPT | Performed by: INTERNAL MEDICINE

## 2021-01-01 PROCEDURE — 87086 URINE CULTURE/COLONY COUNT: CPT | Performed by: INTERNAL MEDICINE

## 2021-01-01 PROCEDURE — 85025 COMPLETE CBC W/AUTO DIFF WBC: CPT | Performed by: STUDENT IN AN ORGANIZED HEALTH CARE EDUCATION/TRAINING PROGRAM

## 2021-01-01 PROCEDURE — 85610 PROTHROMBIN TIME: CPT | Performed by: HOSPITALIST

## 2021-01-01 PROCEDURE — 83880 ASSAY OF NATRIURETIC PEPTIDE: CPT | Performed by: INTERNAL MEDICINE

## 2021-01-01 PROCEDURE — 85027 COMPLETE CBC AUTOMATED: CPT | Performed by: PHYSICIAN ASSISTANT

## 2021-01-01 PROCEDURE — 83735 ASSAY OF MAGNESIUM: CPT | Performed by: PHYSICIAN ASSISTANT

## 2021-01-01 PROCEDURE — 87040 BLOOD CULTURE FOR BACTERIA: CPT | Performed by: EMERGENCY MEDICINE

## 2021-01-01 PROCEDURE — 93793 ANTICOAG MGMT PT WARFARIN: CPT | Performed by: INTERNAL MEDICINE

## 2021-01-01 PROCEDURE — 71250 CT THORAX DX C-: CPT

## 2021-01-01 PROCEDURE — 71045 X-RAY EXAM CHEST 1 VIEW: CPT

## 2021-01-01 PROCEDURE — 93306 TTE W/DOPPLER COMPLETE: CPT

## 2021-01-01 PROCEDURE — 25010000002 VANCOMYCIN 5 G RECONSTITUTED SOLUTION: Performed by: HOSPITALIST

## 2021-01-01 PROCEDURE — 25010000002 MAGNESIUM SULFATE IN D5W 1G/100ML (PREMIX) 1-5 GM/100ML-% SOLUTION: Performed by: HOSPITALIST

## 2021-01-01 PROCEDURE — 83735 ASSAY OF MAGNESIUM: CPT | Performed by: FAMILY MEDICINE

## 2021-01-01 PROCEDURE — 25010000002 CEFEPIME PER 500 MG: Performed by: INTERNAL MEDICINE

## 2021-01-01 PROCEDURE — 99305 1ST NF CARE MODERATE MDM 35: CPT | Performed by: PHYSICIAN ASSISTANT

## 2021-01-01 PROCEDURE — 71046 X-RAY EXAM CHEST 2 VIEWS: CPT

## 2021-01-01 PROCEDURE — 99284 EMERGENCY DEPT VISIT MOD MDM: CPT

## 2021-01-01 PROCEDURE — 84484 ASSAY OF TROPONIN QUANT: CPT | Performed by: PHYSICIAN ASSISTANT

## 2021-01-01 PROCEDURE — 25010000002 CEFTRIAXONE PER 250 MG: Performed by: EMERGENCY MEDICINE

## 2021-01-01 PROCEDURE — 74177 CT ABD & PELVIS W/CONTRAST: CPT | Performed by: RADIOLOGY

## 2021-01-01 PROCEDURE — 25010000002 VANCOMYCIN 5 G RECONSTITUTED SOLUTION: Performed by: INTERNAL MEDICINE

## 2021-01-01 PROCEDURE — U0003 INFECTIOUS AGENT DETECTION BY NUCLEIC ACID (DNA OR RNA); SEVERE ACUTE RESPIRATORY SYNDROME CORONAVIRUS 2 (SARS-COV-2) (CORONAVIRUS DISEASE [COVID-19]), AMPLIFIED PROBE TECHNIQUE, MAKING USE OF HIGH THROUGHPUT TECHNOLOGIES AS DESCRIBED BY CMS-2020-01-R: HCPCS | Performed by: EMERGENCY MEDICINE

## 2021-01-01 PROCEDURE — 87077 CULTURE AEROBIC IDENTIFY: CPT | Performed by: PHYSICIAN ASSISTANT

## 2021-01-01 PROCEDURE — 84484 ASSAY OF TROPONIN QUANT: CPT | Performed by: EMERGENCY MEDICINE

## 2021-01-01 PROCEDURE — 36415 COLL VENOUS BLD VENIPUNCTURE: CPT

## 2021-01-01 PROCEDURE — 84145 PROCALCITONIN (PCT): CPT | Performed by: GENERAL PRACTICE

## 2021-01-01 PROCEDURE — 99214 OFFICE O/P EST MOD 30 MIN: CPT | Performed by: INTERNAL MEDICINE

## 2021-01-01 PROCEDURE — 80048 BASIC METABOLIC PNL TOTAL CA: CPT | Performed by: PHYSICIAN ASSISTANT

## 2021-01-01 PROCEDURE — 80048 BASIC METABOLIC PNL TOTAL CA: CPT | Performed by: FAMILY MEDICINE

## 2021-01-01 PROCEDURE — 99232 SBSQ HOSP IP/OBS MODERATE 35: CPT | Performed by: INTERNAL MEDICINE

## 2021-01-01 PROCEDURE — 99291 CRITICAL CARE FIRST HOUR: CPT | Performed by: INTERNAL MEDICINE

## 2021-01-01 PROCEDURE — 85025 COMPLETE CBC W/AUTO DIFF WBC: CPT | Performed by: INTERNAL MEDICINE

## 2021-01-01 PROCEDURE — 0 IOPAMIDOL PER 1 ML: Performed by: INTERNAL MEDICINE

## 2021-01-01 PROCEDURE — 99308 SBSQ NF CARE LOW MDM 20: CPT | Performed by: PHYSICIAN ASSISTANT

## 2021-01-01 PROCEDURE — 80048 BASIC METABOLIC PNL TOTAL CA: CPT | Performed by: INTERNAL MEDICINE

## 2021-01-01 PROCEDURE — 87899 AGENT NOS ASSAY W/OPTIC: CPT | Performed by: INTERNAL MEDICINE

## 2021-01-01 PROCEDURE — 81003 URINALYSIS AUTO W/O SCOPE: CPT | Performed by: EMERGENCY MEDICINE

## 2021-01-01 PROCEDURE — 83880 ASSAY OF NATRIURETIC PEPTIDE: CPT | Performed by: PHYSICIAN ASSISTANT

## 2021-01-01 PROCEDURE — 83605 ASSAY OF LACTIC ACID: CPT | Performed by: GENERAL PRACTICE

## 2021-01-01 PROCEDURE — 80053 COMPREHEN METABOLIC PANEL: CPT | Performed by: INTERNAL MEDICINE

## 2021-01-01 PROCEDURE — 87040 BLOOD CULTURE FOR BACTERIA: CPT | Performed by: PHYSICIAN ASSISTANT

## 2021-01-01 PROCEDURE — 99213 OFFICE O/P EST LOW 20 MIN: CPT | Performed by: INTERNAL MEDICINE

## 2021-01-01 PROCEDURE — 81001 URINALYSIS AUTO W/SCOPE: CPT | Performed by: EMERGENCY MEDICINE

## 2021-01-01 PROCEDURE — 25010000002 CEFEPIME PER 500 MG: Performed by: STUDENT IN AN ORGANIZED HEALTH CARE EDUCATION/TRAINING PROGRAM

## 2021-01-01 PROCEDURE — 84100 ASSAY OF PHOSPHORUS: CPT | Performed by: FAMILY MEDICINE

## 2021-01-01 PROCEDURE — 82746 ASSAY OF FOLIC ACID SERUM: CPT | Performed by: HOSPITALIST

## 2021-01-01 PROCEDURE — 82962 GLUCOSE BLOOD TEST: CPT

## 2021-01-01 PROCEDURE — 87186 SC STD MICRODIL/AGAR DIL: CPT | Performed by: PHYSICIAN ASSISTANT

## 2021-01-01 PROCEDURE — 81003 URINALYSIS AUTO W/O SCOPE: CPT | Performed by: PHYSICIAN ASSISTANT

## 2021-01-01 PROCEDURE — 83735 ASSAY OF MAGNESIUM: CPT | Performed by: HOSPITALIST

## 2021-01-01 PROCEDURE — U0005 INFEC AGEN DETEC AMPLI PROBE: HCPCS | Performed by: INTERNAL MEDICINE

## 2021-01-01 PROCEDURE — 84100 ASSAY OF PHOSPHORUS: CPT | Performed by: INTERNAL MEDICINE

## 2021-01-01 PROCEDURE — 92610 EVALUATE SWALLOWING FUNCTION: CPT

## 2021-01-01 PROCEDURE — 80202 ASSAY OF VANCOMYCIN: CPT | Performed by: INTERNAL MEDICINE

## 2021-01-01 PROCEDURE — 93005 ELECTROCARDIOGRAM TRACING: CPT

## 2021-01-01 PROCEDURE — 87899 AGENT NOS ASSAY W/OPTIC: CPT | Performed by: HOSPITALIST

## 2021-01-01 PROCEDURE — 93306 TTE W/DOPPLER COMPLETE: CPT | Performed by: INTERNAL MEDICINE

## 2021-01-01 PROCEDURE — 82746 ASSAY OF FOLIC ACID SERUM: CPT | Performed by: PHYSICIAN ASSISTANT

## 2021-01-01 PROCEDURE — 36415 COLL VENOUS BLD VENIPUNCTURE: CPT | Performed by: HOSPITALIST

## 2021-01-01 PROCEDURE — 0 IOPAMIDOL PER 1 ML: Performed by: EMERGENCY MEDICINE

## 2021-01-01 PROCEDURE — 84443 ASSAY THYROID STIM HORMONE: CPT | Performed by: HOSPITALIST

## 2021-01-01 PROCEDURE — 97166 OT EVAL MOD COMPLEX 45 MIN: CPT

## 2021-01-01 PROCEDURE — 85384 FIBRINOGEN ACTIVITY: CPT | Performed by: INTERNAL MEDICINE

## 2021-01-01 PROCEDURE — 80061 LIPID PANEL: CPT | Performed by: INTERNAL MEDICINE

## 2021-01-01 PROCEDURE — 99233 SBSQ HOSP IP/OBS HIGH 50: CPT | Performed by: INTERNAL MEDICINE

## 2021-01-01 PROCEDURE — 25010000002 MAGNESIUM SULFATE IN D5W 1G/100ML (PREMIX) 1-5 GM/100ML-% SOLUTION: Performed by: INTERNAL MEDICINE

## 2021-01-01 PROCEDURE — 82607 VITAMIN B-12: CPT | Performed by: PHYSICIAN ASSISTANT

## 2021-01-01 PROCEDURE — 93005 ELECTROCARDIOGRAM TRACING: CPT | Performed by: PHYSICIAN ASSISTANT

## 2021-01-01 PROCEDURE — 85610 PROTHROMBIN TIME: CPT | Performed by: INTERNAL MEDICINE

## 2021-01-01 PROCEDURE — 83735 ASSAY OF MAGNESIUM: CPT | Performed by: INTERNAL MEDICINE

## 2021-01-01 PROCEDURE — 80069 RENAL FUNCTION PANEL: CPT | Performed by: INTERNAL MEDICINE

## 2021-01-01 PROCEDURE — 85610 PROTHROMBIN TIME: CPT | Performed by: EMERGENCY MEDICINE

## 2021-01-01 PROCEDURE — 99310 SBSQ NF CARE HIGH MDM 45: CPT | Performed by: PHYSICIAN ASSISTANT

## 2021-01-01 PROCEDURE — 81003 URINALYSIS AUTO W/O SCOPE: CPT | Performed by: INTERNAL MEDICINE

## 2021-01-01 PROCEDURE — 72125 CT NECK SPINE W/O DYE: CPT

## 2021-01-01 PROCEDURE — 83605 ASSAY OF LACTIC ACID: CPT | Performed by: PHYSICIAN ASSISTANT

## 2021-01-01 PROCEDURE — 80202 ASSAY OF VANCOMYCIN: CPT | Performed by: PHYSICIAN ASSISTANT

## 2021-01-01 PROCEDURE — 93010 ELECTROCARDIOGRAM REPORT: CPT | Performed by: INTERNAL MEDICINE

## 2021-01-01 PROCEDURE — 85025 COMPLETE CBC W/AUTO DIFF WBC: CPT | Performed by: HOSPITALIST

## 2021-01-01 PROCEDURE — 84145 PROCALCITONIN (PCT): CPT | Performed by: INTERNAL MEDICINE

## 2021-01-01 PROCEDURE — 80053 COMPREHEN METABOLIC PANEL: CPT | Performed by: EMERGENCY MEDICINE

## 2021-01-01 PROCEDURE — 82607 VITAMIN B-12: CPT | Performed by: HOSPITALIST

## 2021-01-01 PROCEDURE — 94640 AIRWAY INHALATION TREATMENT: CPT

## 2021-01-01 PROCEDURE — 87086 URINE CULTURE/COLONY COUNT: CPT | Performed by: PHYSICIAN ASSISTANT

## 2021-01-01 PROCEDURE — 80053 COMPREHEN METABOLIC PANEL: CPT

## 2021-01-01 PROCEDURE — 93005 ELECTROCARDIOGRAM TRACING: CPT | Performed by: EMERGENCY MEDICINE

## 2021-01-01 PROCEDURE — U0003 INFECTIOUS AGENT DETECTION BY NUCLEIC ACID (DNA OR RNA); SEVERE ACUTE RESPIRATORY SYNDROME CORONAVIRUS 2 (SARS-COV-2) (CORONAVIRUS DISEASE [COVID-19]), AMPLIFIED PROBE TECHNIQUE, MAKING USE OF HIGH THROUGHPUT TECHNOLOGIES AS DESCRIBED BY CMS-2020-01-R: HCPCS | Performed by: INTERNAL MEDICINE

## 2021-01-01 PROCEDURE — 84484 ASSAY OF TROPONIN QUANT: CPT

## 2021-01-01 PROCEDURE — 25010000002 CEFTRIAXONE PER 250 MG: Performed by: PHYSICIAN ASSISTANT

## 2021-01-01 PROCEDURE — 70450 CT HEAD/BRAIN W/O DYE: CPT

## 2021-01-01 PROCEDURE — 82565 ASSAY OF CREATININE: CPT

## 2021-01-01 PROCEDURE — 80202 ASSAY OF VANCOMYCIN: CPT | Performed by: HOSPITALIST

## 2021-01-01 PROCEDURE — U0005 INFEC AGEN DETEC AMPLI PROBE: HCPCS | Performed by: PHYSICIAN ASSISTANT

## 2021-01-01 PROCEDURE — 99309 SBSQ NF CARE MODERATE MDM 30: CPT | Performed by: INTERNAL MEDICINE

## 2021-01-01 PROCEDURE — 99223 1ST HOSP IP/OBS HIGH 75: CPT | Performed by: INTERNAL MEDICINE

## 2021-01-01 PROCEDURE — 81001 URINALYSIS AUTO W/SCOPE: CPT | Performed by: PHYSICIAN ASSISTANT

## 2021-01-01 PROCEDURE — U0003 INFECTIOUS AGENT DETECTION BY NUCLEIC ACID (DNA OR RNA); SEVERE ACUTE RESPIRATORY SYNDROME CORONAVIRUS 2 (SARS-COV-2) (CORONAVIRUS DISEASE [COVID-19]), AMPLIFIED PROBE TECHNIQUE, MAKING USE OF HIGH THROUGHPUT TECHNOLOGIES AS DESCRIBED BY CMS-2020-01-R: HCPCS | Performed by: PHYSICIAN ASSISTANT

## 2021-01-01 PROCEDURE — 84425 ASSAY OF VITAMIN B-1: CPT | Performed by: HOSPITALIST

## 2021-01-01 PROCEDURE — 85025 COMPLETE CBC W/AUTO DIFF WBC: CPT | Performed by: FAMILY MEDICINE

## 2021-01-01 PROCEDURE — 82652 VIT D 1 25-DIHYDROXY: CPT

## 2021-01-01 PROCEDURE — 36415 COLL VENOUS BLD VENIPUNCTURE: CPT | Performed by: INTERNAL MEDICINE

## 2021-01-01 PROCEDURE — 25010000002 CEFEPIME PER 500 MG: Performed by: HOSPITALIST

## 2021-01-01 PROCEDURE — 93005 ELECTROCARDIOGRAM TRACING: CPT | Performed by: INTERNAL MEDICINE

## 2021-01-01 PROCEDURE — U0005 INFEC AGEN DETEC AMPLI PROBE: HCPCS | Performed by: EMERGENCY MEDICINE

## 2021-01-01 PROCEDURE — 25010000002 ONDANSETRON PER 1 MG: Performed by: EMERGENCY MEDICINE

## 2021-01-01 PROCEDURE — 80053 COMPREHEN METABOLIC PANEL: CPT | Performed by: PHYSICIAN ASSISTANT

## 2021-01-01 PROCEDURE — 87804 INFLUENZA ASSAY W/OPTIC: CPT | Performed by: EMERGENCY MEDICINE

## 2021-01-01 PROCEDURE — 51702 INSERT TEMP BLADDER CATH: CPT

## 2021-01-01 PROCEDURE — 86738 MYCOPLASMA ANTIBODY: CPT | Performed by: INTERNAL MEDICINE

## 2021-01-01 PROCEDURE — 71260 CT THORAX DX C+: CPT

## 2021-01-01 PROCEDURE — 80076 HEPATIC FUNCTION PANEL: CPT | Performed by: PHYSICIAN ASSISTANT

## 2021-01-01 PROCEDURE — 99291 CRITICAL CARE FIRST HOUR: CPT | Performed by: HOSPITALIST

## 2021-01-01 RX ORDER — POLYETHYLENE GLYCOL 3350 17 G/17G
17 POWDER, FOR SOLUTION ORAL DAILY PRN
Status: DISCONTINUED | OUTPATIENT
Start: 2021-01-01 | End: 2021-01-01

## 2021-01-01 RX ORDER — SODIUM CHLORIDE 0.9 % (FLUSH) 0.9 %
10 SYRINGE (ML) INJECTION EVERY 12 HOURS SCHEDULED
Status: DISCONTINUED | OUTPATIENT
Start: 2021-01-01 | End: 2021-01-01 | Stop reason: HOSPADM

## 2021-01-01 RX ORDER — FERROUS SULFATE 325(65) MG
325 TABLET ORAL
COMMUNITY
Start: 2021-01-01 | End: 2021-01-01

## 2021-01-01 RX ORDER — PHENOL 1.4 %
1200 AEROSOL, SPRAY (ML) MUCOUS MEMBRANE DAILY
COMMUNITY
End: 2021-01-01

## 2021-01-01 RX ORDER — CLOPIDOGREL BISULFATE 75 MG/1
75 TABLET ORAL EVERY EVENING
Status: DISCONTINUED | OUTPATIENT
Start: 2021-01-01 | End: 2021-01-01 | Stop reason: HOSPADM

## 2021-01-01 RX ORDER — FERROUS SULFATE 325(65) MG
325 TABLET ORAL
COMMUNITY
End: 2021-01-01

## 2021-01-01 RX ORDER — BISACODYL 10 MG
10 SUPPOSITORY, RECTAL RECTAL DAILY PRN
Status: DISCONTINUED | OUTPATIENT
Start: 2021-01-01 | End: 2021-01-01

## 2021-01-01 RX ORDER — HYDROCODONE BITARTRATE AND ACETAMINOPHEN 7.5; 325 MG/1; MG/1
2 TABLET ORAL EVERY 4 HOURS PRN
Status: DISCONTINUED | OUTPATIENT
Start: 2021-01-01 | End: 2021-01-01

## 2021-01-01 RX ORDER — POTASSIUM CHLORIDE 750 MG/1
20 CAPSULE, EXTENDED RELEASE ORAL ONCE
Status: COMPLETED | OUTPATIENT
Start: 2021-01-01 | End: 2021-01-01

## 2021-01-01 RX ORDER — FUROSEMIDE 40 MG/1
40 TABLET ORAL DAILY
Status: DISCONTINUED | OUTPATIENT
Start: 2021-01-01 | End: 2021-01-01

## 2021-01-01 RX ORDER — POLYETHYLENE GLYCOL 3350 17 G/17G
17 POWDER, FOR SOLUTION ORAL DAILY PRN
Status: DISCONTINUED | OUTPATIENT
Start: 2021-01-01 | End: 2021-01-01 | Stop reason: HOSPADM

## 2021-01-01 RX ORDER — CHOLECALCIFEROL (VITAMIN D3) 125 MCG
5 CAPSULE ORAL NIGHTLY
Qty: 30 TABLET | Status: ON HOLD
Start: 2021-01-01 | End: 2021-01-01

## 2021-01-01 RX ORDER — LANOLIN ALCOHOL/MO/W.PET/CERES
1000 CREAM (GRAM) TOPICAL DAILY
COMMUNITY
End: 2021-01-01

## 2021-01-01 RX ORDER — ACETAMINOPHEN 325 MG/1
650 TABLET ORAL EVERY 6 HOURS PRN
Status: DISCONTINUED | OUTPATIENT
Start: 2021-01-01 | End: 2021-01-01

## 2021-01-01 RX ORDER — ACETAMINOPHEN 650 MG/1
650 SUPPOSITORY RECTAL EVERY 4 HOURS PRN
Status: DISCONTINUED | OUTPATIENT
Start: 2021-01-01 | End: 2021-01-01 | Stop reason: HOSPADM

## 2021-01-01 RX ORDER — L.ACID,PARA/B.BIFIDUM/S.THERM 8B CELL
1 CAPSULE ORAL DAILY
Status: DISCONTINUED | OUTPATIENT
Start: 2021-01-01 | End: 2021-01-01 | Stop reason: HOSPADM

## 2021-01-01 RX ORDER — BISACODYL 10 MG
10 SUPPOSITORY, RECTAL RECTAL DAILY PRN
Status: DISCONTINUED | OUTPATIENT
Start: 2021-01-01 | End: 2021-01-01 | Stop reason: HOSPADM

## 2021-01-01 RX ORDER — POTASSIUM CHLORIDE 750 MG/1
40 CAPSULE, EXTENDED RELEASE ORAL ONCE
Status: COMPLETED | OUTPATIENT
Start: 2021-01-01 | End: 2021-01-01

## 2021-01-01 RX ORDER — WARFARIN SODIUM 1 MG/1
1 TABLET ORAL
Status: DISCONTINUED | OUTPATIENT
Start: 2021-01-01 | End: 2021-01-01 | Stop reason: HOSPADM

## 2021-01-01 RX ORDER — TRAZODONE HYDROCHLORIDE 50 MG/1
50 TABLET ORAL NIGHTLY PRN
Status: DISCONTINUED | OUTPATIENT
Start: 2021-01-01 | End: 2021-01-01 | Stop reason: HOSPADM

## 2021-01-01 RX ORDER — BISACODYL 5 MG/1
5 TABLET, DELAYED RELEASE ORAL DAILY PRN
Status: DISCONTINUED | OUTPATIENT
Start: 2021-01-01 | End: 2021-01-01 | Stop reason: HOSPADM

## 2021-01-01 RX ORDER — SENNOSIDES 8.6 MG
650 CAPSULE ORAL EVERY 8 HOURS PRN
Status: DISCONTINUED | OUTPATIENT
Start: 2021-01-01 | End: 2021-01-01 | Stop reason: HOSPADM

## 2021-01-01 RX ORDER — LEVOTHYROXINE SODIUM 0.03 MG/1
25 TABLET ORAL
Status: DISCONTINUED | OUTPATIENT
Start: 2021-01-01 | End: 2021-01-01 | Stop reason: HOSPADM

## 2021-01-01 RX ORDER — WARFARIN SODIUM 2 MG/1
TABLET ORAL
Qty: 90 TABLET | Refills: 0 | Status: SHIPPED | OUTPATIENT
Start: 2021-01-01 | End: 2021-01-01

## 2021-01-01 RX ORDER — MIRTAZAPINE 15 MG/1
30 TABLET, FILM COATED ORAL EVERY 24 HOURS
Status: DISCONTINUED | OUTPATIENT
Start: 2021-01-01 | End: 2021-01-01 | Stop reason: HOSPADM

## 2021-01-01 RX ORDER — PANTOPRAZOLE SODIUM 40 MG/1
40 TABLET, DELAYED RELEASE ORAL EVERY MORNING
Status: DISCONTINUED | OUTPATIENT
Start: 2021-01-01 | End: 2021-01-01 | Stop reason: HOSPADM

## 2021-01-01 RX ORDER — MIRTAZAPINE 15 MG/1
30 TABLET, FILM COATED ORAL NIGHTLY
Status: DISCONTINUED | OUTPATIENT
Start: 2021-01-01 | End: 2021-01-01

## 2021-01-01 RX ORDER — WARFARIN SODIUM 2 MG/1
2 TABLET ORAL
COMMUNITY
End: 2021-01-01

## 2021-01-01 RX ORDER — SWAB
800 SWAB, NON-MEDICATED MISCELLANEOUS DAILY
COMMUNITY
End: 2021-01-01

## 2021-01-01 RX ORDER — CALCIUM CARBONATE 200(500)MG
1 TABLET,CHEWABLE ORAL 3 TIMES DAILY PRN
Status: DISCONTINUED | OUTPATIENT
Start: 2021-01-01 | End: 2021-01-01 | Stop reason: HOSPADM

## 2021-01-01 RX ORDER — ONDANSETRON 2 MG/ML
4 INJECTION INTRAMUSCULAR; INTRAVENOUS EVERY 6 HOURS PRN
Status: DISCONTINUED | OUTPATIENT
Start: 2021-01-01 | End: 2021-01-01 | Stop reason: HOSPADM

## 2021-01-01 RX ORDER — MIRTAZAPINE 15 MG/1
30 TABLET, FILM COATED ORAL NIGHTLY
Status: DISCONTINUED | OUTPATIENT
Start: 2021-01-01 | End: 2021-01-01 | Stop reason: HOSPADM

## 2021-01-01 RX ORDER — ACETAMINOPHEN 325 MG/1
650 TABLET ORAL EVERY 4 HOURS PRN
Status: DISCONTINUED | OUTPATIENT
Start: 2021-01-01 | End: 2021-01-01

## 2021-01-01 RX ORDER — AMOXICILLIN 250 MG
2 CAPSULE ORAL EVERY 12 HOURS
Status: DISCONTINUED | OUTPATIENT
Start: 2021-01-01 | End: 2021-01-01 | Stop reason: HOSPADM

## 2021-01-01 RX ORDER — SENNOSIDES 8.6 MG
650 CAPSULE ORAL EVERY 8 HOURS PRN
COMMUNITY

## 2021-01-01 RX ORDER — WARFARIN SODIUM 2 MG/1
2 TABLET ORAL
Status: DISCONTINUED | OUTPATIENT
Start: 2021-01-01 | End: 2021-01-01 | Stop reason: HOSPADM

## 2021-01-01 RX ORDER — FUROSEMIDE 20 MG/1
20 TABLET ORAL ONCE
Status: COMPLETED | OUTPATIENT
Start: 2021-01-01 | End: 2021-01-01

## 2021-01-01 RX ORDER — GUAIFENESIN 600 MG/1
600 TABLET, EXTENDED RELEASE ORAL EVERY 12 HOURS SCHEDULED
Status: DISCONTINUED | OUTPATIENT
Start: 2021-01-01 | End: 2021-01-01 | Stop reason: HOSPADM

## 2021-01-01 RX ORDER — LOPERAMIDE HYDROCHLORIDE 2 MG/1
2 CAPSULE ORAL 4 TIMES DAILY PRN
COMMUNITY

## 2021-01-01 RX ORDER — BISACODYL 5 MG/1
5 TABLET, DELAYED RELEASE ORAL DAILY PRN
Status: DISCONTINUED | OUTPATIENT
Start: 2021-01-01 | End: 2021-01-01

## 2021-01-01 RX ORDER — ATORVASTATIN CALCIUM 10 MG/1
10 TABLET, FILM COATED ORAL NIGHTLY
Qty: 30 TABLET | Refills: 0 | Status: SHIPPED | OUTPATIENT
Start: 2021-01-01 | End: 2021-01-01

## 2021-01-01 RX ORDER — PHENOL 1.4 %
10 AEROSOL, SPRAY (ML) MUCOUS MEMBRANE NIGHTLY
COMMUNITY
End: 2021-01-01

## 2021-01-01 RX ORDER — LEVOTHYROXINE SODIUM 0.03 MG/1
25 TABLET ORAL EVERY EVENING
Status: DISCONTINUED | OUTPATIENT
Start: 2021-01-01 | End: 2021-01-01 | Stop reason: HOSPADM

## 2021-01-01 RX ORDER — LIDOCAINE 50 MG/G
1 PATCH TOPICAL
Status: DISCONTINUED | OUTPATIENT
Start: 2021-01-01 | End: 2021-01-01 | Stop reason: HOSPADM

## 2021-01-01 RX ORDER — ATORVASTATIN CALCIUM 10 MG/1
10 TABLET, FILM COATED ORAL NIGHTLY
Status: DISCONTINUED | OUTPATIENT
Start: 2021-01-01 | End: 2021-01-01 | Stop reason: HOSPADM

## 2021-01-01 RX ORDER — TRAZODONE HYDROCHLORIDE 50 MG/1
50 TABLET ORAL NIGHTLY
Status: DISCONTINUED | OUTPATIENT
Start: 2021-01-01 | End: 2021-01-01 | Stop reason: HOSPADM

## 2021-01-01 RX ORDER — AMOXICILLIN 250 MG
2 CAPSULE ORAL 2 TIMES DAILY
Status: DISCONTINUED | OUTPATIENT
Start: 2021-01-01 | End: 2021-01-01 | Stop reason: HOSPADM

## 2021-01-01 RX ORDER — AMOXICILLIN 250 MG
2 CAPSULE ORAL 2 TIMES DAILY
Status: DISCONTINUED | OUTPATIENT
Start: 2021-01-01 | End: 2021-01-01

## 2021-01-01 RX ORDER — CHOLECALCIFEROL (VITAMIN D3) 125 MCG
10 CAPSULE ORAL EVERY 24 HOURS
Status: DISCONTINUED | OUTPATIENT
Start: 2021-01-01 | End: 2021-01-01 | Stop reason: HOSPADM

## 2021-01-01 RX ORDER — SODIUM CHLORIDE 0.9 % (FLUSH) 0.9 %
10 SYRINGE (ML) INJECTION AS NEEDED
Status: DISCONTINUED | OUTPATIENT
Start: 2021-01-01 | End: 2021-01-01 | Stop reason: HOSPADM

## 2021-01-01 RX ORDER — CEFDINIR 300 MG/1
300 CAPSULE ORAL EVERY 12 HOURS SCHEDULED
Status: DISCONTINUED | OUTPATIENT
Start: 2021-01-01 | End: 2021-01-01 | Stop reason: HOSPADM

## 2021-01-01 RX ORDER — SODIUM CHLORIDE 9 MG/ML
50 INJECTION, SOLUTION INTRAVENOUS CONTINUOUS
Status: DISCONTINUED | OUTPATIENT
Start: 2021-01-01 | End: 2021-01-01

## 2021-01-01 RX ORDER — SIMETHICONE 125 MG
125 TABLET,CHEWABLE ORAL EVERY MORNING
COMMUNITY

## 2021-01-01 RX ORDER — RISPERIDONE 0.25 MG/1
0.5 TABLET ORAL NIGHTLY PRN
Status: DISCONTINUED | OUTPATIENT
Start: 2021-01-01 | End: 2021-01-01 | Stop reason: HOSPADM

## 2021-01-01 RX ORDER — AMOXICILLIN 250 MG
2 CAPSULE ORAL 2 TIMES DAILY PRN
Status: DISCONTINUED | OUTPATIENT
Start: 2021-01-01 | End: 2021-01-01 | Stop reason: HOSPADM

## 2021-01-01 RX ORDER — OMEPRAZOLE 20 MG/1
20 CAPSULE, DELAYED RELEASE ORAL DAILY
COMMUNITY

## 2021-01-01 RX ORDER — DOXYCYCLINE 100 MG/1
100 CAPSULE ORAL EVERY 12 HOURS SCHEDULED
Status: COMPLETED | OUTPATIENT
Start: 2021-01-01 | End: 2021-01-01

## 2021-01-01 RX ORDER — NITROFURANTOIN 25; 75 MG/1; MG/1
100 CAPSULE ORAL DAILY
Qty: 90 CAPSULE | Refills: 0 | Status: SHIPPED | OUTPATIENT
Start: 2021-01-01 | End: 2021-01-01 | Stop reason: HOSPADM

## 2021-01-01 RX ORDER — IPRATROPIUM BROMIDE AND ALBUTEROL SULFATE 2.5; .5 MG/3ML; MG/3ML
3 SOLUTION RESPIRATORY (INHALATION)
Status: DISCONTINUED | OUTPATIENT
Start: 2021-01-01 | End: 2021-01-01

## 2021-01-01 RX ORDER — OMEPRAZOLE 20 MG/1
20 CAPSULE, DELAYED RELEASE ORAL DAILY
COMMUNITY
End: 2021-01-01

## 2021-01-01 RX ORDER — SULFAMETHOXAZOLE AND TRIMETHOPRIM 800; 160 MG/1; MG/1
1 TABLET ORAL 2 TIMES DAILY
Qty: 20 TABLET | Refills: 0 | Status: SHIPPED | OUTPATIENT
Start: 2021-01-01 | End: 2021-01-01 | Stop reason: HOSPADM

## 2021-01-01 RX ORDER — OMEGA-3S/DHA/EPA/FISH OIL/D3 300MG-1000
800 CAPSULE ORAL DAILY
Status: DISCONTINUED | OUTPATIENT
Start: 2021-01-01 | End: 2021-01-01 | Stop reason: HOSPADM

## 2021-01-01 RX ORDER — POTASSIUM CHLORIDE 750 MG/1
20 CAPSULE, EXTENDED RELEASE ORAL 2 TIMES DAILY WITH MEALS
Status: COMPLETED | OUTPATIENT
Start: 2021-01-01 | End: 2021-01-01

## 2021-01-01 RX ORDER — CEFDINIR 300 MG/1
300 CAPSULE ORAL EVERY 12 HOURS SCHEDULED
Qty: 3 CAPSULE | Refills: 0 | Status: SHIPPED | OUTPATIENT
Start: 2021-01-01 | End: 2021-01-01

## 2021-01-01 RX ORDER — POTASSIUM CHLORIDE 750 MG/1
20 CAPSULE, EXTENDED RELEASE ORAL 2 TIMES DAILY WITH MEALS
Status: DISCONTINUED | OUTPATIENT
Start: 2021-01-01 | End: 2021-01-01

## 2021-01-01 RX ORDER — CLOPIDOGREL BISULFATE 75 MG/1
TABLET ORAL
Qty: 90 TABLET | Refills: 1 | Status: SHIPPED | OUTPATIENT
Start: 2021-01-01

## 2021-01-01 RX ORDER — SODIUM CHLORIDE, SODIUM LACTATE, POTASSIUM CHLORIDE, CALCIUM CHLORIDE 600; 310; 30; 20 MG/100ML; MG/100ML; MG/100ML; MG/100ML
150 INJECTION, SOLUTION INTRAVENOUS CONTINUOUS
Status: DISCONTINUED | OUTPATIENT
Start: 2021-01-01 | End: 2021-01-01

## 2021-01-01 RX ORDER — NOREPINEPHRINE BIT/0.9 % NACL 8 MG/250ML
.02-.3 INFUSION BOTTLE (ML) INTRAVENOUS
Status: DISCONTINUED | OUTPATIENT
Start: 2021-01-01 | End: 2021-01-01

## 2021-01-01 RX ORDER — ACETAMINOPHEN 160 MG/5ML
650 SOLUTION ORAL EVERY 4 HOURS PRN
Status: DISCONTINUED | OUTPATIENT
Start: 2021-01-01 | End: 2021-01-01 | Stop reason: HOSPADM

## 2021-01-01 RX ORDER — SODIUM CHLORIDE 9 MG/ML
INJECTION, SOLUTION INTRAVENOUS
Status: DISPENSED
Start: 2021-01-01 | End: 2021-01-01

## 2021-01-01 RX ORDER — LISINOPRIL 20 MG/1
20 TABLET ORAL DAILY
COMMUNITY
End: 2021-01-01 | Stop reason: HOSPADM

## 2021-01-01 RX ORDER — CLOPIDOGREL BISULFATE 75 MG/1
75 TABLET ORAL DAILY
Status: DISCONTINUED | OUTPATIENT
Start: 2021-01-01 | End: 2021-01-01 | Stop reason: HOSPADM

## 2021-01-01 RX ORDER — ACETAMINOPHEN 325 MG/1
650 TABLET ORAL EVERY 4 HOURS PRN
Status: DISCONTINUED | OUTPATIENT
Start: 2021-01-01 | End: 2021-01-01 | Stop reason: SDUPTHER

## 2021-01-01 RX ORDER — BACILLUS COAGULANS/INULIN 1B-250 MG
1 CAPSULE ORAL EVERY MORNING
COMMUNITY
End: 2021-01-01

## 2021-01-01 RX ORDER — WARFARIN SODIUM 2 MG/1
2 TABLET ORAL
Status: COMPLETED | OUTPATIENT
Start: 2021-01-01 | End: 2021-01-01

## 2021-01-01 RX ORDER — LEVOTHYROXINE SODIUM 0.03 MG/1
25 TABLET ORAL EVERY EVENING
COMMUNITY

## 2021-01-01 RX ORDER — ACETAMINOPHEN 325 MG/1
650 TABLET ORAL EVERY 4 HOURS PRN
Status: DISCONTINUED | OUTPATIENT
Start: 2021-01-01 | End: 2021-01-01 | Stop reason: HOSPADM

## 2021-01-01 RX ORDER — ATORVASTATIN CALCIUM 10 MG/1
10 TABLET, FILM COATED ORAL NIGHTLY
Status: DISCONTINUED | OUTPATIENT
Start: 2021-01-01 | End: 2021-01-01

## 2021-01-01 RX ORDER — L.ACID,CASEI/B.ANIMAL/S.THERMO 16B CELL
1 CAPSULE ORAL DAILY
COMMUNITY
End: 2021-01-01

## 2021-01-01 RX ORDER — CHOLECALCIFEROL (VITAMIN D3) 125 MCG
5 CAPSULE ORAL NIGHTLY PRN
Status: DISCONTINUED | OUTPATIENT
Start: 2021-01-01 | End: 2021-01-01 | Stop reason: HOSPADM

## 2021-01-01 RX ORDER — ACETAMINOPHEN 325 MG/1
975 TABLET ORAL ONCE
Status: COMPLETED | OUTPATIENT
Start: 2021-01-01 | End: 2021-01-01

## 2021-01-01 RX ORDER — LOPERAMIDE HYDROCHLORIDE 2 MG/1
2 CAPSULE ORAL AS NEEDED
COMMUNITY
End: 2021-01-01

## 2021-01-01 RX ORDER — LOPERAMIDE HYDROCHLORIDE 2 MG/1
2 CAPSULE ORAL 4 TIMES DAILY PRN
Status: DISCONTINUED | OUTPATIENT
Start: 2021-01-01 | End: 2021-01-01 | Stop reason: HOSPADM

## 2021-01-01 RX ORDER — CLOPIDOGREL BISULFATE 75 MG/1
75 TABLET ORAL DAILY
Status: DISCONTINUED | OUTPATIENT
Start: 2021-01-01 | End: 2021-01-01

## 2021-01-01 RX ORDER — ONDANSETRON 4 MG/1
4 TABLET, FILM COATED ORAL EVERY 6 HOURS PRN
Status: DISCONTINUED | OUTPATIENT
Start: 2021-01-01 | End: 2021-01-01 | Stop reason: HOSPADM

## 2021-01-01 RX ORDER — WARFARIN SODIUM 2 MG/1
2 TABLET ORAL
Status: DISCONTINUED | OUTPATIENT
Start: 2021-01-01 | End: 2021-01-01

## 2021-01-01 RX ORDER — WARFARIN SODIUM 1 MG
0.5 TABLET ORAL
Status: COMPLETED | OUTPATIENT
Start: 2021-01-01 | End: 2021-01-01

## 2021-01-01 RX ORDER — CHOLECALCIFEROL (VITAMIN D3) 125 MCG
5 CAPSULE ORAL NIGHTLY
Status: DISCONTINUED | OUTPATIENT
Start: 2021-01-01 | End: 2021-01-01

## 2021-01-01 RX ORDER — SERTRALINE HYDROCHLORIDE 25 MG/1
50 TABLET, FILM COATED ORAL EVERY EVENING
Status: DISCONTINUED | OUTPATIENT
Start: 2021-01-01 | End: 2021-01-01 | Stop reason: HOSPADM

## 2021-01-01 RX ORDER — CHOLECALCIFEROL (VITAMIN D3) 125 MCG
5 CAPSULE ORAL NIGHTLY
Status: DISCONTINUED | OUTPATIENT
Start: 2021-01-01 | End: 2021-01-01 | Stop reason: HOSPADM

## 2021-01-01 RX ORDER — TRAZODONE HYDROCHLORIDE 50 MG/1
50 TABLET ORAL NIGHTLY
Qty: 30 TABLET | Refills: 0 | Status: ON HOLD | OUTPATIENT
Start: 2021-01-01 | End: 2021-01-01

## 2021-01-01 RX ORDER — ARFORMOTEROL TARTRATE 15 UG/2ML
15 SOLUTION RESPIRATORY (INHALATION)
Status: DISCONTINUED | OUTPATIENT
Start: 2021-01-01 | End: 2021-01-01

## 2021-01-01 RX ORDER — ACETAMINOPHEN 500 MG
1000 TABLET ORAL EVERY 8 HOURS
Status: DISCONTINUED | OUTPATIENT
Start: 2021-01-01 | End: 2021-01-01 | Stop reason: HOSPADM

## 2021-01-01 RX ORDER — ACETAMINOPHEN 160 MG/5ML
650 SOLUTION ORAL EVERY 4 HOURS PRN
Status: DISCONTINUED | OUTPATIENT
Start: 2021-01-01 | End: 2021-01-01

## 2021-01-01 RX ORDER — IPRATROPIUM BROMIDE AND ALBUTEROL SULFATE 2.5; .5 MG/3ML; MG/3ML
3 SOLUTION RESPIRATORY (INHALATION) EVERY 6 HOURS PRN
Status: DISCONTINUED | OUTPATIENT
Start: 2021-01-01 | End: 2021-01-01 | Stop reason: HOSPADM

## 2021-01-01 RX ORDER — ATORVASTATIN CALCIUM 10 MG/1
10 TABLET, FILM COATED ORAL NIGHTLY
Qty: 90 TABLET | Refills: 0 | Status: ON HOLD | OUTPATIENT
Start: 2021-01-01 | End: 2021-01-01

## 2021-01-01 RX ORDER — HYDROCODONE BITARTRATE AND ACETAMINOPHEN 5; 325 MG/1; MG/1
1 TABLET ORAL EVERY 4 HOURS PRN
Status: DISCONTINUED | OUTPATIENT
Start: 2021-01-01 | End: 2021-01-01 | Stop reason: HOSPADM

## 2021-01-01 RX ORDER — SODIUM CHLORIDE FOR INHALATION 3 %
4 VIAL, NEBULIZER (ML) INHALATION ONCE
Status: COMPLETED | OUTPATIENT
Start: 2021-01-01 | End: 2021-01-01

## 2021-01-01 RX ORDER — MAGNESIUM SULFATE 1 G/100ML
1 INJECTION INTRAVENOUS
Status: COMPLETED | OUTPATIENT
Start: 2021-01-01 | End: 2021-01-01

## 2021-01-01 RX ORDER — WARFARIN SODIUM 3 MG
1.5 TABLET ORAL ONCE
Status: COMPLETED | OUTPATIENT
Start: 2021-01-01 | End: 2021-01-01

## 2021-01-01 RX ORDER — SODIUM CHLORIDE 9 MG/ML
75 INJECTION, SOLUTION INTRAVENOUS CONTINUOUS
Status: DISCONTINUED | OUTPATIENT
Start: 2021-01-01 | End: 2021-01-01

## 2021-01-01 RX ORDER — CHOLECALCIFEROL (VITAMIN D3) 125 MCG
1000 CAPSULE ORAL DAILY
Status: DISCONTINUED | OUTPATIENT
Start: 2021-01-01 | End: 2021-01-01 | Stop reason: HOSPADM

## 2021-01-01 RX ORDER — ONDANSETRON 4 MG/1
4 TABLET, FILM COATED ORAL EVERY 6 HOURS PRN
Status: DISCONTINUED | OUTPATIENT
Start: 2021-01-01 | End: 2021-01-01 | Stop reason: SDUPTHER

## 2021-01-01 RX ORDER — GUAIFENESIN 600 MG/1
1200 TABLET, EXTENDED RELEASE ORAL EVERY 12 HOURS SCHEDULED
Status: DISCONTINUED | OUTPATIENT
Start: 2021-01-01 | End: 2021-01-01

## 2021-01-01 RX ORDER — GABAPENTIN 300 MG/1
300 CAPSULE ORAL 2 TIMES DAILY
COMMUNITY
End: 2021-01-01 | Stop reason: SDUPTHER

## 2021-01-01 RX ORDER — ONDANSETRON 2 MG/ML
4 INJECTION INTRAMUSCULAR; INTRAVENOUS ONCE
Status: COMPLETED | OUTPATIENT
Start: 2021-01-01 | End: 2021-01-01

## 2021-01-01 RX ORDER — MEGESTROL ACETATE 40 MG/ML
100 SUSPENSION ORAL DAILY
Status: DISCONTINUED | OUTPATIENT
Start: 2021-01-01 | End: 2021-01-01 | Stop reason: HOSPADM

## 2021-01-01 RX ORDER — CLOPIDOGREL BISULFATE 75 MG/1
75 TABLET ORAL EVERY EVENING
COMMUNITY
End: 2021-01-01

## 2021-01-01 RX ORDER — ACETAMINOPHEN 650 MG/1
650 SUPPOSITORY RECTAL EVERY 4 HOURS PRN
Status: DISCONTINUED | OUTPATIENT
Start: 2021-01-01 | End: 2021-01-01

## 2021-01-01 RX ORDER — WARFARIN SODIUM 2 MG/1
TABLET ORAL
COMMUNITY
Start: 2021-01-01 | End: 2021-01-01

## 2021-01-01 RX ORDER — SODIUM CHLORIDE 0.9 % (FLUSH) 0.9 %
10 SYRINGE (ML) INJECTION AS NEEDED
Status: DISCONTINUED | OUTPATIENT
Start: 2021-01-01 | End: 2021-01-01

## 2021-01-01 RX ORDER — MIRTAZAPINE 30 MG/1
TABLET, FILM COATED ORAL
Qty: 90 TABLET | Refills: 0 | Status: SHIPPED | OUTPATIENT
Start: 2021-01-01 | End: 2021-01-01

## 2021-01-01 RX ORDER — CHOLECALCIFEROL (VITAMIN D3) 125 MCG
5 CAPSULE ORAL NIGHTLY PRN
Status: DISCONTINUED | OUTPATIENT
Start: 2021-01-01 | End: 2021-01-01

## 2021-01-01 RX ORDER — PANTOPRAZOLE SODIUM 40 MG/1
40 TABLET, DELAYED RELEASE ORAL
Status: DISCONTINUED | OUTPATIENT
Start: 2021-01-01 | End: 2021-01-01 | Stop reason: HOSPADM

## 2021-01-01 RX ORDER — SACCHAROMYCES BOULARDII 250 MG
250 CAPSULE ORAL 2 TIMES DAILY
Status: DISCONTINUED | OUTPATIENT
Start: 2021-01-01 | End: 2021-01-01 | Stop reason: HOSPADM

## 2021-01-01 RX ORDER — CHOLECALCIFEROL (VITAMIN D3) 125 MCG
10 CAPSULE ORAL NIGHTLY PRN
Status: DISCONTINUED | OUTPATIENT
Start: 2021-01-01 | End: 2021-01-01 | Stop reason: HOSPADM

## 2021-01-01 RX ORDER — NITROGLYCERIN 0.4 MG/1
0.4 TABLET SUBLINGUAL
Status: DISCONTINUED | OUTPATIENT
Start: 2021-01-01 | End: 2021-01-01 | Stop reason: HOSPADM

## 2021-01-01 RX ORDER — MIRTAZAPINE 30 MG/1
30 TABLET, FILM COATED ORAL DAILY
COMMUNITY
Start: 2021-01-01 | End: 2021-01-01

## 2021-01-01 RX ORDER — ERGOCALCIFEROL 1.25 MG/1
CAPSULE ORAL
COMMUNITY
End: 2021-01-01

## 2021-01-01 RX ORDER — LISINOPRIL 20 MG/1
TABLET ORAL
COMMUNITY
Start: 2021-01-01 | End: 2021-01-01

## 2021-01-01 RX ORDER — OMEGA-3S/DHA/EPA/FISH OIL/D3 300MG-1000
800 CAPSULE ORAL DAILY
COMMUNITY
End: 2021-01-01

## 2021-01-01 RX ORDER — CEFDINIR 300 MG/1
300 CAPSULE ORAL 2 TIMES DAILY
Qty: 1010 CAPSULE | Refills: 0 | Status: SHIPPED | OUTPATIENT
Start: 2021-01-01 | End: 2021-01-01

## 2021-01-01 RX ORDER — GABAPENTIN 300 MG/1
300 CAPSULE ORAL 2 TIMES DAILY
Qty: 180 CAPSULE | Refills: 0 | Status: SHIPPED | OUTPATIENT
Start: 2021-01-01 | End: 2021-01-01 | Stop reason: HOSPADM

## 2021-01-01 RX ORDER — WARFARIN SODIUM 3 MG
1.5 TABLET ORAL
Status: COMPLETED | OUTPATIENT
Start: 2021-01-01 | End: 2021-01-01

## 2021-01-01 RX ORDER — MULTIPLE VITAMINS W/ MINERALS TAB 9MG-400MCG
1 TAB ORAL DAILY
Status: DISCONTINUED | OUTPATIENT
Start: 2021-01-01 | End: 2021-01-01 | Stop reason: HOSPADM

## 2021-01-01 RX ORDER — DEXAMETHASONE SODIUM PHOSPHATE 10 MG/ML
6 INJECTION INTRAMUSCULAR; INTRAVENOUS DAILY
Status: DISCONTINUED | OUTPATIENT
Start: 2021-01-01 | End: 2021-01-01

## 2021-01-01 RX ORDER — SODIUM CHLORIDE 9 MG/ML
10 INJECTION INTRAVENOUS EVERY 12 HOURS SCHEDULED
Status: DISCONTINUED | OUTPATIENT
Start: 2021-01-01 | End: 2021-01-01 | Stop reason: HOSPADM

## 2021-01-01 RX ORDER — CHOLECALCIFEROL (VITAMIN D3) 125 MCG
10 CAPSULE ORAL NIGHTLY
Status: DISCONTINUED | OUTPATIENT
Start: 2021-01-01 | End: 2021-01-01

## 2021-01-01 RX ORDER — WARFARIN SODIUM 1 MG/1
1 TABLET ORAL
Status: COMPLETED | OUTPATIENT
Start: 2021-01-01 | End: 2021-01-01

## 2021-01-01 RX ORDER — GABAPENTIN 300 MG/1
300 CAPSULE ORAL 2 TIMES DAILY
Qty: 120 CAPSULE | Refills: 0 | Status: SHIPPED | OUTPATIENT
Start: 2021-01-01

## 2021-01-01 RX ORDER — CLOPIDOGREL BISULFATE 75 MG/1
75 TABLET ORAL EVERY EVENING
Status: DISCONTINUED | OUTPATIENT
Start: 2021-01-01 | End: 2021-01-01

## 2021-01-01 RX ORDER — MEGESTROL ACETATE 40 MG/ML
200 SUSPENSION ORAL DAILY
Status: DISCONTINUED | OUTPATIENT
Start: 2021-01-01 | End: 2021-01-01

## 2021-01-01 RX ADMIN — VANCOMYCIN HYDROCHLORIDE 750 MG: 5 INJECTION, POWDER, LYOPHILIZED, FOR SOLUTION INTRAVENOUS at 22:10

## 2021-01-01 RX ADMIN — MICONAZOLE NITRATE: 20 POWDER TOPICAL at 20:46

## 2021-01-01 RX ADMIN — DOCUSATE SODIUM 50MG AND SENNOSIDES 8.6MG 2 TABLET: 8.6; 5 TABLET, FILM COATED ORAL at 20:19

## 2021-01-01 RX ADMIN — SERTRALINE HYDROCHLORIDE 50 MG: 50 TABLET ORAL at 09:33

## 2021-01-01 RX ADMIN — MICONAZOLE NITRATE: 20 POWDER TOPICAL at 08:24

## 2021-01-01 RX ADMIN — CLOPIDOGREL BISULFATE 75 MG: 75 TABLET ORAL at 08:34

## 2021-01-01 RX ADMIN — MICONAZOLE NITRATE: 20 POWDER TOPICAL at 20:16

## 2021-01-01 RX ADMIN — LIDOCAINE 1 PATCH: 50 PATCH CUTANEOUS at 14:33

## 2021-01-01 RX ADMIN — PANTOPRAZOLE SODIUM 40 MG: 40 TABLET, DELAYED RELEASE ORAL at 05:24

## 2021-01-01 RX ADMIN — CEFEPIME HYDROCHLORIDE 2 G: 2 INJECTION, POWDER, FOR SOLUTION INTRAVENOUS at 00:26

## 2021-01-01 RX ADMIN — MEROPENEM 1 G: 1 INJECTION, POWDER, FOR SOLUTION INTRAVENOUS at 13:32

## 2021-01-01 RX ADMIN — SERTRALINE HYDROCHLORIDE 50 MG: 50 TABLET ORAL at 08:33

## 2021-01-01 RX ADMIN — SODIUM CHLORIDE, PRESERVATIVE FREE 10 ML: 5 INJECTION INTRAVENOUS at 20:59

## 2021-01-01 RX ADMIN — CLOPIDOGREL BISULFATE 75 MG: 75 TABLET, FILM COATED ORAL at 09:14

## 2021-01-01 RX ADMIN — WARFARIN SODIUM 2 MG: 2 TABLET ORAL at 18:05

## 2021-01-01 RX ADMIN — CEFEPIME HYDROCHLORIDE 2 G: 2 INJECTION, POWDER, FOR SOLUTION INTRAVENOUS at 18:04

## 2021-01-01 RX ADMIN — CEFDINIR 300 MG: 300 CAPSULE ORAL at 08:42

## 2021-01-01 RX ADMIN — FUROSEMIDE 40 MG: 40 TABLET ORAL at 09:17

## 2021-01-01 RX ADMIN — CLOPIDOGREL BISULFATE 75 MG: 75 TABLET, FILM COATED ORAL at 09:15

## 2021-01-01 RX ADMIN — SODIUM CHLORIDE 10 ML: 9 INJECTION, SOLUTION INTRAMUSCULAR; INTRAVENOUS; SUBCUTANEOUS at 20:52

## 2021-01-01 RX ADMIN — PANTOPRAZOLE SODIUM 40 MG: 40 TABLET, DELAYED RELEASE ORAL at 05:03

## 2021-01-01 RX ADMIN — ATORVASTATIN CALCIUM 10 MG: 10 TABLET, FILM COATED ORAL at 20:28

## 2021-01-01 RX ADMIN — DOCUSATE SODIUM 50MG AND SENNOSIDES 8.6MG 2 TABLET: 8.6; 5 TABLET, FILM COATED ORAL at 20:09

## 2021-01-01 RX ADMIN — MEROPENEM 1 G: 1 INJECTION, POWDER, FOR SOLUTION INTRAVENOUS at 04:07

## 2021-01-01 RX ADMIN — PANTOPRAZOLE SODIUM 40 MG: 40 TABLET, DELAYED RELEASE ORAL at 05:27

## 2021-01-01 RX ADMIN — ACETAMINOPHEN 650 MG: 325 TABLET ORAL at 03:07

## 2021-01-01 RX ADMIN — MAGNESIUM SULFATE 1 G: 1 INJECTION INTRAVENOUS at 00:42

## 2021-01-01 RX ADMIN — MICONAZOLE NITRATE: 20 POWDER TOPICAL at 09:19

## 2021-01-01 RX ADMIN — DOXYCYCLINE 100 MG: 100 CAPSULE ORAL at 20:57

## 2021-01-01 RX ADMIN — MIRTAZAPINE 30 MG: 15 TABLET, FILM COATED ORAL at 22:01

## 2021-01-01 RX ADMIN — SODIUM CHLORIDE, PRESERVATIVE FREE 10 ML: 5 INJECTION INTRAVENOUS at 20:04

## 2021-01-01 RX ADMIN — GUAIFENESIN 600 MG: 600 TABLET, EXTENDED RELEASE ORAL at 08:18

## 2021-01-01 RX ADMIN — DOCUSATE SODIUM 50MG AND SENNOSIDES 8.6MG 2 TABLET: 8.6; 5 TABLET, FILM COATED ORAL at 22:03

## 2021-01-01 RX ADMIN — SERTRALINE HYDROCHLORIDE 50 MG: 50 TABLET ORAL at 08:14

## 2021-01-01 RX ADMIN — MICONAZOLE NITRATE: 20 POWDER TOPICAL at 21:58

## 2021-01-01 RX ADMIN — DOCUSATE SODIUM 50MG AND SENNOSIDES 8.6MG 2 TABLET: 8.6; 5 TABLET, FILM COATED ORAL at 09:14

## 2021-01-01 RX ADMIN — Medication 5 MG: at 20:40

## 2021-01-01 RX ADMIN — POTASSIUM PHOSPHATE, MONOBASIC 500 MG: 500 TABLET, SOLUBLE ORAL at 09:15

## 2021-01-01 RX ADMIN — MIRTAZAPINE 30 MG: 15 TABLET, FILM COATED ORAL at 20:15

## 2021-01-01 RX ADMIN — Medication 1250 MG: at 22:01

## 2021-01-01 RX ADMIN — PANTOPRAZOLE SODIUM 40 MG: 40 TABLET, DELAYED RELEASE ORAL at 05:39

## 2021-01-01 RX ADMIN — DOCUSATE SODIUM 50MG AND SENNOSIDES 8.6MG 2 TABLET: 8.6; 5 TABLET, FILM COATED ORAL at 20:28

## 2021-01-01 RX ADMIN — CEFEPIME HYDROCHLORIDE 2 G: 2 INJECTION, POWDER, FOR SOLUTION INTRAVENOUS at 18:01

## 2021-01-01 RX ADMIN — CHOLECALCIFEROL (VITAMIN D3) 10 MCG (400 UNIT) TABLET 800 UNITS: at 08:42

## 2021-01-01 RX ADMIN — ACETAMINOPHEN 1000 MG: 500 TABLET ORAL at 20:40

## 2021-01-01 RX ADMIN — MIRTAZAPINE 30 MG: 15 TABLET, FILM COATED ORAL at 19:38

## 2021-01-01 RX ADMIN — WARFARIN SODIUM 2 MG: 2 TABLET ORAL at 17:43

## 2021-01-01 RX ADMIN — MICONAZOLE NITRATE: 20 POWDER TOPICAL at 21:06

## 2021-01-01 RX ADMIN — POTASSIUM CHLORIDE 20 MEQ: 10 CAPSULE, COATED, EXTENDED RELEASE ORAL at 17:43

## 2021-01-01 RX ADMIN — WARFARIN SODIUM 2 MG: 2 TABLET ORAL at 17:30

## 2021-01-01 RX ADMIN — SODIUM CHLORIDE, PRESERVATIVE FREE 10 ML: 5 INJECTION INTRAVENOUS at 21:07

## 2021-01-01 RX ADMIN — ATORVASTATIN CALCIUM 10 MG: 10 TABLET, FILM COATED ORAL at 20:40

## 2021-01-01 RX ADMIN — SERTRALINE HYDROCHLORIDE 50 MG: 50 TABLET ORAL at 08:40

## 2021-01-01 RX ADMIN — PANTOPRAZOLE SODIUM 40 MG: 40 TABLET, DELAYED RELEASE ORAL at 05:17

## 2021-01-01 RX ADMIN — DOXYCYCLINE 100 MG: 100 CAPSULE ORAL at 08:33

## 2021-01-01 RX ADMIN — SODIUM CHLORIDE, PRESERVATIVE FREE 10 ML: 5 INJECTION INTRAVENOUS at 00:30

## 2021-01-01 RX ADMIN — SODIUM CHLORIDE, PRESERVATIVE FREE 10 ML: 5 INJECTION INTRAVENOUS at 09:47

## 2021-01-01 RX ADMIN — SODIUM CHLORIDE, POTASSIUM CHLORIDE, SODIUM LACTATE AND CALCIUM CHLORIDE 500 ML: 600; 310; 30; 20 INJECTION, SOLUTION INTRAVENOUS at 15:27

## 2021-01-01 RX ADMIN — GUAIFENESIN 600 MG: 600 TABLET, EXTENDED RELEASE ORAL at 08:24

## 2021-01-01 RX ADMIN — LIDOCAINE 1 PATCH: 50 PATCH CUTANEOUS at 09:17

## 2021-01-01 RX ADMIN — LIDOCAINE 1 PATCH: 50 PATCH CUTANEOUS at 08:41

## 2021-01-01 RX ADMIN — LEVOTHYROXINE SODIUM 25 MCG: 0.03 TABLET ORAL at 09:15

## 2021-01-01 RX ADMIN — MICONAZOLE NITRATE 1 APPLICATION: 20 POWDER TOPICAL at 09:34

## 2021-01-01 RX ADMIN — LEVOTHYROXINE SODIUM 25 MCG: 0.03 TABLET ORAL at 10:01

## 2021-01-01 RX ADMIN — MIRTAZAPINE 30 MG: 15 TABLET, FILM COATED ORAL at 22:04

## 2021-01-01 RX ADMIN — CEFDINIR 300 MG: 300 CAPSULE ORAL at 20:28

## 2021-01-01 RX ADMIN — MIRTAZAPINE 30 MG: 15 TABLET, FILM COATED ORAL at 20:09

## 2021-01-01 RX ADMIN — CEFEPIME HYDROCHLORIDE 2 G: 2 INJECTION, POWDER, FOR SOLUTION INTRAVENOUS at 00:49

## 2021-01-01 RX ADMIN — CEFDINIR 300 MG: 300 CAPSULE ORAL at 20:10

## 2021-01-01 RX ADMIN — MICONAZOLE NITRATE: 20 POWDER TOPICAL at 09:11

## 2021-01-01 RX ADMIN — SODIUM CHLORIDE, PRESERVATIVE FREE 10 ML: 5 INJECTION INTRAVENOUS at 21:05

## 2021-01-01 RX ADMIN — CEFDINIR 300 MG: 300 CAPSULE ORAL at 08:59

## 2021-01-01 RX ADMIN — Medication 5 MG: at 20:36

## 2021-01-01 RX ADMIN — POTASSIUM CHLORIDE 20 MEQ: 10 CAPSULE, COATED, EXTENDED RELEASE ORAL at 09:50

## 2021-01-01 RX ADMIN — CEFDINIR 300 MG: 300 CAPSULE ORAL at 08:33

## 2021-01-01 RX ADMIN — SODIUM CHLORIDE, PRESERVATIVE FREE 10 ML: 5 INJECTION INTRAVENOUS at 08:18

## 2021-01-01 RX ADMIN — SERTRALINE HYDROCHLORIDE 50 MG: 50 TABLET ORAL at 08:48

## 2021-01-01 RX ADMIN — LEVOTHYROXINE SODIUM 25 MCG: 0.03 TABLET ORAL at 07:39

## 2021-01-01 RX ADMIN — WARFARIN SODIUM 2 MG: 2 TABLET ORAL at 18:00

## 2021-01-01 RX ADMIN — CEFDINIR 300 MG: 300 CAPSULE ORAL at 20:59

## 2021-01-01 RX ADMIN — PANTOPRAZOLE SODIUM 40 MG: 40 TABLET, DELAYED RELEASE ORAL at 08:24

## 2021-01-01 RX ADMIN — LIDOCAINE 1 PATCH: 50 PATCH CUTANEOUS at 08:23

## 2021-01-01 RX ADMIN — CLOPIDOGREL BISULFATE 75 MG: 75 TABLET ORAL at 23:06

## 2021-01-01 RX ADMIN — GUAIFENESIN 600 MG: 600 TABLET, EXTENDED RELEASE ORAL at 20:59

## 2021-01-01 RX ADMIN — SODIUM CHLORIDE 100 ML/HR: 9 INJECTION, SOLUTION INTRAVENOUS at 00:31

## 2021-01-01 RX ADMIN — MEROPENEM 1 G: 1 INJECTION, POWDER, FOR SOLUTION INTRAVENOUS at 05:20

## 2021-01-01 RX ADMIN — MAGNESIUM SULFATE HEPTAHYDRATE 1 G: 1 INJECTION, SOLUTION INTRAVENOUS at 14:58

## 2021-01-01 RX ADMIN — LEVOTHYROXINE SODIUM 25 MCG: 0.03 TABLET ORAL at 08:05

## 2021-01-01 RX ADMIN — MICONAZOLE NITRATE: 20 POWDER TOPICAL at 20:01

## 2021-01-01 RX ADMIN — Medication 1 CAPSULE: at 09:30

## 2021-01-01 RX ADMIN — PANTOPRAZOLE SODIUM 40 MG: 40 TABLET, DELAYED RELEASE ORAL at 05:07

## 2021-01-01 RX ADMIN — POTASSIUM CHLORIDE 20 MEQ: 10 CAPSULE, COATED, EXTENDED RELEASE ORAL at 17:33

## 2021-01-01 RX ADMIN — SODIUM CHLORIDE, PRESERVATIVE FREE 10 ML: 5 INJECTION INTRAVENOUS at 20:19

## 2021-01-01 RX ADMIN — POTASSIUM CHLORIDE 20 MEQ: 10 CAPSULE, COATED, EXTENDED RELEASE ORAL at 18:13

## 2021-01-01 RX ADMIN — DOCUSATE SODIUM 50MG AND SENNOSIDES 8.6MG 2 TABLET: 8.6; 5 TABLET, FILM COATED ORAL at 20:06

## 2021-01-01 RX ADMIN — SODIUM CHLORIDE, PRESERVATIVE FREE 10 ML: 5 INJECTION INTRAVENOUS at 20:45

## 2021-01-01 RX ADMIN — MIRTAZAPINE 30 MG: 15 TABLET, FILM COATED ORAL at 20:12

## 2021-01-01 RX ADMIN — CEFDINIR 300 MG: 300 CAPSULE ORAL at 21:56

## 2021-01-01 RX ADMIN — TRAZODONE HYDROCHLORIDE 50 MG: 50 TABLET ORAL at 20:15

## 2021-01-01 RX ADMIN — SODIUM CHLORIDE, PRESERVATIVE FREE 10 ML: 5 INJECTION INTRAVENOUS at 20:18

## 2021-01-01 RX ADMIN — LEVOTHYROXINE SODIUM 25 MCG: 0.03 TABLET ORAL at 08:11

## 2021-01-01 RX ADMIN — MIRTAZAPINE 30 MG: 15 TABLET, FILM COATED ORAL at 19:29

## 2021-01-01 RX ADMIN — Medication 1 TABLET: at 08:42

## 2021-01-01 RX ADMIN — SODIUM CHLORIDE, PRESERVATIVE FREE 10 ML: 5 INJECTION INTRAVENOUS at 09:31

## 2021-01-01 RX ADMIN — GUAIFENESIN 600 MG: 600 TABLET, EXTENDED RELEASE ORAL at 09:27

## 2021-01-01 RX ADMIN — PANTOPRAZOLE SODIUM 40 MG: 40 TABLET, DELAYED RELEASE ORAL at 06:30

## 2021-01-01 RX ADMIN — ATORVASTATIN CALCIUM 10 MG: 10 TABLET, FILM COATED ORAL at 20:57

## 2021-01-01 RX ADMIN — VANCOMYCIN HYDROCHLORIDE 750 MG: 5 INJECTION, POWDER, LYOPHILIZED, FOR SOLUTION INTRAVENOUS at 16:05

## 2021-01-01 RX ADMIN — SERTRALINE 50 MG: 25 TABLET, FILM COATED ORAL at 17:52

## 2021-01-01 RX ADMIN — RDII 250 MG CAPSULE 250 MG: at 11:49

## 2021-01-01 RX ADMIN — MEGESTROL ACETATE 100 MG: 40 SUSPENSION ORAL at 08:32

## 2021-01-01 RX ADMIN — SERTRALINE HYDROCHLORIDE 50 MG: 50 TABLET ORAL at 17:43

## 2021-01-01 RX ADMIN — PANTOPRAZOLE SODIUM 40 MG: 40 TABLET, DELAYED RELEASE ORAL at 06:07

## 2021-01-01 RX ADMIN — ACETAMINOPHEN 650 MG: 325 TABLET ORAL at 20:13

## 2021-01-01 RX ADMIN — CLOPIDOGREL BISULFATE 75 MG: 75 TABLET, FILM COATED ORAL at 09:18

## 2021-01-01 RX ADMIN — PANTOPRAZOLE SODIUM 40 MG: 40 TABLET, DELAYED RELEASE ORAL at 06:10

## 2021-01-01 RX ADMIN — CLOPIDOGREL BISULFATE 75 MG: 75 TABLET, FILM COATED ORAL at 08:35

## 2021-01-01 RX ADMIN — LIDOCAINE 1 PATCH: 50 PATCH CUTANEOUS at 08:24

## 2021-01-01 RX ADMIN — POTASSIUM PHOSPHATE, MONOBASIC 500 MG: 500 TABLET, SOLUBLE ORAL at 08:19

## 2021-01-01 RX ADMIN — WARFARIN SODIUM 2 MG: 2 TABLET ORAL at 17:58

## 2021-01-01 RX ADMIN — ATORVASTATIN CALCIUM 10 MG: 10 TABLET, FILM COATED ORAL at 20:20

## 2021-01-01 RX ADMIN — RDII 250 MG CAPSULE 250 MG: at 08:33

## 2021-01-01 RX ADMIN — MICONAZOLE NITRATE: 20 POWDER TOPICAL at 08:34

## 2021-01-01 RX ADMIN — LEVOTHYROXINE SODIUM 25 MCG: 0.03 TABLET ORAL at 09:06

## 2021-01-01 RX ADMIN — SERTRALINE HYDROCHLORIDE 50 MG: 50 TABLET ORAL at 09:09

## 2021-01-01 RX ADMIN — RDII 250 MG CAPSULE 250 MG: at 08:53

## 2021-01-01 RX ADMIN — PANTOPRAZOLE SODIUM 40 MG: 40 TABLET, DELAYED RELEASE ORAL at 06:23

## 2021-01-01 RX ADMIN — FUROSEMIDE 20 MG: 20 TABLET ORAL at 12:12

## 2021-01-01 RX ADMIN — PANTOPRAZOLE SODIUM 40 MG: 40 TABLET, DELAYED RELEASE ORAL at 05:25

## 2021-01-01 RX ADMIN — ACETAMINOPHEN 650 MG: 325 TABLET ORAL at 14:20

## 2021-01-01 RX ADMIN — MICONAZOLE NITRATE: 20 POWDER TOPICAL at 20:41

## 2021-01-01 RX ADMIN — IPRATROPIUM BROMIDE AND ALBUTEROL SULFATE 3 ML: .5; 2.5 SOLUTION RESPIRATORY (INHALATION) at 00:04

## 2021-01-01 RX ADMIN — ATORVASTATIN CALCIUM 10 MG: 10 TABLET, FILM COATED ORAL at 20:36

## 2021-01-01 RX ADMIN — POTASSIUM PHOSPHATE, MONOBASIC 500 MG: 500 TABLET, SOLUBLE ORAL at 12:50

## 2021-01-01 RX ADMIN — MIRTAZAPINE 30 MG: 15 TABLET, FILM COATED ORAL at 20:36

## 2021-01-01 RX ADMIN — RDII 250 MG CAPSULE 250 MG: at 08:23

## 2021-01-01 RX ADMIN — DOCUSATE SODIUM 50MG AND SENNOSIDES 8.6MG 2 TABLET: 8.6; 5 TABLET, FILM COATED ORAL at 06:13

## 2021-01-01 RX ADMIN — CLOPIDOGREL BISULFATE 75 MG: 75 TABLET, FILM COATED ORAL at 09:01

## 2021-01-01 RX ADMIN — MEROPENEM 1 G: 1 INJECTION INTRAVENOUS at 16:08

## 2021-01-01 RX ADMIN — SERTRALINE HYDROCHLORIDE 50 MG: 50 TABLET ORAL at 08:23

## 2021-01-01 RX ADMIN — MEROPENEM 1 G: 1 INJECTION, POWDER, FOR SOLUTION INTRAVENOUS at 13:12

## 2021-01-01 RX ADMIN — TRAZODONE HYDROCHLORIDE 50 MG: 50 TABLET ORAL at 20:19

## 2021-01-01 RX ADMIN — LEVOTHYROXINE SODIUM 25 MCG: 0.03 TABLET ORAL at 08:27

## 2021-01-01 RX ADMIN — CLOPIDOGREL BISULFATE 75 MG: 75 TABLET, FILM COATED ORAL at 08:39

## 2021-01-01 RX ADMIN — DOXYCYCLINE 100 MG: 100 CAPSULE ORAL at 20:40

## 2021-01-01 RX ADMIN — POTASSIUM CHLORIDE 20 MEQ: 10 CAPSULE, COATED, EXTENDED RELEASE ORAL at 17:52

## 2021-01-01 RX ADMIN — MEGESTROL ACETATE 100 MG: 40 SUSPENSION ORAL at 08:20

## 2021-01-01 RX ADMIN — Medication 10 MG: at 18:12

## 2021-01-01 RX ADMIN — Medication 1 CAPSULE: at 08:47

## 2021-01-01 RX ADMIN — LEVOTHYROXINE SODIUM 25 MCG: 0.03 TABLET ORAL at 08:34

## 2021-01-01 RX ADMIN — MEROPENEM 1 G: 1 INJECTION, POWDER, FOR SOLUTION INTRAVENOUS at 12:17

## 2021-01-01 RX ADMIN — MIRTAZAPINE 30 MG: 15 TABLET, FILM COATED ORAL at 20:46

## 2021-01-01 RX ADMIN — SERTRALINE HYDROCHLORIDE 50 MG: 50 TABLET ORAL at 09:07

## 2021-01-01 RX ADMIN — PANTOPRAZOLE SODIUM 40 MG: 40 TABLET, DELAYED RELEASE ORAL at 05:22

## 2021-01-01 RX ADMIN — SODIUM CHLORIDE, POTASSIUM CHLORIDE, SODIUM LACTATE AND CALCIUM CHLORIDE 150 ML/HR: 600; 310; 30; 20 INJECTION, SOLUTION INTRAVENOUS at 10:36

## 2021-01-01 RX ADMIN — GUAIFENESIN 600 MG: 600 TABLET, EXTENDED RELEASE ORAL at 22:00

## 2021-01-01 RX ADMIN — CEFEPIME HYDROCHLORIDE 2 G: 2 INJECTION, POWDER, FOR SOLUTION INTRAVENOUS at 17:00

## 2021-01-01 RX ADMIN — POLYETHYLENE GLYCOL 3350 17 G: 17 POWDER, FOR SOLUTION ORAL at 09:55

## 2021-01-01 RX ADMIN — ATORVASTATIN CALCIUM 10 MG: 10 TABLET, FILM COATED ORAL at 17:00

## 2021-01-01 RX ADMIN — CLOPIDOGREL BISULFATE 75 MG: 75 TABLET ORAL at 08:33

## 2021-01-01 RX ADMIN — SERTRALINE HYDROCHLORIDE 50 MG: 50 TABLET ORAL at 09:14

## 2021-01-01 RX ADMIN — Medication 5 MG: at 20:13

## 2021-01-01 RX ADMIN — CLOPIDOGREL BISULFATE 75 MG: 75 TABLET, FILM COATED ORAL at 08:14

## 2021-01-01 RX ADMIN — DOCUSATE SODIUM 50MG AND SENNOSIDES 8.6MG 2 TABLET: 8.6; 5 TABLET, FILM COATED ORAL at 08:23

## 2021-01-01 RX ADMIN — MIRTAZAPINE 30 MG: 15 TABLET, FILM COATED ORAL at 17:52

## 2021-01-01 RX ADMIN — MAGNESIUM SULFATE 1 G: 1 INJECTION INTRAVENOUS at 15:21

## 2021-01-01 RX ADMIN — CEFDINIR 300 MG: 300 CAPSULE ORAL at 08:23

## 2021-01-01 RX ADMIN — MEGESTROL ACETATE 100 MG: 40 SUSPENSION ORAL at 08:11

## 2021-01-01 RX ADMIN — SERTRALINE HYDROCHLORIDE 50 MG: 50 TABLET ORAL at 08:34

## 2021-01-01 RX ADMIN — RDII 250 MG CAPSULE 250 MG: at 20:27

## 2021-01-01 RX ADMIN — POTASSIUM CHLORIDE 20 MEQ: 10 CAPSULE, COATED, EXTENDED RELEASE ORAL at 07:38

## 2021-01-01 RX ADMIN — LIDOCAINE 1 PATCH: 50 PATCH CUTANEOUS at 09:08

## 2021-01-01 RX ADMIN — CEFDINIR 300 MG: 300 CAPSULE ORAL at 20:19

## 2021-01-01 RX ADMIN — SERTRALINE HYDROCHLORIDE 50 MG: 50 TABLET ORAL at 23:06

## 2021-01-01 RX ADMIN — SODIUM CHLORIDE 10 ML: 9 INJECTION, SOLUTION INTRAMUSCULAR; INTRAVENOUS; SUBCUTANEOUS at 15:17

## 2021-01-01 RX ADMIN — METRONIDAZOLE 500 MG: 500 INJECTION, SOLUTION INTRAVENOUS at 05:43

## 2021-01-01 RX ADMIN — MEROPENEM 1 G: 1 INJECTION INTRAVENOUS at 16:14

## 2021-01-01 RX ADMIN — WARFARIN SODIUM 2 MG: 2 TABLET ORAL at 20:15

## 2021-01-01 RX ADMIN — SODIUM CHLORIDE 1000 ML: 9 INJECTION, SOLUTION INTRAVENOUS at 17:42

## 2021-01-01 RX ADMIN — MEROPENEM 1 G: 1 INJECTION, POWDER, FOR SOLUTION INTRAVENOUS at 05:23

## 2021-01-01 RX ADMIN — ATORVASTATIN CALCIUM 10 MG: 10 TABLET, FILM COATED ORAL at 20:12

## 2021-01-01 RX ADMIN — PANTOPRAZOLE SODIUM 40 MG: 40 TABLET, DELAYED RELEASE ORAL at 08:14

## 2021-01-01 RX ADMIN — MICONAZOLE NITRATE: 20 POWDER TOPICAL at 20:28

## 2021-01-01 RX ADMIN — SODIUM CHLORIDE, PRESERVATIVE FREE 10 ML: 5 INJECTION INTRAVENOUS at 08:15

## 2021-01-01 RX ADMIN — SODIUM CHLORIDE 1 G: 9 INJECTION INTRAMUSCULAR; INTRAVENOUS; SUBCUTANEOUS at 17:42

## 2021-01-01 RX ADMIN — SERTRALINE HYDROCHLORIDE 50 MG: 50 TABLET ORAL at 08:11

## 2021-01-01 RX ADMIN — DOXYCYCLINE 100 MG: 100 CAPSULE ORAL at 08:20

## 2021-01-01 RX ADMIN — ACETAMINOPHEN 325 MG: 325 TABLET ORAL at 12:23

## 2021-01-01 RX ADMIN — MEROPENEM 1 G: 1 INJECTION, POWDER, FOR SOLUTION INTRAVENOUS at 16:07

## 2021-01-01 RX ADMIN — METRONIDAZOLE 500 MG: 500 INJECTION, SOLUTION INTRAVENOUS at 00:26

## 2021-01-01 RX ADMIN — LEVOTHYROXINE SODIUM 25 MCG: 0.03 TABLET ORAL at 08:14

## 2021-01-01 RX ADMIN — CLOPIDOGREL BISULFATE 75 MG: 75 TABLET ORAL at 17:43

## 2021-01-01 RX ADMIN — Medication 5 MG: at 20:48

## 2021-01-01 RX ADMIN — ACETAMINOPHEN 650 MG: 325 TABLET ORAL at 17:00

## 2021-01-01 RX ADMIN — POTASSIUM CHLORIDE 20 MEQ: 10 CAPSULE, COATED, EXTENDED RELEASE ORAL at 09:09

## 2021-01-01 RX ADMIN — PANTOPRAZOLE SODIUM 40 MG: 40 TABLET, DELAYED RELEASE ORAL at 05:32

## 2021-01-01 RX ADMIN — LEVOTHYROXINE SODIUM 25 MCG: 0.03 TABLET ORAL at 08:19

## 2021-01-01 RX ADMIN — ACETAMINOPHEN 1000 MG: 500 TABLET ORAL at 11:36

## 2021-01-01 RX ADMIN — LIDOCAINE 1 PATCH: 50 PATCH CUTANEOUS at 08:14

## 2021-01-01 RX ADMIN — SERTRALINE HYDROCHLORIDE 50 MG: 50 TABLET ORAL at 10:01

## 2021-01-01 RX ADMIN — Medication 5 MG: at 20:12

## 2021-01-01 RX ADMIN — ATORVASTATIN CALCIUM 10 MG: 10 TABLET, FILM COATED ORAL at 17:43

## 2021-01-01 RX ADMIN — MEGESTROL ACETATE 100 MG: 40 SUSPENSION ORAL at 09:16

## 2021-01-01 RX ADMIN — MEGESTROL ACETATE 100 MG: 40 SUSPENSION ORAL at 09:34

## 2021-01-01 RX ADMIN — TRAZODONE HYDROCHLORIDE 50 MG: 50 TABLET ORAL at 20:10

## 2021-01-01 RX ADMIN — ATORVASTATIN CALCIUM 10 MG: 10 TABLET, FILM COATED ORAL at 17:33

## 2021-01-01 RX ADMIN — MIRTAZAPINE 30 MG: 15 TABLET, FILM COATED ORAL at 20:54

## 2021-01-01 RX ADMIN — IPRATROPIUM BROMIDE AND ALBUTEROL SULFATE 3 ML: .5; 2.5 SOLUTION RESPIRATORY (INHALATION) at 07:54

## 2021-01-01 RX ADMIN — MEROPENEM 1 G: 1 INJECTION, POWDER, FOR SOLUTION INTRAVENOUS at 20:57

## 2021-01-01 RX ADMIN — MIRTAZAPINE 30 MG: 15 TABLET, FILM COATED ORAL at 21:22

## 2021-01-01 RX ADMIN — LIDOCAINE 1 PATCH: 50 PATCH CUTANEOUS at 08:45

## 2021-01-01 RX ADMIN — MICONAZOLE NITRATE: 20 POWDER TOPICAL at 08:12

## 2021-01-01 RX ADMIN — CYANOCOBALAMIN TAB 500 MCG 1000 MCG: 500 TAB at 08:42

## 2021-01-01 RX ADMIN — MEGESTROL ACETATE 100 MG: 40 SUSPENSION ORAL at 08:34

## 2021-01-01 RX ADMIN — SODIUM CHLORIDE, PRESERVATIVE FREE 10 ML: 5 INJECTION INTRAVENOUS at 20:56

## 2021-01-01 RX ADMIN — LEVOTHYROXINE SODIUM 25 MCG: 0.03 TABLET ORAL at 08:38

## 2021-01-01 RX ADMIN — LIDOCAINE 1 PATCH: 50 PATCH CUTANEOUS at 08:39

## 2021-01-01 RX ADMIN — MICONAZOLE NITRATE 1 APPLICATION: 20 POWDER TOPICAL at 08:37

## 2021-01-01 RX ADMIN — MIRTAZAPINE 30 MG: 15 TABLET, FILM COATED ORAL at 20:20

## 2021-01-01 RX ADMIN — Medication 5 MG: at 18:32

## 2021-01-01 RX ADMIN — POTASSIUM CHLORIDE 20 MEQ: 10 CAPSULE, COATED, EXTENDED RELEASE ORAL at 10:47

## 2021-01-01 RX ADMIN — SODIUM CHLORIDE, PRESERVATIVE FREE 10 ML: 5 INJECTION INTRAVENOUS at 08:24

## 2021-01-01 RX ADMIN — MIRTAZAPINE 30 MG: 15 TABLET, FILM COATED ORAL at 20:47

## 2021-01-01 RX ADMIN — LEVOTHYROXINE SODIUM 25 MCG: 0.03 TABLET ORAL at 09:16

## 2021-01-01 RX ADMIN — MIRTAZAPINE 30 MG: 15 TABLET, FILM COATED ORAL at 00:26

## 2021-01-01 RX ADMIN — MEGESTROL ACETATE 200 MG: 40 SUSPENSION ORAL at 17:11

## 2021-01-01 RX ADMIN — DOCUSATE SODIUM 50MG AND SENNOSIDES 8.6MG 2 TABLET: 8.6; 5 TABLET, FILM COATED ORAL at 20:36

## 2021-01-01 RX ADMIN — PANTOPRAZOLE SODIUM 40 MG: 40 TABLET, DELAYED RELEASE ORAL at 05:33

## 2021-01-01 RX ADMIN — Medication 5 MG: at 20:06

## 2021-01-01 RX ADMIN — POTASSIUM CHLORIDE 20 MEQ: 10 CAPSULE, COATED, EXTENDED RELEASE ORAL at 11:20

## 2021-01-01 RX ADMIN — ONDANSETRON 4 MG: 2 INJECTION INTRAMUSCULAR; INTRAVENOUS at 17:00

## 2021-01-01 RX ADMIN — PANTOPRAZOLE SODIUM 40 MG: 40 TABLET, DELAYED RELEASE ORAL at 05:28

## 2021-01-01 RX ADMIN — ACETAMINOPHEN 1000 MG: 500 TABLET ORAL at 20:09

## 2021-01-01 RX ADMIN — Medication 10 MG: at 19:37

## 2021-01-01 RX ADMIN — TUBERCULIN PURIFIED PROTEIN DERIVATIVE 5 UNITS: 5 INJECTION, SOLUTION INTRADERMAL at 20:55

## 2021-01-01 RX ADMIN — ACETAMINOPHEN 1000 MG: 500 TABLET ORAL at 11:08

## 2021-01-01 RX ADMIN — ATORVASTATIN CALCIUM 10 MG: 10 TABLET, FILM COATED ORAL at 20:09

## 2021-01-01 RX ADMIN — LIDOCAINE 1 PATCH: 50 PATCH CUTANEOUS at 08:33

## 2021-01-01 RX ADMIN — WARFARIN SODIUM 2 MG: 2 TABLET ORAL at 18:30

## 2021-01-01 RX ADMIN — MIRTAZAPINE 30 MG: 15 TABLET, FILM COATED ORAL at 20:57

## 2021-01-01 RX ADMIN — GUAIFENESIN 600 MG: 600 TABLET, EXTENDED RELEASE ORAL at 08:14

## 2021-01-01 RX ADMIN — CEFDINIR 300 MG: 300 CAPSULE ORAL at 09:02

## 2021-01-01 RX ADMIN — MICONAZOLE NITRATE: 20 POWDER TOPICAL at 20:52

## 2021-01-01 RX ADMIN — MICONAZOLE NITRATE: 20 POWDER TOPICAL at 09:10

## 2021-01-01 RX ADMIN — RDII 250 MG CAPSULE 250 MG: at 20:40

## 2021-01-01 RX ADMIN — ATORVASTATIN CALCIUM 10 MG: 10 TABLET, FILM COATED ORAL at 18:14

## 2021-01-01 RX ADMIN — PANTOPRAZOLE SODIUM 40 MG: 40 TABLET, DELAYED RELEASE ORAL at 05:30

## 2021-01-01 RX ADMIN — GUAIFENESIN 600 MG: 600 TABLET, EXTENDED RELEASE ORAL at 20:09

## 2021-01-01 RX ADMIN — RDII 250 MG CAPSULE 250 MG: at 21:56

## 2021-01-01 RX ADMIN — DOXYCYCLINE 100 MG: 100 CAPSULE ORAL at 09:06

## 2021-01-01 RX ADMIN — Medication 5 MG: at 20:46

## 2021-01-01 RX ADMIN — CLOPIDOGREL BISULFATE 75 MG: 75 TABLET ORAL at 10:02

## 2021-01-01 RX ADMIN — PANTOPRAZOLE SODIUM 40 MG: 40 TABLET, DELAYED RELEASE ORAL at 06:19

## 2021-01-01 RX ADMIN — SODIUM CHLORIDE, PRESERVATIVE FREE 10 ML: 5 INJECTION INTRAVENOUS at 08:37

## 2021-01-01 RX ADMIN — WARFARIN SODIUM 2 MG: 2 TABLET ORAL at 18:43

## 2021-01-01 RX ADMIN — SODIUM CHLORIDE, PRESERVATIVE FREE 10 ML: 5 INJECTION INTRAVENOUS at 09:18

## 2021-01-01 RX ADMIN — SODIUM CHLORIDE 250 ML: 9 INJECTION, SOLUTION INTRAVENOUS at 19:01

## 2021-01-01 RX ADMIN — PANTOPRAZOLE SODIUM 40 MG: 40 TABLET, DELAYED RELEASE ORAL at 06:09

## 2021-01-01 RX ADMIN — VANCOMYCIN HYDROCHLORIDE 750 MG: 5 INJECTION, POWDER, LYOPHILIZED, FOR SOLUTION INTRAVENOUS at 09:59

## 2021-01-01 RX ADMIN — MICONAZOLE NITRATE: 20 POWDER TOPICAL at 09:02

## 2021-01-01 RX ADMIN — WARFARIN SODIUM 2 MG: 2 TABLET ORAL at 18:13

## 2021-01-01 RX ADMIN — DOCUSATE SODIUM 50MG AND SENNOSIDES 8.6MG 2 TABLET: 8.6; 5 TABLET, FILM COATED ORAL at 19:30

## 2021-01-01 RX ADMIN — SODIUM CHLORIDE, PRESERVATIVE FREE 10 ML: 5 INJECTION INTRAVENOUS at 09:34

## 2021-01-01 RX ADMIN — SERTRALINE HYDROCHLORIDE 50 MG: 50 TABLET ORAL at 08:37

## 2021-01-01 RX ADMIN — PANTOPRAZOLE SODIUM 40 MG: 40 TABLET, DELAYED RELEASE ORAL at 05:48

## 2021-01-01 RX ADMIN — MEGESTROL ACETATE 100 MG: 40 SUSPENSION ORAL at 09:00

## 2021-01-01 RX ADMIN — CEFDINIR 300 MG: 300 CAPSULE ORAL at 20:13

## 2021-01-01 RX ADMIN — SERTRALINE HYDROCHLORIDE 50 MG: 50 TABLET ORAL at 16:56

## 2021-01-01 RX ADMIN — SODIUM CHLORIDE 10 ML: 9 INJECTION, SOLUTION INTRAMUSCULAR; INTRAVENOUS; SUBCUTANEOUS at 20:48

## 2021-01-01 RX ADMIN — IOPAMIDOL 100 ML: 755 INJECTION, SOLUTION INTRAVENOUS at 18:10

## 2021-01-01 RX ADMIN — ACETAMINOPHEN 975 MG: 325 TABLET ORAL at 19:04

## 2021-01-01 RX ADMIN — CEFEPIME HYDROCHLORIDE 2 G: 2 INJECTION, POWDER, FOR SOLUTION INTRAVENOUS at 06:23

## 2021-01-01 RX ADMIN — Medication 5 MG: at 00:26

## 2021-01-01 RX ADMIN — RDII 250 MG CAPSULE 250 MG: at 20:10

## 2021-01-01 RX ADMIN — PANTOPRAZOLE SODIUM 40 MG: 40 TABLET, DELAYED RELEASE ORAL at 08:18

## 2021-01-01 RX ADMIN — IOPAMIDOL 94 ML: 755 INJECTION, SOLUTION INTRAVENOUS at 10:44

## 2021-01-01 RX ADMIN — SERTRALINE 50 MG: 25 TABLET, FILM COATED ORAL at 18:29

## 2021-01-01 RX ADMIN — MICONAZOLE NITRATE: 20 POWDER TOPICAL at 20:04

## 2021-01-01 RX ADMIN — CLOPIDOGREL BISULFATE 75 MG: 75 TABLET, FILM COATED ORAL at 08:05

## 2021-01-01 RX ADMIN — SERTRALINE HYDROCHLORIDE 50 MG: 50 TABLET ORAL at 09:18

## 2021-01-01 RX ADMIN — MICONAZOLE NITRATE 1 APPLICATION: 20 POWDER TOPICAL at 08:23

## 2021-01-01 RX ADMIN — SODIUM CHLORIDE, POTASSIUM CHLORIDE, SODIUM LACTATE AND CALCIUM CHLORIDE 150 ML/HR: 600; 310; 30; 20 INJECTION, SOLUTION INTRAVENOUS at 10:33

## 2021-01-01 RX ADMIN — SODIUM CHLORIDE, PRESERVATIVE FREE 10 ML: 5 INJECTION INTRAVENOUS at 09:05

## 2021-01-01 RX ADMIN — SODIUM CHLORIDE, PRESERVATIVE FREE 10 ML: 5 INJECTION INTRAVENOUS at 08:56

## 2021-01-01 RX ADMIN — LIDOCAINE 1 PATCH: 50 PATCH CUTANEOUS at 08:21

## 2021-01-01 RX ADMIN — Medication 5 MG: at 20:59

## 2021-01-01 RX ADMIN — POTASSIUM CHLORIDE 40 MEQ: 10 CAPSULE, COATED, EXTENDED RELEASE ORAL at 08:18

## 2021-01-01 RX ADMIN — Medication 10 MG: at 23:06

## 2021-01-01 RX ADMIN — LEVOTHYROXINE SODIUM 25 MCG: 0.03 TABLET ORAL at 08:48

## 2021-01-01 RX ADMIN — ATORVASTATIN CALCIUM 10 MG: 10 TABLET, FILM COATED ORAL at 17:47

## 2021-01-01 RX ADMIN — MEGESTROL ACETATE 200 MG: 40 SUSPENSION ORAL at 08:40

## 2021-01-01 RX ADMIN — CLOPIDOGREL BISULFATE 75 MG: 75 TABLET ORAL at 09:33

## 2021-01-01 RX ADMIN — CLOPIDOGREL BISULFATE 75 MG: 75 TABLET ORAL at 08:53

## 2021-01-01 RX ADMIN — POTASSIUM PHOSPHATE, MONOBASIC 500 MG: 500 TABLET, SOLUBLE ORAL at 12:36

## 2021-01-01 RX ADMIN — CEFEPIME HYDROCHLORIDE 2 G: 2 INJECTION, POWDER, FOR SOLUTION INTRAVENOUS at 08:18

## 2021-01-01 RX ADMIN — MICONAZOLE NITRATE: 20 POWDER TOPICAL at 20:12

## 2021-01-01 RX ADMIN — VANCOMYCIN HYDROCHLORIDE 1250 MG: 5 INJECTION, POWDER, LYOPHILIZED, FOR SOLUTION INTRAVENOUS at 09:44

## 2021-01-01 RX ADMIN — CEFDINIR 300 MG: 300 CAPSULE ORAL at 20:09

## 2021-01-01 RX ADMIN — CEFEPIME HYDROCHLORIDE 2 G: 2 INJECTION, POWDER, FOR SOLUTION INTRAVENOUS at 09:23

## 2021-01-01 RX ADMIN — VANCOMYCIN HYDROCHLORIDE 1500 MG: 5 INJECTION, POWDER, LYOPHILIZED, FOR SOLUTION INTRAVENOUS at 00:27

## 2021-01-01 RX ADMIN — LIDOCAINE 1 PATCH: 50 PATCH CUTANEOUS at 08:34

## 2021-01-01 RX ADMIN — PANTOPRAZOLE SODIUM 40 MG: 40 TABLET, DELAYED RELEASE ORAL at 09:27

## 2021-01-01 RX ADMIN — VANCOMYCIN HYDROCHLORIDE 750 MG: 5 INJECTION, POWDER, LYOPHILIZED, FOR SOLUTION INTRAVENOUS at 09:32

## 2021-01-01 RX ADMIN — MIRTAZAPINE 30 MG: 15 TABLET, FILM COATED ORAL at 17:48

## 2021-01-01 RX ADMIN — ACETAMINOPHEN 1000 MG: 500 TABLET ORAL at 11:37

## 2021-01-01 RX ADMIN — RISPERIDONE 0.5 MG: 0.25 TABLET, FILM COATED ORAL at 17:57

## 2021-01-01 RX ADMIN — LEVOTHYROXINE SODIUM 25 MCG: 0.03 TABLET ORAL at 09:02

## 2021-01-01 RX ADMIN — TUBERCULIN PURIFIED PROTEIN DERIVATIVE 5 UNITS: 5 INJECTION, SOLUTION INTRADERMAL at 20:41

## 2021-01-01 RX ADMIN — WARFARIN SODIUM 2 MG: 2 TABLET ORAL at 17:34

## 2021-01-01 RX ADMIN — LIDOCAINE 1 PATCH: 50 PATCH CUTANEOUS at 09:14

## 2021-01-01 RX ADMIN — LEVOTHYROXINE SODIUM 25 MCG: 0.03 TABLET ORAL at 08:39

## 2021-01-01 RX ADMIN — POTASSIUM CHLORIDE 20 MEQ: 10 CAPSULE, COATED, EXTENDED RELEASE ORAL at 08:40

## 2021-01-01 RX ADMIN — Medication 5 MG: at 21:22

## 2021-01-01 RX ADMIN — ATORVASTATIN CALCIUM 10 MG: 10 TABLET, FILM COATED ORAL at 21:06

## 2021-01-01 RX ADMIN — ATORVASTATIN CALCIUM 10 MG: 10 TABLET, FILM COATED ORAL at 18:06

## 2021-01-01 RX ADMIN — DOCUSATE SODIUM 50MG AND SENNOSIDES 8.6MG 2 TABLET: 8.6; 5 TABLET, FILM COATED ORAL at 09:30

## 2021-01-01 RX ADMIN — FUROSEMIDE 40 MG: 40 TABLET ORAL at 09:09

## 2021-01-01 RX ADMIN — PANTOPRAZOLE SODIUM 40 MG: 40 TABLET, DELAYED RELEASE ORAL at 06:44

## 2021-01-01 RX ADMIN — MEROPENEM 1 G: 1 INJECTION INTRAVENOUS at 03:15

## 2021-01-01 RX ADMIN — TRAZODONE HYDROCHLORIDE 50 MG: 50 TABLET ORAL at 18:43

## 2021-01-01 RX ADMIN — GUAIFENESIN 600 MG: 600 TABLET, EXTENDED RELEASE ORAL at 20:14

## 2021-01-01 RX ADMIN — MAGNESIUM SULFATE 1 G: 1 INJECTION INTRAVENOUS at 12:25

## 2021-01-01 RX ADMIN — POTASSIUM CHLORIDE 20 MEQ: 10 CAPSULE, COATED, EXTENDED RELEASE ORAL at 11:38

## 2021-01-01 RX ADMIN — SODIUM CHLORIDE 1 G: 900 INJECTION INTRAVENOUS at 11:49

## 2021-01-01 RX ADMIN — MIRTAZAPINE 30 MG: 15 TABLET, FILM COATED ORAL at 20:06

## 2021-01-01 RX ADMIN — MEGESTROL ACETATE 100 MG: 40 SUSPENSION ORAL at 08:54

## 2021-01-01 RX ADMIN — SODIUM CHLORIDE, PRESERVATIVE FREE 10 ML: 5 INJECTION INTRAVENOUS at 20:16

## 2021-01-01 RX ADMIN — Medication 5 MG: at 20:09

## 2021-01-01 RX ADMIN — VANCOMYCIN HYDROCHLORIDE 1500 MG: 5 INJECTION, POWDER, LYOPHILIZED, FOR SOLUTION INTRAVENOUS at 00:47

## 2021-01-01 RX ADMIN — ATORVASTATIN CALCIUM 10 MG: 10 TABLET, FILM COATED ORAL at 20:48

## 2021-01-01 RX ADMIN — CLOPIDOGREL BISULFATE 75 MG: 75 TABLET ORAL at 16:56

## 2021-01-01 RX ADMIN — Medication 5 MG: at 22:01

## 2021-01-01 RX ADMIN — CEFDINIR 300 MG: 300 CAPSULE ORAL at 08:11

## 2021-01-01 RX ADMIN — MICONAZOLE NITRATE 1 APPLICATION: 20 POWDER TOPICAL at 21:11

## 2021-01-01 RX ADMIN — RDII 250 MG CAPSULE 250 MG: at 08:11

## 2021-01-01 RX ADMIN — SODIUM CHLORIDE, PRESERVATIVE FREE 10 ML: 5 INJECTION INTRAVENOUS at 08:50

## 2021-01-01 RX ADMIN — DOCUSATE SODIUM 50MG AND SENNOSIDES 8.6MG 2 TABLET: 8.6; 5 TABLET, FILM COATED ORAL at 06:23

## 2021-01-01 RX ADMIN — SODIUM CHLORIDE, PRESERVATIVE FREE 10 ML: 5 INJECTION INTRAVENOUS at 20:10

## 2021-01-01 RX ADMIN — LEVOTHYROXINE SODIUM 25 MCG: 0.03 TABLET ORAL at 08:53

## 2021-01-01 RX ADMIN — MICONAZOLE NITRATE: 20 POWDER TOPICAL at 20:37

## 2021-01-01 RX ADMIN — CEFEPIME HYDROCHLORIDE 2 G: 2 INJECTION, POWDER, FOR SOLUTION INTRAVENOUS at 06:44

## 2021-01-01 RX ADMIN — FUROSEMIDE 40 MG: 40 TABLET ORAL at 09:16

## 2021-01-01 RX ADMIN — LEVOTHYROXINE SODIUM 25 MCG: 0.03 TABLET ORAL at 08:40

## 2021-01-01 RX ADMIN — GUAIFENESIN 600 MG: 600 TABLET, EXTENDED RELEASE ORAL at 22:03

## 2021-01-01 RX ADMIN — DOXYCYCLINE 100 MG: 100 CAPSULE ORAL at 09:01

## 2021-01-01 RX ADMIN — POTASSIUM CHLORIDE 20 MEQ: 10 CAPSULE, COATED, EXTENDED RELEASE ORAL at 08:34

## 2021-01-01 RX ADMIN — GUAIFENESIN 600 MG: 600 TABLET, EXTENDED RELEASE ORAL at 00:26

## 2021-01-01 RX ADMIN — CYANOCOBALAMIN TAB 500 MCG 1000 MCG: 500 TAB at 08:47

## 2021-01-01 RX ADMIN — MEROPENEM 1 G: 1 INJECTION, POWDER, FOR SOLUTION INTRAVENOUS at 12:26

## 2021-01-01 RX ADMIN — DOCUSATE SODIUM 50MG AND SENNOSIDES 8.6MG 2 TABLET: 8.6; 5 TABLET, FILM COATED ORAL at 20:59

## 2021-01-01 RX ADMIN — LIDOCAINE 1 PATCH: 50 PATCH CUTANEOUS at 08:11

## 2021-01-01 RX ADMIN — LEVOTHYROXINE SODIUM 25 MCG: 0.03 TABLET ORAL at 08:33

## 2021-01-01 RX ADMIN — ACETAMINOPHEN 1000 MG: 500 TABLET ORAL at 20:27

## 2021-01-01 RX ADMIN — VANCOMYCIN HYDROCHLORIDE 1250 MG: 5 INJECTION, POWDER, LYOPHILIZED, FOR SOLUTION INTRAVENOUS at 09:38

## 2021-01-01 RX ADMIN — DOXYCYCLINE 100 MG: 100 CAPSULE ORAL at 20:47

## 2021-01-01 RX ADMIN — MEGESTROL ACETATE 100 MG: 40 SUSPENSION ORAL at 09:17

## 2021-01-01 RX ADMIN — GUAIFENESIN 600 MG: 600 TABLET, EXTENDED RELEASE ORAL at 08:23

## 2021-01-01 RX ADMIN — POTASSIUM CHLORIDE 20 MEQ: 10 CAPSULE, COATED, EXTENDED RELEASE ORAL at 09:59

## 2021-01-01 RX ADMIN — WARFARIN SODIUM 1.5 MG: 3 TABLET ORAL at 17:47

## 2021-01-01 RX ADMIN — MEROPENEM 1 G: 1 INJECTION, POWDER, FOR SOLUTION INTRAVENOUS at 05:32

## 2021-01-01 RX ADMIN — TRAZODONE HYDROCHLORIDE 50 MG: 50 TABLET ORAL at 20:09

## 2021-01-01 RX ADMIN — MEROPENEM 1 G: 1 INJECTION, POWDER, FOR SOLUTION INTRAVENOUS at 20:58

## 2021-01-01 RX ADMIN — MICONAZOLE NITRATE 1 APPLICATION: 20 POWDER TOPICAL at 09:17

## 2021-01-01 RX ADMIN — PANTOPRAZOLE SODIUM 40 MG: 40 TABLET, DELAYED RELEASE ORAL at 06:16

## 2021-01-01 RX ADMIN — SODIUM CHLORIDE SOLN NEBU 3% 4 ML: 3 NEBU SOLN at 00:04

## 2021-01-01 RX ADMIN — DOXYCYCLINE 100 MG: 100 CAPSULE ORAL at 20:15

## 2021-01-01 RX ADMIN — SERTRALINE HYDROCHLORIDE 50 MG: 50 TABLET ORAL at 08:42

## 2021-01-01 RX ADMIN — MIRTAZAPINE 30 MG: 15 TABLET, FILM COATED ORAL at 18:02

## 2021-01-01 RX ADMIN — SERTRALINE HYDROCHLORIDE 50 MG: 50 TABLET ORAL at 09:01

## 2021-01-01 RX ADMIN — SODIUM CHLORIDE 1000 ML: 9 INJECTION, SOLUTION INTRAVENOUS at 17:00

## 2021-01-01 RX ADMIN — DOCUSATE SODIUM 50MG AND SENNOSIDES 8.6MG 2 TABLET: 8.6; 5 TABLET, FILM COATED ORAL at 09:00

## 2021-01-01 RX ADMIN — DOCUSATE SODIUM 50MG AND SENNOSIDES 8.6MG 2 TABLET: 8.6; 5 TABLET, FILM COATED ORAL at 09:27

## 2021-01-01 RX ADMIN — Medication 5 MG: at 21:06

## 2021-01-01 RX ADMIN — Medication 5 MG: at 20:57

## 2021-01-01 RX ADMIN — GUAIFENESIN 600 MG: 600 TABLET, EXTENDED RELEASE ORAL at 08:42

## 2021-01-01 RX ADMIN — CEFDINIR 300 MG: 300 CAPSULE ORAL at 20:40

## 2021-01-01 RX ADMIN — CHOLECALCIFEROL (VITAMIN D3) 10 MCG (400 UNIT) TABLET 800 UNITS: at 08:47

## 2021-01-01 RX ADMIN — POTASSIUM PHOSPHATE, MONOBASIC 500 MG: 500 TABLET, SOLUBLE ORAL at 17:58

## 2021-01-01 RX ADMIN — WARFARIN SODIUM 2 MG: 2 TABLET ORAL at 17:52

## 2021-01-01 RX ADMIN — MEROPENEM 1 G: 1 INJECTION, POWDER, FOR SOLUTION INTRAVENOUS at 20:11

## 2021-01-01 RX ADMIN — LIDOCAINE 1 PATCH: 50 PATCH CUTANEOUS at 09:00

## 2021-01-01 RX ADMIN — SODIUM CHLORIDE 1000 ML: 9 INJECTION, SOLUTION INTRAVENOUS at 22:16

## 2021-01-01 RX ADMIN — ATORVASTATIN CALCIUM 10 MG: 10 TABLET, FILM COATED ORAL at 20:15

## 2021-01-01 RX ADMIN — DOCUSATE SODIUM 50MG AND SENNOSIDES 8.6MG 2 TABLET: 8.6; 5 TABLET, FILM COATED ORAL at 19:38

## 2021-01-01 RX ADMIN — DOXYCYCLINE 100 MG: 100 CAPSULE ORAL at 09:16

## 2021-01-01 RX ADMIN — DOCUSATE SODIUM 50MG AND SENNOSIDES 8.6MG 2 TABLET: 8.6; 5 TABLET, FILM COATED ORAL at 20:13

## 2021-01-01 RX ADMIN — Medication 1 TABLET: at 09:30

## 2021-01-01 RX ADMIN — FUROSEMIDE 40 MG: 40 TABLET ORAL at 12:17

## 2021-01-01 RX ADMIN — DOCUSATE SODIUM 50MG AND SENNOSIDES 8.6MG 2 TABLET: 8.6; 5 TABLET, FILM COATED ORAL at 08:14

## 2021-01-01 RX ADMIN — LIDOCAINE 1 PATCH: 50 PATCH CUTANEOUS at 09:13

## 2021-01-01 RX ADMIN — MICONAZOLE NITRATE: 20 POWDER TOPICAL at 10:03

## 2021-01-01 RX ADMIN — ATORVASTATIN CALCIUM 10 MG: 10 TABLET, FILM COATED ORAL at 18:33

## 2021-01-01 RX ADMIN — MIRTAZAPINE 30 MG: 15 TABLET, FILM COATED ORAL at 20:59

## 2021-01-01 RX ADMIN — SERTRALINE HYDROCHLORIDE 50 MG: 50 TABLET ORAL at 08:20

## 2021-01-01 RX ADMIN — DOCUSATE SODIUM 50MG AND SENNOSIDES 8.6MG 2 TABLET: 8.6; 5 TABLET, FILM COATED ORAL at 08:40

## 2021-01-01 RX ADMIN — ACETAMINOPHEN 1000 MG: 500 TABLET ORAL at 11:18

## 2021-01-01 RX ADMIN — Medication 5 MG: at 22:04

## 2021-01-01 RX ADMIN — CYANOCOBALAMIN TAB 500 MCG 1000 MCG: 500 TAB at 09:30

## 2021-01-01 RX ADMIN — MICONAZOLE NITRATE 1 APPLICATION: 20 POWDER TOPICAL at 08:36

## 2021-01-01 RX ADMIN — RDII 250 MG CAPSULE 250 MG: at 20:45

## 2021-01-01 RX ADMIN — WARFARIN SODIUM 1.5 MG: 3 TABLET ORAL at 18:06

## 2021-01-01 RX ADMIN — Medication 5 MG: at 20:54

## 2021-01-01 RX ADMIN — SODIUM CHLORIDE, PRESERVATIVE FREE 10 ML: 5 INJECTION INTRAVENOUS at 23:29

## 2021-01-01 RX ADMIN — GUAIFENESIN 600 MG: 600 TABLET, EXTENDED RELEASE ORAL at 09:00

## 2021-01-01 RX ADMIN — MIRTAZAPINE 30 MG: 15 TABLET, FILM COATED ORAL at 20:10

## 2021-01-01 RX ADMIN — MEGESTROL ACETATE 100 MG: 40 SUSPENSION ORAL at 09:06

## 2021-01-01 RX ADMIN — WARFARIN SODIUM 2 MG: 2 TABLET ORAL at 18:01

## 2021-01-01 RX ADMIN — SERTRALINE HYDROCHLORIDE 50 MG: 50 TABLET ORAL at 08:53

## 2021-01-01 RX ADMIN — MEGESTROL ACETATE 100 MG: 40 SUSPENSION ORAL at 08:23

## 2021-01-01 RX ADMIN — SODIUM CHLORIDE 10 ML: 9 INJECTION, SOLUTION INTRAMUSCULAR; INTRAVENOUS; SUBCUTANEOUS at 21:05

## 2021-01-01 RX ADMIN — ATORVASTATIN CALCIUM 10 MG: 10 TABLET, FILM COATED ORAL at 17:52

## 2021-01-01 RX ADMIN — LEVOTHYROXINE SODIUM 25 MCG: 0.03 TABLET ORAL at 16:56

## 2021-01-01 RX ADMIN — FUROSEMIDE 40 MG: 40 TABLET ORAL at 08:20

## 2021-01-01 RX ADMIN — SERTRALINE 50 MG: 25 TABLET, FILM COATED ORAL at 16:37

## 2021-01-01 RX ADMIN — MEGESTROL ACETATE 100 MG: 40 SUSPENSION ORAL at 08:37

## 2021-01-01 RX ADMIN — LIDOCAINE 1 PATCH: 50 PATCH CUTANEOUS at 08:35

## 2021-01-01 RX ADMIN — POTASSIUM CHLORIDE 20 MEQ: 10 CAPSULE, COATED, EXTENDED RELEASE ORAL at 10:01

## 2021-01-01 RX ADMIN — CLOPIDOGREL BISULFATE 75 MG: 75 TABLET, FILM COATED ORAL at 08:40

## 2021-01-01 RX ADMIN — GUAIFENESIN 600 MG: 600 TABLET, EXTENDED RELEASE ORAL at 20:19

## 2021-01-01 RX ADMIN — MIRTAZAPINE 30 MG: 15 TABLET, FILM COATED ORAL at 23:06

## 2021-01-01 RX ADMIN — CEFEPIME HYDROCHLORIDE 2 G: 2 INJECTION, POWDER, FOR SOLUTION INTRAVENOUS at 08:14

## 2021-01-01 RX ADMIN — POTASSIUM CHLORIDE 20 MEQ: 10 CAPSULE, COATED, EXTENDED RELEASE ORAL at 17:00

## 2021-01-01 RX ADMIN — MEGESTROL ACETATE 100 MG: 40 SUSPENSION ORAL at 09:08

## 2021-01-01 RX ADMIN — LEVOTHYROXINE SODIUM 25 MCG: 0.03 TABLET ORAL at 08:42

## 2021-01-01 RX ADMIN — VANCOMYCIN HYDROCHLORIDE 750 MG: 5 INJECTION, POWDER, LYOPHILIZED, FOR SOLUTION INTRAVENOUS at 03:25

## 2021-01-01 RX ADMIN — FUROSEMIDE 20 MG: 20 TABLET ORAL at 16:01

## 2021-01-01 RX ADMIN — POTASSIUM CHLORIDE 40 MEQ: 10 CAPSULE, COATED, EXTENDED RELEASE ORAL at 16:37

## 2021-01-01 RX ADMIN — CHOLECALCIFEROL (VITAMIN D3) 10 MCG (400 UNIT) TABLET 800 UNITS: at 09:30

## 2021-01-01 RX ADMIN — CLOPIDOGREL BISULFATE 75 MG: 75 TABLET, FILM COATED ORAL at 08:34

## 2021-01-01 RX ADMIN — PANTOPRAZOLE SODIUM 40 MG: 40 TABLET, DELAYED RELEASE ORAL at 05:51

## 2021-01-01 RX ADMIN — LEVOTHYROXINE SODIUM 25 MCG: 0.03 TABLET ORAL at 09:09

## 2021-01-01 RX ADMIN — VANCOMYCIN HYDROCHLORIDE 1250 MG: 5 INJECTION, POWDER, LYOPHILIZED, FOR SOLUTION INTRAVENOUS at 08:55

## 2021-01-01 RX ADMIN — CLOPIDOGREL BISULFATE 75 MG: 75 TABLET, FILM COATED ORAL at 09:05

## 2021-01-01 RX ADMIN — MIRTAZAPINE 30 MG: 15 TABLET, FILM COATED ORAL at 18:32

## 2021-01-01 RX ADMIN — WARFARIN SODIUM 0.5 MG: 1 TABLET ORAL at 18:13

## 2021-01-01 RX ADMIN — MIRTAZAPINE 30 MG: 15 TABLET, FILM COATED ORAL at 20:28

## 2021-01-01 RX ADMIN — POTASSIUM CHLORIDE 20 MEQ: 10 CAPSULE, COATED, EXTENDED RELEASE ORAL at 18:05

## 2021-01-01 RX ADMIN — MEROPENEM 1 G: 1 INJECTION, POWDER, FOR SOLUTION INTRAVENOUS at 20:40

## 2021-01-01 RX ADMIN — MIRTAZAPINE 30 MG: 15 TABLET, FILM COATED ORAL at 21:06

## 2021-01-01 RX ADMIN — Medication 5 MG: at 20:28

## 2021-01-01 RX ADMIN — MEROPENEM 1 G: 1 INJECTION INTRAVENOUS at 04:09

## 2021-01-01 RX ADMIN — SODIUM CHLORIDE, PRESERVATIVE FREE 10 ML: 5 INJECTION INTRAVENOUS at 09:28

## 2021-01-01 RX ADMIN — SODIUM CHLORIDE, PRESERVATIVE FREE 10 ML: 5 INJECTION INTRAVENOUS at 09:01

## 2021-01-01 RX ADMIN — SODIUM CHLORIDE 250 ML: 9 INJECTION, SOLUTION INTRAVENOUS at 18:00

## 2021-01-01 RX ADMIN — ACETAMINOPHEN 1000 MG: 500 TABLET ORAL at 11:52

## 2021-01-01 RX ADMIN — ATORVASTATIN CALCIUM 10 MG: 10 TABLET, FILM COATED ORAL at 20:59

## 2021-01-01 RX ADMIN — MEROPENEM 1 G: 1 INJECTION, POWDER, FOR SOLUTION INTRAVENOUS at 20:53

## 2021-01-01 RX ADMIN — LEVOTHYROXINE SODIUM 25 MCG: 0.03 TABLET ORAL at 08:37

## 2021-01-01 RX ADMIN — BISACODYL 5 MG: 5 TABLET, COATED ORAL at 10:27

## 2021-01-01 RX ADMIN — ATORVASTATIN CALCIUM 10 MG: 10 TABLET, FILM COATED ORAL at 21:22

## 2021-01-01 RX ADMIN — POTASSIUM PHOSPHATE, MONOBASIC 500 MG: 500 TABLET, SOLUBLE ORAL at 17:30

## 2021-01-01 RX ADMIN — MIRTAZAPINE 30 MG: 15 TABLET, FILM COATED ORAL at 17:33

## 2021-01-01 RX ADMIN — Medication 5 MG: at 20:20

## 2021-01-01 RX ADMIN — PANTOPRAZOLE SODIUM 40 MG: 40 TABLET, DELAYED RELEASE ORAL at 05:55

## 2021-01-01 RX ADMIN — MIRTAZAPINE 30 MG: 15 TABLET, FILM COATED ORAL at 17:57

## 2021-01-01 RX ADMIN — MICONAZOLE NITRATE 1 APPLICATION: 20 POWDER TOPICAL at 08:54

## 2021-01-01 RX ADMIN — Medication 10 MG: at 19:29

## 2021-01-01 RX ADMIN — MEROPENEM 1 G: 1 INJECTION, POWDER, FOR SOLUTION INTRAVENOUS at 05:08

## 2021-01-01 RX ADMIN — ACETAMINOPHEN 1000 MG: 500 TABLET ORAL at 20:46

## 2021-01-01 RX ADMIN — Medication 1 TABLET: at 08:47

## 2021-01-01 RX ADMIN — SODIUM CHLORIDE, PRESERVATIVE FREE 10 ML: 5 INJECTION INTRAVENOUS at 12:40

## 2021-01-01 RX ADMIN — SODIUM CHLORIDE, PRESERVATIVE FREE 10 ML: 5 INJECTION INTRAVENOUS at 20:40

## 2021-01-01 RX ADMIN — CLOPIDOGREL BISULFATE 75 MG: 75 TABLET ORAL at 08:11

## 2021-01-01 RX ADMIN — CEFEPIME HYDROCHLORIDE 2 G: 2 INJECTION, POWDER, FOR SOLUTION INTRAVENOUS at 02:32

## 2021-01-01 RX ADMIN — ACETAMINOPHEN 650 MG: 325 TABLET ORAL at 08:16

## 2021-01-01 RX ADMIN — SERTRALINE HYDROCHLORIDE 50 MG: 50 TABLET ORAL at 09:15

## 2021-01-01 RX ADMIN — SERTRALINE HYDROCHLORIDE 50 MG: 50 TABLET ORAL at 08:05

## 2021-01-01 RX ADMIN — LEVOTHYROXINE SODIUM 25 MCG: 0.03 TABLET ORAL at 17:43

## 2021-01-01 RX ADMIN — ATORVASTATIN CALCIUM 10 MG: 10 TABLET, FILM COATED ORAL at 17:58

## 2021-01-01 RX ADMIN — LIDOCAINE 1 PATCH: 50 PATCH CUTANEOUS at 10:00

## 2021-01-01 RX ADMIN — SODIUM CHLORIDE 75 ML/HR: 9 INJECTION, SOLUTION INTRAVENOUS at 15:28

## 2021-01-01 RX ADMIN — CEFEPIME HYDROCHLORIDE 2 G: 2 INJECTION, POWDER, FOR SOLUTION INTRAVENOUS at 19:00

## 2021-01-01 RX ADMIN — SODIUM CHLORIDE, POTASSIUM CHLORIDE, SODIUM LACTATE AND CALCIUM CHLORIDE 150 ML/HR: 600; 310; 30; 20 INJECTION, SOLUTION INTRAVENOUS at 16:50

## 2021-01-01 RX ADMIN — LIDOCAINE 1 PATCH: 50 PATCH CUTANEOUS at 08:42

## 2021-01-01 RX ADMIN — WARFARIN SODIUM 2 MG: 2 TABLET ORAL at 18:12

## 2021-01-01 RX ADMIN — PANTOPRAZOLE SODIUM 40 MG: 40 TABLET, DELAYED RELEASE ORAL at 06:13

## 2021-01-01 RX ADMIN — WARFARIN SODIUM 2 MG: 2 TABLET ORAL at 18:09

## 2021-01-01 RX ADMIN — ATORVASTATIN CALCIUM 10 MG: 10 TABLET, FILM COATED ORAL at 22:00

## 2021-01-01 RX ADMIN — FUROSEMIDE 40 MG: 40 TABLET ORAL at 08:33

## 2021-01-01 RX ADMIN — LIDOCAINE 1 PATCH: 50 PATCH CUTANEOUS at 08:37

## 2021-01-01 RX ADMIN — ATORVASTATIN CALCIUM 10 MG: 10 TABLET, FILM COATED ORAL at 22:03

## 2021-01-01 RX ADMIN — PANTOPRAZOLE SODIUM 40 MG: 40 TABLET, DELAYED RELEASE ORAL at 05:05

## 2021-01-01 RX ADMIN — MIRTAZAPINE 30 MG: 15 TABLET, FILM COATED ORAL at 17:43

## 2021-01-01 RX ADMIN — CEFEPIME HYDROCHLORIDE 2 G: 2 INJECTION, POWDER, FOR SOLUTION INTRAVENOUS at 17:43

## 2021-01-01 RX ADMIN — DOCUSATE SODIUM 50MG AND SENNOSIDES 8.6MG 2 TABLET: 8.6; 5 TABLET, FILM COATED ORAL at 08:24

## 2021-01-01 RX ADMIN — LEVOTHYROXINE SODIUM 25 MCG: 0.03 TABLET ORAL at 09:17

## 2021-01-01 RX ADMIN — ATORVASTATIN CALCIUM 10 MG: 10 TABLET, FILM COATED ORAL at 20:06

## 2021-01-01 RX ADMIN — DOCUSATE SODIUM 50MG AND SENNOSIDES 8.6MG 2 TABLET: 8.6; 5 TABLET, FILM COATED ORAL at 23:06

## 2021-01-01 RX ADMIN — SODIUM CHLORIDE, PRESERVATIVE FREE 10 ML: 5 INJECTION INTRAVENOUS at 22:09

## 2021-01-01 RX ADMIN — MIRTAZAPINE 30 MG: 15 TABLET, FILM COATED ORAL at 18:06

## 2021-01-01 RX ADMIN — CLOPIDOGREL BISULFATE 75 MG: 75 TABLET, FILM COATED ORAL at 08:19

## 2021-01-01 RX ADMIN — Medication 5 MG: at 20:58

## 2021-01-01 RX ADMIN — ATORVASTATIN CALCIUM 10 MG: 10 TABLET, FILM COATED ORAL at 20:54

## 2021-01-01 RX ADMIN — WARFARIN SODIUM 1 MG: 1 TABLET ORAL at 17:33

## 2021-01-01 RX ADMIN — ATORVASTATIN CALCIUM 10 MG: 10 TABLET, FILM COATED ORAL at 20:46

## 2021-01-01 RX ADMIN — MIRTAZAPINE 30 MG: 15 TABLET, FILM COATED ORAL at 20:39

## 2021-01-01 RX ADMIN — Medication 5 MG: at 20:15

## 2021-01-01 RX ADMIN — PANTOPRAZOLE SODIUM 40 MG: 40 TABLET, DELAYED RELEASE ORAL at 06:04

## 2021-01-01 RX ADMIN — CEFDINIR 300 MG: 300 CAPSULE ORAL at 14:08

## 2021-01-01 RX ADMIN — Medication 1 CAPSULE: at 10:00

## 2021-01-01 RX ADMIN — SODIUM CHLORIDE, PRESERVATIVE FREE 10 ML: 5 INJECTION INTRAVENOUS at 20:58

## 2021-01-01 RX ADMIN — MAGNESIUM SULFATE 1 G: 1 INJECTION INTRAVENOUS at 01:22

## 2021-01-01 RX ADMIN — ATORVASTATIN CALCIUM 10 MG: 10 TABLET, FILM COATED ORAL at 20:14

## 2021-01-01 RX ADMIN — DOXYCYCLINE 100 MG: 100 INJECTION, POWDER, LYOPHILIZED, FOR SOLUTION INTRAVENOUS at 20:16

## 2021-01-01 RX ADMIN — PANTOPRAZOLE SODIUM 40 MG: 40 TABLET, DELAYED RELEASE ORAL at 05:26

## 2021-01-01 RX ADMIN — SERTRALINE HYDROCHLORIDE 50 MG: 50 TABLET ORAL at 13:13

## 2021-01-01 RX ADMIN — WARFARIN SODIUM 2 MG: 2 TABLET ORAL at 14:00

## 2021-01-01 RX ADMIN — DOCUSATE SODIUM 50MG AND SENNOSIDES 8.6MG 2 TABLET: 8.6; 5 TABLET, FILM COATED ORAL at 05:07

## 2021-01-01 RX ADMIN — DOXYCYCLINE 100 MG: 100 CAPSULE ORAL at 21:06

## 2021-01-01 RX ADMIN — CLOPIDOGREL BISULFATE 75 MG: 75 TABLET ORAL at 08:23

## 2021-01-01 RX ADMIN — LEVOTHYROXINE SODIUM 25 MCG: 0.03 TABLET ORAL at 23:06

## 2021-01-01 RX ADMIN — LIDOCAINE 1 PATCH: 50 PATCH CUTANEOUS at 09:33

## 2021-01-01 RX ADMIN — RDII 250 MG CAPSULE 250 MG: at 08:32

## 2021-01-01 RX ADMIN — CLOPIDOGREL BISULFATE 75 MG: 75 TABLET, FILM COATED ORAL at 08:48

## 2021-01-01 RX ADMIN — SODIUM CHLORIDE, POTASSIUM CHLORIDE, SODIUM LACTATE AND CALCIUM CHLORIDE 500 ML: 600; 310; 30; 20 INJECTION, SOLUTION INTRAVENOUS at 10:51

## 2021-01-01 RX ADMIN — SODIUM CHLORIDE, PRESERVATIVE FREE 10 ML: 5 INJECTION INTRAVENOUS at 10:00

## 2021-01-01 RX ADMIN — CEFEPIME HYDROCHLORIDE 2 G: 2 INJECTION, POWDER, FOR SOLUTION INTRAVENOUS at 05:26

## 2021-01-01 RX ADMIN — LIDOCAINE 1 PATCH: 50 PATCH CUTANEOUS at 09:34

## 2021-01-01 RX ADMIN — LIDOCAINE 1 PATCH: 50 PATCH CUTANEOUS at 08:53

## 2021-01-01 RX ADMIN — MIRTAZAPINE 30 MG: 15 TABLET, FILM COATED ORAL at 18:14

## 2021-01-01 RX ADMIN — MEGESTROL ACETATE 100 MG: 40 SUSPENSION ORAL at 10:00

## 2021-01-01 RX ADMIN — MAGNESIUM SULFATE HEPTAHYDRATE 1 G: 1 INJECTION, SOLUTION INTRAVENOUS at 13:37

## 2021-05-12 NOTE — PROGRESS NOTES
"   Quick Note      Patient Name: Noelle Nazario   Patient ID: [id number]   Sex: Female   YOB: 1940    Primary Care Provider: Marquita Cook MD    Visit Date: April 10, 2020    Provider: Marquita Cook MD   Location: [facility]   Location Address: [address] [place] [address], [address]  [place], [place]  [id number]   Location Phone: [phone]          History Of Present Illness  TELEHEALTH VISIT  Chief Complaint: \"just a check up of my blood pressure\"   Noelle Nazario is a 79 year old /White female who is presenting for evaluation via telehealth visit. Verbal consent obtained before beginning visit.   Provider spent 19 minutes with the patient during telehealth visit.   The following staff were present during this visit: none   Past Medical History/Overview of Patient Symptoms     call done over telephone  had a discussion with her daughter, son and patient  she feels well  breathing well  no chest pain    family is concerned with hallucinations  patient does admit to this, but says it really only happened once or twice and doesn't bother her  family feels that it is happening more frequently  however they aren't interested in treating at this time    feels well since being discharged from the hospital  she is staying home    bp has been running in the 120-130's typically       Physical Examination                Assessment  · Emphysema     492.8/J43.9  · HTN (hypertension)     401.9/I10  · Hyperlipemia     272.4/E78.5  · Hallucination     780.1/R44.3  she and family will monitor for now  however if symptoms worsen they will let me know     chronic issues well controlled  cont current meds     Problems Reconciled  Plan  · Orders  o Physician Telephone Evaluation, 11-20 minutes (26244) - - 04/12/2020  · Medications  o Medications have been Reconciled  o Transition of Care or Provider Policy  · Instructions  o Plan Of Care:             Electronically Signed by: Marquita Cook, " MD -Author on April 12, 2020 04:25:15 PM

## 2021-05-13 NOTE — PROGRESS NOTES
"   Progress Note      Patient Name: Noelle Nzaario   Patient ID: [id number]   Sex: Female   YOB: 1940    Primary Care Provider: Marquita Cook MD   Referring Provider: Marquita Cook MD    Visit Date: December 4, 2020    Provider: Marquita Cook MD   Location: [facility]   Location Address: [address] [place] [address]  [id number]   Location Phone: [phone]          Chief Complaint  · Follow-up  · \"I am here for a follow up\"  · \"I need my INR checked\"  · \"I need to check on my warfarin dosing\"  · \"I would like to see about getting more pills of gabapentin\"  · \"my knees have been bothering me\"  · \"I need some refills\"      History Of Present Illness  Noelle Nazario is a 79 year old /White female who presents for evaluation and treatment of:      chronic issues    Overall doing better  No hallucinations  No chest pain  No trouble breathing  No leg swelling    still having severe knee pain  difficulty walking  can't even get up out of her chair at times       Past Medical History  Disease Name Date Onset Notes   Allergies --  --    Arthritis --  --    Blood Diseases --  --    Broken Bones --  --    Cancer --  --    Cataract --  --    Chest pain --  stress by Dr. Medrano in 12/2015 was normal, because of her risk factors he recommened proceeding with cath, however she is reluctant to do this at this time, if chest pain worsens she will consider cath in future   Emphysema --  --    GERD --  --    Hodgkin's Lymphoma --  --    HTN (hypertension) --  --    Hyperlipemia --  --    Long term (current) use of anticoagulants --  --    Night sweats --  --    Pulmonary embolism --  --    Stroke --  --          Past Surgical History  Procedure Name Date Notes   Breast biopsy --  --    Hysterectomy --  --          Medication List  Name Date Started Instructions   biotin oral  --    Calcium 500 + D oral  --    Cipro 500 mg oral tablet 12/06/2020 take 1 tablet (500 " mg) by oral route 2 times per day for 7 days   clopidogrel 75 mg oral tablet 2020 TAKE 1 TABLET(75 MG) BY MOUTH EVERY DAY   ferrous sulfate 325 mg (65 mg iron) oral tablet 2020 TAKE 1 TABLET(325 MG) BY MOUTH TWICE DAILY   gabapentin 300 mg oral capsule 12/15/2020 Take 1 tablet by oral route twice daily   levothyroxine 25 mcg oral tablet 2020 TAKE 1 TABLET(25 MCG) BY MOUTH EVERY DAY   lisinopril 20 mg oral tablet 2020 TAKE 1 TABLET(20 MG) BY MOUTH EVERY DAY   mirtazapine 30 mg oral tablet 2020 TAKE 1 TABLET BY MOUTH DAILY   Multivitamin 50 Plus oral tablet  take 1 tablet by oral route every morning   omeprazole 20 mg oral capsule,delayed release(DR/EC) 2019 take 1 capsule (20 mg) by oral route once daily before a meal   Osteo Bi-Flex 250-200 mg oral tablet 2020 take 1 tablet by oral route daily for 90 days   Probiotic oral  take 1 by oral route daily   sertraline 25 mg oral tablet 2020 TAKE 1 TABLET(25 MG) BY MOUTH EVERY DAY   Tylenol Arthritis Pain 650 mg oral tablet extended release 2019 take 2 tablets (1,300 mg) by oral route every 8 hours swallowing whole with water. Do not break, crush, dissolve and/or chew.   Vitamin B-12 oral  --    Voltaren 1 % topical gel 2020 apply 2 grams to the affected area(s) by topical route 4 times per day   warfarin 2 mg oral tablet 2020 TAKE 1 TABLET(2 MG) BY MOUTH EVERY DAY         Allergy List  Allergen Name Date Reaction Notes   NO KNOWN DRUG ALLERGIES --  --  --        Allergies Reconciled  Family Medical History  Disease Name Relative/Age Notes   Stroke Father/  Mother/   --    Heart Disease  Sibling   Heart Attack (MI)  Sibling   Melanoma  Child         Reproductive History  Menstrual   Menopause Status: Postmenopausal   Pregnancy Summary   Total Pregnancies: 2 Full Term: 2 Premature: 0   Ab Induced: 0 Ab Spontaneous: 0 Ectopics: 0   Multiples: 0 Livin         Social History  Finding Status Start/Stop  "Quantity Notes   Alcohol Never --/-- --  --    Tobacco Former --/-- 1 PPD Quit 1986 Hx smoked for 30yrs         Immunizations  NameDate Admin Mfg Trade Name Lot Number Route Inj VIS Given VIS Publication   Lkztfammw67/13/2020 PMC FLUZONE-HIGH DOSE [id number] IM RD 10/13/2020    Comments: Patient tolerated well left office in stable condition. THO RN   [id number]/30/2018 MSD PNEUMOVAX 23 [id number] IM LD 07/30/2018 04/24/2015   Comments: pt tolerated well and left office in stable condition, [place], MA   Prevnar 1302/07/2017 WAL PREVNAR 13 [id number] IM LD 02/07/2017 11/05/2015   Comments: pt tolerated well and left ofice in stable condition.         Review of Systems  · Constitutional  o Denies  o : fever, fatigue, weight loss, weight gain  · Cardiovascular  o Denies  o : lower extremity edema, claudication, chest pressure, palpitations  · Respiratory  o Denies  o : shortness of breath, wheezing, cough, hemoptysis, dyspnea on exertion  · Gastrointestinal  o Denies  o : nausea, vomiting, diarrhea, constipation, abdominal pain      Vitals  Date Time BP Position Site L\R Cuff Size HR RR TEMP (F) WT  HT  BMI kg/m2 BSA m2 O2 Sat FR L/min FiO2 HC       08/25/2020 02:19 /74 Sitting    90 - R 16 98.5 142lbs 8oz 5'  3\" 25.24 1.7 95 %  21%    09/02/2020 02:30 /70 Sitting    101 - R  98 140lbs 4oz 5'  6\" 22.64 1.72 94 %  21%    12/04/2020 04:42 /64 Sitting    90 - R 18 98.5 140lbs 8oz 5'  6\" 22.68 1.72 95 %  21%          Physical Examination  · Constitutional  o Appearance  o : no acute distress, well-nourished  · Head and Face  o Head  o :   § Inspection  § : atraumatic, normocephalic  · Eyes  o Eyes  o : extraocular movements intact, no scleral icterus, no conjunctival injection  · Ears, Nose, Mouth and Throat  o Ears  o :   § External Ears  § : normal  o Nose  o :   § Intranasal Exam  § : nares patent  o Oral Cavity  o :   § Oral Mucosa  § : moist mucous membranes  · Respiratory  o Respiratory Effort  o : " breathing comfortably, symmetric chest rise  o Auscultation of Lungs  o : clear to asculatation bilaterally, no wheezes, rales, or rhonchii  · Cardiovascular  o Heart  o :   § Auscultation of Heart  § : regular rate and rhythm, no murmurs, rubs, or gallops  o Peripheral Vascular System  o :   § Extremities  § : no edema  · Neurologic  o Mental Status Examination  o :   § Orientation  § : grossly oriented to person, place and time  o Gait and Station  o :   § Gait Screening  § : normal gait  · Psychiatric  o General  o : normal mood and affect  · IMP Knee Injection  o Knee  o : Bilateral knees   o Procedure info  o : Informed Consent obtained. Patient was informed of possible adverse effects including but not limited to bleeding, damage to nerve, tendon or artery, increased blood sugar, and increased blood pressure. They were instructed to call if they have any conerns or questions. Time out performed. Patient placed with knee in flexion at 90 degrees. Site marked inferior and lateral to patella. Betadine was swabbed at injection site per typical technique. 25 ga, 1.5 inch needle injected into bursa, aspiration was performed and then 80mg Kenalog and 1mL 1% Lidocaine without Epi was slowly injected into joint space, fluid was free flowing. Needle was removed, betadine wiped off and band-aid placed to injection site.           Assessment  · Arthritis     V13.4/V13.4  knee injections today  · HTN (hypertension)     401.9/I10  stable  cont current meds  · Hyperlipemia     272.4/E78.5  stable  · Long term (current) use of anticoagulants     V58.61/Z79.01  · Essential hypertension     401.9/I10  · Hyperlipidemia     272.4/E78.5  · Wrist pain     719.43/M25.539  xray  · Knee pain     719.46/M25.569  · DVT (deep venous thrombosis)     453.40/I82.409  cont current meds    Problems Reconciled  Plan  · Orders  o CBC with Auto Diff [facility] ([id number]) - 272.4/E78.5, 401.9/I10 - 12/04/2020  o CMP [facility] ([id number]) - 401.9/I10, 272.4/E78.5 -  12/04/2020  o 2 - Joint Injection; major joint or bursa (eg. shoulder, hip, knee, subacromial bursa) (98524) - V13.4/V13.4 - 12/04/2020   Patient given intraarticular injection to right knee. Patient tolerated well left office in stable condition. 2 ml of kenalog mixed with 1 ml of lidocaine given by ACMD drawn up by JANET SNOW. CH RN   Patient given intra-articular injection left knee. Patient tolerated well left office in stable condition. 2 ml of kenalog mixed with 1 ml of lidocaine given by JANET SNOW drawn up by JANET SNOW. THO RN  o ACO-39: Current medications updated and reviewed (1159F, ) - - 12/04/2020  o PT/INR (55704) - 453.40/I82.409 - 12/04/2020  o Wrist (Right) 3 or more views X-Ray [facility] ([id number]) - 719.43/M25.539 - 12/04/2020  o Knee (Left) [facility] (11865-XM) - 719.46/M25.569 - 12/04/2020  o Knee (Right) [facility] (68052-WR) - 719.46/M25.569 - 12/04/2020  o HILLARY (antinuclear antibody profile) by enzyme immunoassay (97500) - V13.4/V13.4 - 12/04/2020  o Uric Acid Serum [facility] (04769) - V13.4/V13.4 - 12/04/2020  o 4.00 - Kenalog Injection 40mg (-1) - - 12/04/2020   Injection - Kenalog 40 mg; Dose: 80mg; Site: Left Knee; Route: intra-articular; Date: 12/04/2020 18:28:48; Exp: 10/01/2021; Lot: [id number]; Mfg: Quat-E PHARM; TradeName: triamcinolone aceton-0.9% NaCl; Location: [facility]; Administered By: Josie Ragland RN; Comment: Patient tolerated well left office in stable condition. CH RN   Injection - Kenalog 40 mg; Dose: 80mg; Site: Right Knee; Route: intra-articular; Date: 12/04/2020 18:28:48; Exp: 10/01/2021; Lot: [id number]; Mfg: TEVA PHARM; TradeName: triamcinolone aceton-0.9% NaCl; Location: [facility]; Administered By: Josie Ragland RN; Comment: Patient tolerated well left office in stable condition. CH RN  · Medications  o Medications have been Reconciled  o Transition of Care or Provider  Policy  · Instructions  o Advised that cheeses and other sources of dairy fats, animal fats, fast food, and the extras (candy, pastries, pies, doughnuts and cookies) all contain LDL raising nutrients. Advised to increase fruits, vegetables, whole grains, and to monitor portion sizes.   o Patient was educated/instructed on their diagnosis, treatment and medications prior to discharge from the clinic today.            Electronically Signed by: Marquita Cook MD -Author on December 27, 2020 03:31:09 PM

## 2021-05-13 NOTE — PROGRESS NOTES
"   Quick Note      Patient Name: Noelle Nazario   Patient ID: [id number]   Sex: Female   YOB: 1940    Primary Care Provider: Marquita Cook MD    Visit Date: May 11, 2020    Provider: Marquita Cook MD   Location: [facility]   Location Address: [address] [place] [address], [facility] [address]  [place], [place]  [id number]   Location Phone: [phone]          History Of Present Illness  TELEHEALTH TELEPHONE VISIT  Chief Complaint: \"she has been really sore and achey all over\"   Noelle Nazario is a 79 year old /White female who is presenting for evaluation via telehealth telephone visit. Verbal consent obtained before beginning visit.   Provider spent 23 minutes with the patient during telehealth visit.   The following staff were present during this visit: Marquita Cook MD   Past Medical History/Overview of Patient Symptoms     Daughter states that she is really sore all over  she is even stating that the top of her head is sore  even walking and moving around is difficult  she has been in her recliner most of the day  at one point she got stuck in her chair  after she got up and was able to walk around a little    eating well  not coughing  no fever    pt states that her neck is really bothering her  it is coming up the back of her neck  she is noticing     no chest pain  no trouble breathing  no headache             Assessment  · HTN (hypertension)     401.9/I10  · Hyperlipemia     272.4/E78.5  · Muscle ache of extremity     729.1/M79.18  · Fatigue     780.79/R53.83       unsure of etiology  worrisome for rheumatologic process  will have her stop atorvastatin for now  will check labs ASAP  will adjust meds based on lab results  warned her that if symptoms worsen that we want her to go to ER  she and her daughter stated understanding     Problems Reconciled  Plan  · Orders  o CBC with Auto Diff [facility] ([id number]) - 729.1/M79.18, 401.9/I10, 272.4/E78.5 - 05/11/2020  o CMP [facility] (17090) - " 729.1/M79.18, 401.9/I10, 272.4/E78.5 - 2020  o Lipid Panel [facility] (42972) - 729.1/M79.18, 401.9/I10, 272.4/E78.5 - 2020  o TSH and FT4 (28480, 68811, THYII) - 729.1/M79.18, 401.9/I10, 272.4/E78.5 - 2020  o Physician Telephone Evaluation, 21-30 minutes (89494) - - 2020  o CK Total [facility] (13002) - 729.1/M79.18, 401.9/I10, 272.4/E78.5 - 2020  o PT/INR (58496) - 729.1/M79.18, 401.9/I10, 272.4/E78.5 - 2020  o ESR (37096) - 729.1/M79.18, 401.9/I10, 272.4/E78.5 - 2020  o HILLARY (antinuclear antibody profile) by enzyme immunoassay (58979) - 729.1/M79.18, 401.9/I10, 272.4/E78.5, 780.79/R53.83 - 2020  o EBV antibody panel (58469, 84515, 00411) - 729.1/M79.18, 401.9/I10, 272.4/E78.5, 780.79/R53.83 - 2020  o Vitamin B-12 Level (07863) - 729.1/M79.18, 401.9/I10, 272.4/E78.5, 780.79/R53.83 - 2020  o Iron + TIBC ser (29028, 44380) - 729.1/M79.18, 401.9/I10, 272.4/E78.5, 780.79/R53.83 - 2020  · Medications  o Lipitor 20 mg oral tablet   SI MG PO HS   DISP: (90) Tablet with 0 refills  Discontinued on 2020     o Zoloft 25 mg oral tablet   SIG: TAKE 1 TABLET (25 MG) BY ORAL ROUTE ONCE DAILY   DISP: (90) Tablet with 1 refills  Discontinued on 2020     o Medications have been Reconciled  o Transition of Care or Provider Policy  · Instructions  o Plan Of Care:             Electronically Signed by: Marquita Cook MD -Author on May 11, 2020 10:58:39 AM

## 2021-05-13 NOTE — PROGRESS NOTES
"   Progress Note      Patient Name: Noelle Nazario   Patient ID: [id number]   Sex: Female   YOB: 1940    Primary Care Provider: Marquita Cook MD    Visit Date: September 2, 2020    Provider: Marquita Cook MD   Location: [facility]   Location Address: [address] [place] [address], [facility] [address]  [place], [place]  [id number]   Location Phone: [phone]          Chief Complaint  · \"legs and feet are swelling and painful\"  · \"Her hands hurt all the time\"  · \"Left knee is giving out\"  · \"hands are burning on the inside\"  · \"pt and daughter wants to discuss health alert options\"  · \"she is having hair loss\"      History Of Present Illness  Noelle Nazario is a 79 year old /White female who presents for evaluation and treatment of:      Chronic issues  She is continuing to talk to herself at home  I believe she is seeing someone when she does this  However it does not bother her  When asked about it she denies any of it  States she feels well  No chest pain  No trouble breathing  No leg swelling    She continues to have pain in her hands  Reviewed prior work-up which was suggestive of osteoarthritis       Past Medical History  Disease Name Date Onset Notes   Allergies --  --    Arthritis --  --    Blood Diseases --  --    Broken Bones --  --    Cancer --  --    Cataract --  --    Chest pain --  stress by Dr. Medrano in 12/2015 was normal, because of her risk factors he recommened proceeding with cath, however she is reluctant to do this at this time, if chest pain worsens she will consider cath in future   Emphysema --  --    GERD --  --    Hodgkin's Lymphoma --  --    HTN (hypertension) --  --    Hyperlipemia --  --    Long term (current) use of anticoagulants --  --    Night sweats --  --    Pulmonary embolism --  --    Stroke --  --          Past Surgical History  Procedure Name Date Notes   Breast biopsy --  --    Hysterectomy --  --          Medication List  Name Date Started " Instructions   biotin oral  --    Calcium 500 + D oral  --    clopidogrel 75 mg oral tablet 07/20/2020 TAKE 1 TABLET(75 MG) BY MOUTH EVERY DAY   ferrous sulfate 325 mg (65 mg iron) oral tablet 09/02/2020 TAKE 1 TABLET(325 MG) BY MOUTH TWICE DAILY   gabapentin 100 mg oral capsule 09/02/2020 one tab po BID   levothyroxine 25 mcg oral tablet 09/02/2020 TAKE 1 TABLET(25 MCG) BY MOUTH EVERY DAY   lisinopril 20 mg oral tablet 07/20/2020 TAKE 1 TABLET(20 MG) BY MOUTH EVERY DAY   meclizine 25 mg oral tablet 05/02/2019 take 1 tablet by oral route every 6 hours   mirtazapine 30 mg oral tablet 06/22/2020 TAKE 1 TABLET BY MOUTH DAILY   omeprazole 20 mg oral capsule,delayed release(DR/EC) 01/21/2019 take 1 capsule (20 mg) by oral route once daily before a meal   Osteo Bi-Flex 250-200 mg oral tablet 02/07/2017 take 2 tablets by oral route daily for 90 days   prednisone 20 mg oral tablet 09/02/2020 Take 1 tablet by mouth once daily for 5 days   Probiotic oral  take 1 by oral route daily   sertraline 25 mg oral tablet 06/22/2020 TAKE 1 TABLET(25 MG) BY MOUTH EVERY DAY   Tylenol Arthritis Pain 650 mg oral tablet extended release 09/09/2019 take 2 tablets (1,300 mg) by oral route every 8 hours swallowing whole with water. Do not break, crush, dissolve and/or chew.   Vitamin B-12 oral  --    Voltaren 1 % topical gel 07/14/2020 apply 2 grams to the affected area(s) by topical route 4 times per day   warfarin 2 mg oral tablet 07/20/2020 TAKE 1 TABLET(2 MG) BY MOUTH EVERY DAY         Allergy List  Allergen Name Date Reaction Notes   NO KNOWN DRUG ALLERGIES --  --  --        Allergies Reconciled  Family Medical History  Disease Name Relative/Age Notes   Stroke Father/  Mother/   --    Heart Disease  Sibling   Heart Attack (MI)  Sibling   Melanoma  Child         Reproductive History  Menstrual   Menopause Status: Postmenopausal   Pregnancy Summary   Total Pregnancies: 2 Full Term: 2 Premature: 0   Ab Induced: 0 Ab Spontaneous: 0 Ectopics:  "0   Multiples: 0 Livin         Social History  Finding Status Start/Stop Quantity Notes   Alcohol Never --/-- --  --    Tobacco Former --/-- 1 PPD Quit  Hx smoked for 30yrs         Immunizations  NameDate Admin Mfg Trade Name Lot Number Route Inj VIS Given VIS Publication   [id number]/ PMC FLUZONE-HIGH DOSE [id number] [facility] 10/14/2019    Comments: patient tolerated well, left office in stable condition   [id number]/ PMC FLUZONE-HIGH DOSE [id number] IM LD 10/14/2019    Comments: patient tolerated well, left office in stable condition   [id number]/ PMC FLUZONE-HIGH DOSE [id number] IM LD 10/30/2018 2015   Comments: pt tolerated well and left office in stable condition, CAROLINA Reaves   [id number]/30/2018 MSD PNEUMOVAX 23 [id number] [facility] 2018   Comments: pt tolerated well and left office in stable condition, CAROLINA Reaves   Prevnar 1302 WAL PREVNAR 13 [id number] [facility] 2017   Comments: pt tolerated well and left ofice in stable condition.         Review of Systems  · Constitutional  o Denies  o : fever, fatigue, weight loss, weight gain  · Cardiovascular  o Denies  o : lower extremity edema, claudication, chest pressure, palpitations  · Respiratory  o Denies  o : shortness of breath, wheezing, frequent cough, hemoptysis, dyspnea on exertion  · Gastrointestinal  o Denies  o : nausea, vomiting, diarrhea, constipation, abdominal pain      Vitals  Date Time BP Position Site L\R Cuff Size HR RR TEMP (F) WT  HT  BMI kg/m2 BSA m2 O2 Sat HC       2020 01:03 /68 Sitting    106 - R  98.2 145lbs 2oz 5'  6\" 23.42 1.75 96 %    2020 02:19 /74 Sitting    90 - R 16 98.5 142lbs 8oz 5'  3\" 25.24 1.7 95 %    2020 02:30 /70 Sitting    101 - R  98 140lbs 4oz 5'  6\" 22.64 1.72 94 %          Physical Examination  · Constitutional  o Appearance  o : no acute distress, well-nourished  · Head and Face  o Head  o :   § Inspection  § : atraumatic, " normocephalic  · Eyes  o Eyes  o : extraocular movements intact, no scleral icterus, no conjunctival injection  · Ears, Nose, Mouth and Throat  o Ears  o :   § External Ears  § : normal  o Nose  o :   § Intranasal Exam  § : nares patent  o Oral Cavity  o :   § Oral Mucosa  § : moist mucous membranes  · Respiratory  o Respiratory Effort  o : breathing comfortably, symmetric chest rise  o Auscultation of Lungs  o : clear to asculatation bilaterally, no wheezes, rales, or rhonchii  · Cardiovascular  o Heart  o :   § Auscultation of Heart  § : regular rate and rhythm, no murmurs, rubs, or gallops  o Peripheral Vascular System  o :   § Extremities  § : no edema  · Neurologic  o Mental Status Examination  o :   § Orientation  § : grossly oriented to person, place and time  o Gait and Station  o :   § Gait Screening  § : normal gait  · Psychiatric  o General  o : normal mood and affect     Slight swelling of fingers           Results  · In-Office Procedures  o Lab procedure  § IOP - Urine Drug Screen In-House [facility] ([id number])   § Amphetamines Ur Ql: Negative   § Barbiturates Ur Ql: Negative   § Buprenorphine+Nor Ur Ql Scn: Negative   § Benzodiaz Ur Ql: Negative   § Cocaine Ur Ql: Negative   § Methadone Ur Ql: Negative   § Methamphet Ur Ql: Negative   § MDMA Ur Ql Scn: Negative   § Opiates Ur Ql: Negative   § Oxycodone Ur Ql: Negative   § PCP Ur Ql: Negative   § THC Ur Ql: Negative   § Temp in Range?: Within/Acceptable   § Control Seen?: Yes       Assessment  · HTN (hypertension)     401.9/I10  · Hyperlipemia     272.4/E78.5  · Osteoarthritis     715.90/M19.90  Unfortunately unable to do anti-inflammatories due to other medication she is on  Therefore will treat with prednisone  · Lymphoma     202.80/C85.90  · Dementia     294.20/F03.90       Had a long discussion about her dementia and what this could mean as well as how it can progress moving forward    Will try gabapentin for her hand and foot pain as well    Otherwise  chronic issues are stable  Will check labs and continue to monitor     Problems Reconciled  Plan  · Orders  o ACO-39: Current medications updated and reviewed () - - 09/02/2020  · Medications  o gabapentin 100 mg oral capsule   SIG: one tab po BID   DISP: (60) capsules with 2 refills  Prescribed on 09/02/2020     o ferrous sulfate 325 mg (65 mg iron) oral tablet   SIG: TAKE 1 TABLET(325 MG) BY MOUTH TWICE DAILY   DISP: (180) Tablet with 0 refills  Refilled on 09/02/2020     o levothyroxine 25 mcg oral tablet   SIG: TAKE 1 TABLET(25 MCG) BY MOUTH EVERY DAY   DISP: (90) Tablet with 0 refills  Refilled on 09/02/2020     o Coumadin 2 mg oral tablet   SIG: take 1 tablet (2 mg) by oral route once daily   DISP: (90) tablets with 0 refills  Discontinued on 09/02/2020     o Medications have been Reconciled  o Transition of Care or Provider Policy  · Instructions  o Patient was educated/instructed on their diagnosis, treatment and medications prior to discharge from the clinic today.  · Disposition  o 3 Month Follow Up            Electronically Signed by: Marquita Cook MD -Author on September 12, 2020 10:37:16 PM

## 2021-05-13 NOTE — PROGRESS NOTES
Progress Note      Patient Name: Noelle Nazario   Patient ID: [id number]   Sex: Female   YOB: 1940    Primary Care Provider: Marquita Cook MD    Visit Date: August 25, 2020    Provider: JOSIAS Johnson   Location: [facility]   Location Address: [address] [place] [address], [address]  [place], [place]  [id number]   Location Phone: [phone]          Chief Complaint  · Bilateral ankle and feet swollen, worse on right side      History Of Present Illness  Noelle Nazario is a 79 year old /White female who presents for evaluation and treatment of:      Acute visit    Patient reports that she has had chronic swelling in her lower legs for many months.  Patient reports that it continues to get worse.  Patient feels that when she goes to bed and props her feet up the swelling does get better and then in the morning it has gone down.  As the day progresses the ankle swelling gets worse.  Patient does have some swelling in her right hand and minimal amount of left hand patient reports which is due to arthritis.  Patient has been taking steroids recently for her arthritis.  Propping her feet up does help with swelling.  No redness warmth fever nausea vomiting.    Patient denies chest pain or worsening shortness of breath.  Patient does have COPD and has baseline shortness of breath.       Past Medical History  Disease Name Date Onset Notes   Allergies --  --    Arthritis --  --    Blood Diseases --  --    Broken Bones --  --    Cancer --  --    Cataract --  --    Chest pain --  stress by Dr. Medrano in 12/2015 was normal, because of her risk factors he recommened proceeding with cath, however she is reluctant to do this at this time, if chest pain worsens she will consider cath in future   Emphysema --  --    GERD --  --    Hodgkin's Lymphoma --  --    HTN (hypertension) --  --    Hyperlipemia --  --    Long term (current) use of anticoagulants --  --    Night sweats --  --     Pulmonary embolism --  --    Stroke --  --          Past Surgical History  Procedure Name Date Notes   Breast biopsy --  --    Hysterectomy --  --          Medication List  Name Date Started Instructions   clopidogrel 75 mg oral tablet 07/20/2020 TAKE 1 TABLET(75 MG) BY MOUTH EVERY DAY   Coumadin 2 mg oral tablet 04/29/2020 take 1 tablet (2 mg) by oral route once daily   ferrous sulfate 325 mg (65 mg iron) oral tablet 08/10/2020 TAKE 1 TABLET(325 MG) BY MOUTH TWICE DAILY   levothyroxine 25 mcg oral tablet 06/22/2020 TAKE 1 TABLET(25 MCG) BY MOUTH EVERY DAY   lisinopril 20 mg oral tablet 07/20/2020 TAKE 1 TABLET(20 MG) BY MOUTH EVERY DAY   meclizine 25 mg oral tablet 05/02/2019 take 1 tablet by oral route every 6 hours   Medrol (Qasim) 4 mg oral tablets,dose pack 07/16/2020 take by oral route as directed per package instructions   mirtazapine 30 mg oral tablet 06/22/2020 TAKE 1 TABLET BY MOUTH DAILY   omeprazole 20 mg oral capsule,delayed release(DR/EC) 01/21/2019 take 1 capsule (20 mg) by oral route once daily before a meal   Osteo Bi-Flex 250-200 mg oral tablet 02/07/2017 take 2 tablets by oral route daily for 90 days   prednisone 20 mg oral tablet 08/07/2020 take 1 tablet (20 mg) by oral route once daily for 5 days   sertraline 25 mg oral tablet 06/22/2020 TAKE 1 TABLET(25 MG) BY MOUTH EVERY DAY   Tylenol Arthritis Pain 650 mg oral tablet extended release 09/09/2019 take 2 tablets (1,300 mg) by oral route every 8 hours swallowing whole with water. Do not break, crush, dissolve and/or chew.   Vitamin B-12 oral  --    Voltaren 1 % topical gel 07/14/2020 apply 2 grams to the affected area(s) by topical route 4 times per day   warfarin 2 mg oral tablet 07/20/2020 TAKE 1 TABLET(2 MG) BY MOUTH EVERY DAY         Allergy List  Allergen Name Date Reaction Notes   NO KNOWN DRUG ALLERGIES --  --  --        Allergies Reconciled  Family Medical History  Disease Name Relative/Age Notes   Stroke Father/  Mother/   --    Heart  "Disease  Sibling   Heart Attack (MI)  Sibling   Melanoma  Child         Reproductive History  Menstrual   Menopause Status: Postmenopausal   Pregnancy Summary   Total Pregnancies: 2 Full Term: 2 Premature: 0   Ab Induced: 0 Ab Spontaneous: 0 Ectopics: 0   Multiples: 0 Livin         Social History  Finding Status Start/Stop Quantity Notes   Alcohol Never --/-- --  --    Tobacco Former --/-- 1 PPD Quit  Hx smoked for 30yrs         Immunizations  NameDate Admin Mfg Trade Name Lot Number Route Inj VIS Given VIS Publication   [id number]/ [facility] FLUZONE-HIGH DOSE [id number] [facility] 10/14/2019    Comments: patient tolerated well, left office in stable condition   Uyvglywvi87/ PMC FLUZONE-HIGH DOSE [id number] IM LD 10/14/2019    Comments: patient tolerated well, left office in stable condition   [id number]/ PMC FLUZONE-HIGH DOSE [id number] [facility] 10/30/2018 2015   Comments: pt tolerated well and left office in stable condition, CAROLINA Reaves   Exnmutbad5142/30/2018 MSD PNEUMOVAX 23 [id number] [facility] 2018   Comments: pt tolerated well and left office in stable condition, CAROLINA Reaves   Prevnar 1302 WAL PREVNAR 13 [id number]  LD 2017   Comments: pt tolerated well and left ofice in stable condition.         Review of Systems  · Constitutional  o Denies  o : fever, fatigue, weight loss, weight gain  · Cardiovascular  o Admits  o : lower extremity edema  o Denies  o : claudication, chest pressure, palpitations  · Respiratory  o Denies  o : shortness of breath, wheezing, frequent cough, hemoptysis, dyspnea on exertion  · Gastrointestinal  o Denies  o : nausea, vomiting, diarrhea, constipation, abdominal pain      Vitals  Date Time BP Position Site L\R Cuff Size HR RR TEMP (F) WT  HT  BMI kg/m2 BSA m2 O2 Sat HC       2020 04:37 /64 Sitting    83 - R  97.5 143lbs 0oz 5'  6\" 23.08 1.74 96 %    2020 01:03 /68 Sitting    106 - R  98.2 145lbs 2oz 5'  6\" 23.42 " "1.75 96 %    08/25/2020 02:19 /74 Sitting    90 - R 16 98.5 142lbs 8oz 5'  3\" 25.24 1.7 95 %          Physical Examination  · Constitutional  o Appearance  o : no acute distress, well-nourished  · Head and Face  o Head  o :   § Inspection  § : atraumatic, normocephalic  · Eyes  o Eyes  o : extraocular movements intact, no scleral icterus, no conjunctival injection  · Ears, Nose, Mouth and Throat  o Ears  o :   § External Ears  § : normal  § Otoscopic Examination  § : tympanic membrane appearance within normal limits bilaterally  o Nose  o :   § Intranasal Exam  § : nares patent  o Oral Cavity  o :   § Oral Mucosa  § : moist mucous membranes  o Throat  o :   § Oropharynx  § : no inflammation or lesions present, tonsils within normal limits  · Respiratory  o Respiratory Effort  o : Minimal shortness of breath, symmetric chest rise  o Auscultation of Lungs  o : clear to asculatation bilaterally, no wheezes, rales, or rhonchii  · Cardiovascular  o Heart  o :   § Auscultation of Heart  § : regular rate and rhythm, no murmurs, rubs, or gallops  o Peripheral Vascular System  o :   § Extremities  § : lower extremity edema without pitting.  · Gastrointestinal  o Abdominal Examination  o :   § Abdomen  § : bowel sounds present, non-distended, non-tender  · Lymphatic  o Neck  o : no lymphadenopathy present  o Supraclavicular Nodes  o : no supraclavicular nodes  · Skin and Subcutaneous Tissue  o General Inspection  o : no lesions present, no areas of discoloration, skin turgor normal  · Neurologic  o Mental Status Examination  o :   § Orientation  § : grossly oriented to person, place and time  o Gait and Station  o :   § Gait Screening  § : normal gait  · Psychiatric  o General  o : normal mood and affect          Assessment  · Arthritis     V13.4/V13.4  Arthritis is causing some inflammation in her hands.  · HTN (hypertension)     401.9/I10  We discussed several different etiologies that could be causing the swelling. " We discussed cardiac, vascular, arthritis, and medications plus more. Very well controlled. Discussed the possibility of adding a diuretic if labs come back normal.  · Lower extremity edema     782.3/R60.0  We will start with labs related to the lower extremity edema to rule out things like cardiac involvement.     Problems Reconciled  Plan  · Orders  o CBC with Auto Diff [facility] ([id number]) - 401.9/I10, V13.4/V13.4, 782.3/R60.0 - 08/25/2020  o CMP [facility] ([id number]) - 401.9/I10, V13.4/V13.4, 782.3/R60.0 - 08/25/2020  o TSH [facility] ([id number]) - 401.9/I10, V13.4/V13.4, 782.3/R60.0 - 08/25/2020  o ACO-39: Current medications updated and reviewed () - - 08/25/2020  o BNP (brain natriuretic peptide measurement) ([id number]) - 401.9/I10, V13.4/V13.4, 782.3/R60.0 - 08/25/2020  · Medications  o Medications have been Reconciled  o Transition of Care or Provider Policy  · Instructions  o Take all medications as prescribed/directed.  o Patient was educated/instructed on their diagnosis, treatment and medications prior to discharge from the clinic today.  · Disposition  o Call or Return if symptoms worsen or persist.  o Follow Up as Scheduled  o Dr. Cook patient            Electronically Signed by: JOSIAS Johnson -Author on August 25, 2020 04:44:08 PM

## 2021-05-13 NOTE — PROGRESS NOTES
"   Progress Note      Patient Name: Noelle Nazario   Patient ID: [id number]   Sex: Female   YOB: 1940    Primary Care Provider: Marquita Cook MD    Visit Date: December 15, 2020    Provider: Marquita Cook MD   Location: [facility]   Location Address: [address] [place] [address], [facility] [address]  [place], [place]  [id number]   Location Phone: [phone]          Chief Complaint  · \"She is following up from the ER-she was diagnosed with a UTI\"  · \"she is currently taking Augmentin\"      History Of Present Illness  Noelle Nazario is a 79 year old /White female who presents for evaluation and treatment of:      acute metnal status changes    knees are better than they were the last time she was here  however since the UTI and fall she does have a little knee    fell out of bed, she hurt her left hand and left toe  they are bruised but moving well and seem to healing well    they are using a monitor to watch when she is up         Past Medical History  Disease Name Date Onset Notes   Allergies --  --    Arthritis --  --    Blood Diseases --  --    Broken Bones --  --    Cancer --  --    Cataract --  --    Chest pain --  stress by Dr. Medrano in 12/2015 was normal, because of her risk factors he recommened proceeding with cath, however she is reluctant to do this at this time, if chest pain worsens she will consider cath in future   Emphysema --  --    GERD --  --    Hodgkin's Lymphoma --  --    HTN (hypertension) --  --    Hyperlipemia --  --    Long term (current) use of anticoagulants --  --    Night sweats --  --    Pulmonary embolism --  --    Stroke --  --          Past Surgical History  Procedure Name Date Notes   Breast biopsy --  --    Hysterectomy --  --          Medication List  Name Date Started Instructions   biotin oral  --    Calcium 500 + D oral  --    Cipro 500 mg oral tablet 12/06/2020 take 1 tablet (500 mg) by oral route 2 times per day for 7 days   clopidogrel 75 mg " oral tablet 2020 TAKE 1 TABLET(75 MG) BY MOUTH EVERY DAY   ferrous sulfate 325 mg (65 mg iron) oral tablet 2020 TAKE 1 TABLET(325 MG) BY MOUTH TWICE DAILY   gabapentin 300 mg oral capsule 12/15/2020 Take 1 tablet by oral route twice daily   levothyroxine 25 mcg oral tablet 2020 TAKE 1 TABLET(25 MCG) BY MOUTH EVERY DAY   lisinopril 20 mg oral tablet 2020 TAKE 1 TABLET(20 MG) BY MOUTH EVERY DAY   mirtazapine 30 mg oral tablet 2020 TAKE 1 TABLET BY MOUTH DAILY   Multivitamin 50 Plus oral tablet  take 1 tablet by oral route every morning   omeprazole 20 mg oral capsule,delayed release(DR/EC) 2019 take 1 capsule (20 mg) by oral route once daily before a meal   Osteo Bi-Flex 250-200 mg oral tablet 2020 take 1 tablet by oral route daily for 90 days   Probiotic oral  take 1 by oral route daily   sertraline 25 mg oral tablet 2020 TAKE 1 TABLET(25 MG) BY MOUTH EVERY DAY   Tylenol Arthritis Pain 650 mg oral tablet extended release 2019 take 2 tablets (1,300 mg) by oral route every 8 hours swallowing whole with water. Do not break, crush, dissolve and/or chew.   Vitamin B-12 oral  --    Voltaren 1 % topical gel 2020 apply 2 grams to the affected area(s) by topical route 4 times per day   warfarin 2 mg oral tablet 2020 TAKE 1 TABLET(2 MG) BY MOUTH EVERY DAY         Allergy List  Allergen Name Date Reaction Notes   NO KNOWN DRUG ALLERGIES --  --  --        Allergies Reconciled  Family Medical History  Disease Name Relative/Age Notes   Stroke Father/  Mother/   --    Heart Disease  Sibling   Heart Attack (MI)  Sibling   Melanoma  Child         Reproductive History  Menstrual   Menopause Status: Postmenopausal   Pregnancy Summary   Total Pregnancies: 2 Full Term: 2 Premature: 0   Ab Induced: 0 Ab Spontaneous: 0 Ectopics: 0   Multiples: 0 Livin         Social History  Finding Status Start/Stop Quantity Notes   Alcohol Never --/-- --  --    Tobacco Former --/-- 1  "PPD Quit 1986 Hx smoked for 30yrs         Immunizations  NameDate Admin Mfg Trade Name Lot Number Route Inj VIS Given VIS Publication   Usybudowq53/13/2020 PMC FLUZONE-HIGH DOSE [id number] IM RD 10/13/2020    Comments: Patient tolerated well left office in stable condition. THO RN   Yuuxauxzt7106/30/2018 MSD PNEUMOVAX 23 [id number] IM LD 07/30/2018 04/24/2015   Comments: pt tolerated well and left office in stable condition, CAROLINA Reaves   Prevnar 1302/07/2017 WAL PREVNAR 13 [id number] IM LD 02/07/2017 11/05/2015   Comments: pt tolerated well and left ofice in stable condition.         Review of Systems  · Constitutional  o Denies  o : fever, fatigue, weight loss, weight gain  · Cardiovascular  o Denies  o : lower extremity edema, claudication, chest pressure, palpitations  · Respiratory  o Denies  o : shortness of breath, wheezing, frequent cough, hemoptysis, dyspnea on exertion  · Gastrointestinal  o Denies  o : nausea, vomiting, diarrhea, constipation, abdominal pain      Vitals  Date Time BP Position Site L\R Cuff Size HR RR TEMP (F) WT  HT  BMI kg/m2 BSA m2 O2 Sat FR L/min FiO2 HC       08/25/2020 02:19 /74 Sitting    90 - R 16 98.5 142lbs 8oz 5'  3\" 25.24 1.7 95 %  21%    09/02/2020 02:30 /70 Sitting    101 - R  98 140lbs 4oz 5'  6\" 22.64 1.72 94 %  21%    12/04/2020 04:42 /64 Sitting    90 - R 18 98.5 140lbs 8oz 5'  6\" 22.68 1.72 95 %  21%    12/15/2020 10:32 /82 Sitting    91 - R  97.6 139lbs 4oz 5'  6\" 22.48 1.72 95 %  21%          Physical Examination  · Constitutional  o Appearance  o : no acute distress, well-nourished  · Head and Face  o Head  o :   § Inspection  § : atraumatic, normocephalic  · Eyes  o Eyes  o : extraocular movements intact, no scleral icterus, no conjunctival injection  · Respiratory  o Respiratory Effort  o : breathing comfortably, symmetric chest rise  o Auscultation of Lungs  o : clear to asculatation bilaterally, no wheezes, rales, or " rhonchii  · Cardiovascular  o Heart  o :   § Auscultation of Heart  § : regular rate and rhythm, no murmurs, rubs, or gallops  o Peripheral Vascular System  o :   § Extremities  § : no edema  · Neurologic  o Mental Status Examination  o :   § Orientation  § : grossly oriented to person, place and time  o Gait and Station  o :   § Gait Screening  § : normal gait  · Psychiatric  o General  o : normal mood and affect              Assessment  · Urinary tract infection     599.0/N39.0       Rocephin today and again later this week  repeat urine in 2 weeks  review meds to consider reasons for memory change     Problems Reconciled  Plan  · Orders  o ACO-39: Current medications updated and reviewed (1159F, ) - - 12/15/2020  o Urinalysis with Reflex Microscopy if abnormal ([facility]) (99056) - 599.0/N39.0 - 12/15/2020  o Urine Culture [facility] (70054) - 599.0/N39.0 - 12/15/2020  o IM/SQ - Injection Fee [facility] (80314) - - 12/15/2020  o 4.00 - Rocephin Injection 1 Gram (1000mg) (-4) - 599.0/N39.0 - 12/15/2020   Injection - Rocephin 1 Gram; Dose: 2.1; Site: Right Gluteus; Route: intramuscular; Date: 12/15/2020 11:44:48; Exp: 04/01/2021; Lot: [id number]; Mfg: N/A; TradeName: N/A; Location: [facility]; Administered By: Nora Galdamez MA; Comment: pt tolerated well and left office in stable condition, CAROLINA Reaves  · Medications  o Medications have been Reconciled  o Transition of Care or Provider Policy  · Instructions  o Patient was educated/instructed on their diagnosis, treatment and medications prior to discharge from the clinic today.            Electronically Signed by: Marquita Cook MD -Author on December 27, 2020 10:01:44 PM

## 2021-05-13 NOTE — PROGRESS NOTES
"   Progress Note      Patient Name: Noelle Nazario   Patient ID: [id number]   Sex: Female   YOB: 1940    Primary Care Provider: Marquita Cook MD    Visit Date: July 9, 2020    Provider: JOSIAS Johnson   Location: [facility]   Location Address: [address] [place] [address], [facility] 3  [place], [place]  [id number]   Location Phone: [phone]          Chief Complaint  · \"Her hand is really swollen and sore.\"      History Of Present Illness  Noelle Nazario is a 79 year old /White female who presents for evaluation and treatment of:      Acute visit     Patient reports right hand pain and swelling that patient believes is related to arthritis.  Patient states that it hurts along her knuckles with swelling.  Patient denies redness warmth or fever.  No known injury.  Patient is right-handed.       Past Medical History  Disease Name Date Onset Notes   Allergies --  --    Arthritis --  --    Blood Diseases --  --    Broken Bones --  --    Cancer --  --    Cataract --  --    Chest pain --  stress by Dr. Medrano in 12/2015 was normal, because of her risk factors he recommened proceeding with cath, however she is reluctant to do this at this time, if chest pain worsens she will consider cath in future   Emphysema --  --    GERD --  --    Hodgkin's Lymphoma --  --    HTN (hypertension) --  --    Hyperlipemia --  --    Night sweats --  --    Pulmonary embolism --  --    Stroke --  --          Past Surgical History  Procedure Name Date Notes   Breast biopsy --  --    Hysterectomy --  --          Medication List  Name Date Started Instructions   clopidogrel 75 mg oral tablet 04/10/2020 TAKE 1 TABLET(75 MG) BY MOUTH EVERY DAY   Coumadin 2 mg oral tablet 04/29/2020 take 1 tablet (2 mg) by oral route once daily   ferrous sulfate 325 mg (65 mg iron) oral tablet 05/12/2020 take 1 tablet (325 mg) by oral route 2 times per day   levothyroxine 25 mcg oral tablet 06/22/2020 TAKE 1 TABLET(25 MCG) BY " MOUTH EVERY DAY   lisinopril 20 mg oral tablet 04/10/2020 TAKE 1 TABLET (20 MG) BY ORAL ROUTE ONCE DAILY   meclizine 25 mg oral tablet 2019 take 1 tablet by oral route every 6 hours   mirtazapine 30 mg oral tablet 2020 TAKE 1 TABLET BY MOUTH DAILY   omeprazole 20 mg oral capsule,delayed release(DR/EC) 2019 take 1 capsule (20 mg) by oral route once daily before a meal   Osteo Bi-Flex 250-200 mg oral tablet 2017 take 2 tablets by oral route daily for 90 days   sertraline 25 mg oral tablet 2020 TAKE 1 TABLET(25 MG) BY MOUTH EVERY DAY   Tylenol Arthritis Pain 650 mg oral tablet extended release 2019 take 2 tablets (1,300 mg) by oral route every 8 hours swallowing whole with water. Do not break, crush, dissolve and/or chew.   Vitamin B-12 oral  --    Voltaren 1 % topical gel 2020 apply 2 grams to the affected area(s) by topical route 4 times per day         Allergy List  Allergen Name Date Reaction Notes   NO KNOWN DRUG ALLERGIES --  --  --          Family Medical History  Disease Name Relative/Age Notes   Stroke Father/  Mother/   --    Heart Disease  Sibling   Heart Attack (MI)  Sibling   Melanoma  Child         Reproductive History  Menstrual   Menopause Status: Postmenopausal   Pregnancy Summary   Total Pregnancies: 2 Full Term: 2 Premature: 0   Ab Induced: 0 Ab Spontaneous: 0 Ectopics: 0   Multiples: 0 Livin         Social History  Finding Status Start/Stop Quantity Notes   Alcohol Never --/-- --  --    Tobacco Former --/-- 1 PPD Quit  Hx smoked for 30yrs         Immunizations  NameDate Admin Mfg Trade Name Lot Number Route Inj VIS Given VIS Publication   [id number]/ [facility] FLUZONE-HIGH DOSE [id number]  LD 10/14/2019    Comments: patient tolerated well, left office in stable condition   [id number]/ [facility] FLUZONE-HIGH DOSE [id number]  LD 10/14/2019    Comments: patient tolerated well, left office in stable condition   Yvwvjsnvd61/ [facility] FLUZONE-HIGH DOSE  [id number] [facility] 10/30/2018 08/07/2015   Comments: pt tolerated well and left office in stable condition, CAROLINA Reaves   Ziyhaenoo1055/30/2018 MSD PNEUMOVAX 23 [id number] [facility] 07/30/2018 04/24/2015   Comments: pt tolerated well and left office in stable condition, CAROLINA Reaves   Prevnar 1302/07/2017 WAL PREVNAR 13 [id number] [facility] 02/07/2017 11/05/2015   Comments: pt tolerated well and left ofice in stable condition.         Review of Systems  · Constitutional  o Denies  o : fever, fatigue, weight loss, weight gain  · Cardiovascular  o Denies  o : lower extremity edema, claudication, chest pressure, palpitations  · Respiratory  o Denies  o : shortness of breath, wheezing, frequent cough, hemoptysis, dyspnea on exertion  · Gastrointestinal  o Denies  o : nausea, vomiting, diarrhea, constipation, abdominal pain  · Musculoskeletal  o Admits  o : joint pain, hand pain      Vitals  Date Time BP Position Site L\R Cuff Size HR RR TEMP (F) WT  HT  BMI kg/m2 BSA m2 O2 Sat        12/18/2019 11:00 /60 Sitting    101 - R  97.5 142lbs 4oz 5'  6\" 22.96 1.73 96 %    03/09/2020 04:37 /64 Sitting    83 - R  97.5 143lbs 0oz 5'  6\" 23.08 1.74 96 %    07/09/2020 01:03 /68 Sitting    106 - R  98.2 145lbs 2oz 5'  6\" 23.42 1.75 96 %          Physical Examination  · Constitutional  o Appearance  o : no acute distress, well-nourished  · Head and Face  o Head  o :   § Inspection  § : atraumatic, normocephalic  · Ears, Nose, Mouth and Throat  o Ears  o :   § External Ears  § : normal  o Nose  o :   § Intranasal Exam  § : nares patent  o Oral Cavity  o :   § Oral Mucosa  § : moist mucous membranes  o Throat  o :   § Oropharynx  § : no inflammation or lesions present, tonsils within normal limits  · Respiratory  o Respiratory Effort  o : breathing comfortably, symmetric chest rise  o Auscultation of Lungs  o : clear to asculatation bilaterally, no wheezes, rales, or rhonchii  · Cardiovascular  o Heart  o :   § Auscultation of Heart  § : " regular rate and rhythm, no murmurs, rubs, or gallops  o Peripheral Vascular System  o :   § Extremities  § : no edema  · Lymphatic  o Neck  o : no lymphadenopathy present  o Supraclavicular Nodes  o : no supraclavicular nodes  · Skin and Subcutaneous Tissue  o General Inspection  o : no lesions present, no areas of discoloration, skin turgor normal  · Neurologic  o Mental Status Examination  o :   § Orientation  § : grossly oriented to person, place and time  o Gait and Station  o :   § Gait Screening  § : normal gait  · Right Hand  o Inspection  o : no redness, no abrasions, no lacerations  o Palpation  o : tender to palpation along the MCP joints with the middle finger being the most tender.   o Range of Motion  o : PIP abnormal motion, DIP abnormal motion, MCP abnormal motion  o Neurovascular  o : capillary refill less than 5 seconds in all five nailbeds          Assessment  · Arthritis     V13.4/V13.4  · Osteoarthritis     715.90/M19.90  X-ray of the right hand performed in the office displaying osteoarthritis throughout. Educated the family to continue Voltaren gel and Tylenol arthritis as well as ice and not overusing the hand. Educated that if the pain continues we could try a compound cream or even a different pain medication.  · Right hand pain     729.5/M79.641    Problems Reconciled  Plan  · Orders  o ACO-39: Current medications updated and reviewed () - - 07/09/2020  o Xray hand right 2 views [facility] (30115-RE) - 729.5/M79.641 - 07/09/2020  · Medications  o Medications have been Reconciled  o Transition of Care or Provider Policy  · Instructions  o Tylenol may be used as needed every 4-6 hours for pain.  o Patient was educated/instructed on their diagnosis, treatment and medications prior to discharge from the clinic today.  o Call the office with any concerns or questions.  · Disposition  o Call or Return if symptoms worsen or persist.  o As Needed for Follow Up  o Follow Up as Scheduled  o X-rays  here            Electronically Signed by: Becca Marcos, APRN -Author on July 9, 2020 03:52:35 PM

## 2021-05-13 NOTE — PROGRESS NOTES
"   Progress Note      Patient Name: Noelle Nazario   Patient ID: [id number]   Sex: Female   YOB: 1940    Primary Care Provider: Marquita Cook MD    Visit Date: June 11, 2020    Provider: Marquita Cook MD   Location: [facility]   Location Address: [address] [place] [address] [address]  [place], [place]  [id number]   Location Phone: [phone]          Chief Complaint  · \"i wanted to ask her about COPD\"  · \"we're doing a follow up\"      History Of Present Illness  TELEHEALTH TELEPHONE VISIT  Chief Complaint: \"follow up for headache\"   Noelle Nazario is a 79 year old /White female who is presenting for evaluation via telehealth telephone visit. Verbal consent obtained before beginning visit.   Provider spent 25 minutes with the patient during the telehealth visit.   The following staff were present during this visit: Marquita Cook MD   Past Medical History/ Overview of Patient Symptoms     states that her headache is doing better  mostly when she is sitting down is when she notices the headache  the tingling sensation around her ears is totally gone  her head doesn't hurt her when she is up and walking around    she isn't sure if the steroids made it better  she is getting some swelling in cheeks but that is starting to help her feel better    she is still dealing with weakness  states that she just feels like she doesn't have as much energy    have discussed her case with vascular surgery       Past Medical History  Disease Name Date Onset Notes   Allergies --  --    Arthritis --  --    Blood Diseases --  --    Broken Bones --  --    Cancer --  --    Cataract --  --    Chest pain --  stress by Dr. Medrano in 12/2015 was normal, because of her risk factors he recommened proceeding with cath, however she is reluctant to do this at this time, if chest pain worsens she will consider cath in future   Emphysema --  --    GERD --  --    Hodgkin's Lymphoma --  --    HTN (hypertension) " --  --    Hyperlipemia --  --    Night sweats --  --    Pulmonary embolism --  --    Stroke --  --          Past Surgical History  Procedure Name Date Notes   Breast biopsy --  --    Hysterectomy --  --          Medication List  Name Date Started Instructions   clopidogrel 75 mg oral tablet 04/10/2020 TAKE 1 TABLET(75 MG) BY MOUTH EVERY DAY   Coumadin 2 mg oral tablet 2020 take 1 tablet (2 mg) by oral route once daily   ferrous sulfate 325 mg (65 mg iron) oral tablet 2020 take 1 tablet (325 mg) by oral route 2 times per day   levothyroxine 25 mcg oral tablet 2020 TAKE 1 TABLET(25 MCG) BY MOUTH EVERY DAY   lisinopril 20 mg oral tablet 04/10/2020 TAKE 1 TABLET (20 MG) BY ORAL ROUTE ONCE DAILY   meclizine 25 mg oral tablet 2019 take 1 tablet by oral route every 6 hours   mirtazapine 30 mg oral tablet 2019 TAKE 1 TABLET BY ORAL ROUTE DAILY FOR 90 DAYS   omeprazole 20 mg oral capsule,delayed release(DR/EC) 2019 take 1 capsule (20 mg) by oral route once daily before a meal   Osteo Bi-Flex 250-200 mg oral tablet 2017 take 2 tablets by oral route daily for 90 days   Tylenol Arthritis Pain 650 mg oral tablet extended release 2019 take 2 tablets (1,300 mg) by oral route every 8 hours swallowing whole with water. Do not break, crush, dissolve and/or chew.   Vitamin B-12 oral  --          Allergy List  Allergen Name Date Reaction Notes   NO KNOWN DRUG ALLERGIES --  --  --        Allergies Reconciled  Family Medical History  Disease Name Relative/Age Notes   Stroke Father/  Mother/   --    Heart Disease  Sibling   Heart Attack (MI)  Sibling   Melanoma  Child         Reproductive History  Menstrual   Menopause Status: Postmenopausal   Pregnancy Summary   Total Pregnancies: 2 Full Term: 2 Premature: 0   Ab Induced: 0 Ab Spontaneous: 0 Ectopics: 0   Multiples: 0 Livin         Social History  Finding Status Start/Stop Quantity Notes   Alcohol Never --/-- --  --    Tobacco Former  --/-- 1 PPD Quit 1986 Hx smoked for 30yrs         Immunizations  NameDate Admin Mfg Trade Name Lot Number Route Inj VIS Given VIS Publication   Eihzlwogo00/14/2019 PMC FLUZONE-HIGH DOSE [id number] IM LD 10/14/2019    Comments: patient tolerated well, left office in stable condition   Knvlehtba91/14/2019 PMC FLUZONE-HIGH DOSE [id number] IM LD 10/14/2019    Comments: patient tolerated well, left office in stable condition   Oxagvhseb81/30/2018 PMC FLUZONE-HIGH DOSE [id number] IM LD 10/30/2018 08/07/2015   Comments: pt tolerated well and left office in stable condition, CAROLINA Reaves   Znfygqdmo9116/30/2018 MSD PNEUMOVAX 23 [id number] IM LD 07/30/2018 04/24/2015   Comments: pt tolerated well and left office in stable condition, CAROLINA Reaves   Prevnar 1302/07/2017 WAL PREVNAR 13 [id number] IM LD 02/07/2017 11/05/2015   Comments: pt tolerated well and left ofice in stable condition.         Physical Examination                Assessment  · HTN (hypertension)     401.9/I10  · Hyperlipemia     272.4/E78.5  · Iron deficiency     280.9/E61.1  · Fatigue     780.79/R53.83  · Vitamin B12 deficiency     266.2/E53.8  · Pulmonary embolism     415.19/I26.99  · Hypothyroidism     244.9/E03.9       will check labs  adjust meds as needed    cont current meds    she will monitor for worsening symptoms    she is doing significantly better  if her symptoms worsen at all she will let me know  can restart or increase prednisone if needed     Problems Reconciled  Plan  · Orders  o CBC with Auto Diff [facility] (68175) - 401.9/I10, 272.4/E78.5, 280.9/E61.1 - 06/11/2020  o CMP [facility] (10371) - 401.9/I10, 272.4/E78.5, 280.9/E61.1 - 06/11/2020  o Thyroid Profile (63392, 19866, THYII) - 244.9/E03.9 - 06/11/2020  o Physician Telephone Evaluation, 21-30 minutes (67472) - - 06/11/2020  o Iron + TIBC ser (42578, 38313) - 401.9/I10, 280.9/E61.1 - 06/11/2020  o ESR (57117) - 401.9/I10, 272.4/E78.5 - 06/11/2020  · Medications  o Medications have been Reconciled  o Transition of Care  or Provider Policy  · Instructions  o Plan Of Care:             Electronically Signed by: Marquita Cook MD -Author on June 18, 2020 12:00:03 AM

## 2021-05-14 NOTE — PROGRESS NOTES
"   Progress Note      Patient Name: Noelle Nazario   Patient ID: [id number]   Sex: Female   YOB: 1940    Primary Care Provider: Marquita Cook MD    Visit Date: April 14, 2021    Provider: Marquita Cook MD   Location: [facility] [place] [facility]   Location Address: [address] [place] [address], [address]  [place], [place]  [id number]   Location Phone: [phone]          Chief Complaint  · \"bilateral feet pain making it very difficult to walk\"  · \"Still has a UTI\"  · \"knee starting to hurt again\"      History Of Present Illness  Noelle Nazario is a 80 year old /White female who presents for evaluation and treatment of:          Shoulder pain-  states its not really bothering her today, but this is normal as the pain will come and go.       States she has been having bilateral foot pain for the past month or so. she is having difficulty walking due to this. She says her feet are burning and throbbing.   She has knee pain as well that will mirgrate down into her feet.   Her knees will swell at times.     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      Ststes she is having really dark stools.   She aslo is still dealing with a UTI. Abx is not seeming to help.   Continues to be a bit confused and feels somewhat fatigued  No fevers  Reviewed cultures with her               Past Medical History  Disease Name Date Onset Notes   Allergies --  --    Arthritis --  --    Blood Diseases --  --    Broken Bones --  --    Cancer --  --    Cataract --  --    Chest pain --  stress by Dr. Medrano in 12/2015 was normal, because of her risk factors he recommened proceeding with cath, however she is reluctant to do this at this time, if chest pain worsens she will consider cath in future   Emphysema --  --    GERD --  --    Hodgkin's Lymphoma --  --    HTN (hypertension) --  --    Hyperlipemia --  --    Long term (current) use of anticoagulants --  --  "   Night sweats --  --    Pulmonary embolism --  --    Stroke --  --          Past Surgical History  Procedure Name Date Notes   Breast biopsy --  --    Hysterectomy --  --          Medication List  Name Date Started Instructions   biotin oral  --    Calcium 500 + D oral  --    clopidogrel 75 mg oral tablet 07/20/2020 TAKE 1 TABLET(75 MG) BY MOUTH EVERY DAY   ferrous sulfate 325 mg (65 mg iron) oral tablet 02/25/2021 TAKE 1 TABLET(325 MG) BY MOUTH TWICE DAILY   gabapentin 300 mg oral capsule 03/31/2021 Take 1 tablet by oral route twice daily   levothyroxine 25 mcg oral tablet 02/25/2021 TAKE 1 TABLET(25 MCG) BY MOUTH EVERY DAY   lisinopril 20 mg oral tablet 07/20/2020 TAKE 1 TABLET(20 MG) BY MOUTH EVERY DAY   Macrobid 100 mg oral capsule 04/14/2021 take 1 capsule (100 mg) by oral route every 12 hours with food for 7 days   mirtazapine 30 mg oral tablet 09/28/2020 TAKE 1 TABLET BY MOUTH DAILY   Multivitamin 50 Plus oral tablet  take 1 tablet by oral route every morning   omeprazole 20 mg oral capsule,delayed release(DR/EC) 01/21/2019 take 1 capsule (20 mg) by oral route once daily before a meal   Osteo Bi-Flex 250-200 mg oral tablet 12/04/2020 take 1 tablet by oral route daily for 90 days   Probiotic oral  take 1 by oral route daily   sertraline 50 mg oral tablet 02/03/2021 take 1 tablet (50 mg) by oral route once daily for 30 days   simethicone 125 mg oral capsule  take 1 capsule by oral route daily   Tylenol Arthritis Pain 650 mg oral tablet extended release 09/09/2019 take 2 tablets (1,300 mg) by oral route every 8 hours swallowing whole with water. Do not break, crush, dissolve and/or chew.   Vitamin B-12 oral  --    Vitamin D2 1,250 mcg (50,000 unit) oral capsule  take 1 capsule by oral route daily   Voltaren 1 % topical gel 07/14/2020 apply 2 grams to the affected area(s) by topical route 4 times per day   warfarin 2 mg oral tablet 12/04/2020 TAKE 1 TABLET(2 MG) BY MOUTH EVERY DAY         Allergy List  Allergen  "Name Date Reaction Notes   NO KNOWN DRUG ALLERGIES --  --  --        Allergies Reconciled  Family Medical History  Disease Name Relative/Age Notes   Stroke Father/  Mother/   --    Heart Disease  Sibling   Heart Attack (MI)  Sibling   Melanoma  Child         Reproductive History  Menstrual   Menopause Status: Postmenopausal   Pregnancy Summary   Total Pregnancies: 2 Full Term: 2 Premature: 0   Ab Induced: 0 Ab Spontaneous: 0 Ectopics: 0   Multiples: 0 Livin         Social History  Finding Status Start/Stop Quantity Notes   Alcohol Never --/-- --  --    Tobacco Former --/-- 1 PPD Quit  Hx smoked for 30yrs         Immunizations  NameDate Admin Mfg Trade Name Lot Number Route Inj VIS Given VIS Publication   [id number]/03/2021 SEQ Fluad [id number]  LD 2021   Comments: pt tolerated well and left office in stable condition, CAROLINA Reaves   [id number]/30/2018 MSD PNEUMOVAX 23 [id number] [facility] 2018   Comments: pt tolerated well and left office in stable condition, CAROLINA Reaves   Prevnar 1302017 WAL PREVNAR 13 [id number] [facility] 2017   Comments: pt tolerated well and left ofice in stable condition.         Vitals  Date Time BP Position Site L\R Cuff Size HR RR TEMP (F) WT  HT  BMI kg/m2 BSA m2 O2 Sat FR L/min FiO2 HC       2021 04:06 /68 Sitting    90 - R 16 96.9 141lbs 4oz 5'  6\" 22.8 1.73 95 %  21%    2021 11:20 /60 Sitting    91 - R  98.1 144lbs 0oz 5'  6\" 23.24 1.74 96 %  21%    2021 01:20 /72 Sitting    89 - R  97.7 144lbs 0oz 5'  6\" 23.24 1.74 96 %  21%          Physical Examination  · Constitutional  o Appearance  o : no acute distress, well-nourished  · Head and Face  o Head  o :   § Inspection  § : atraumatic, normocephalic  · Eyes  o Eyes  o : extraocular movements intact, no scleral icterus, no conjunctival injection  · Respiratory  o Respiratory Effort  o : breathing comfortably, symmetric chest rise  o Auscultation of " Lungs  o : clear to asculatation bilaterally, no wheezes, rales, or rhonchii  · Cardiovascular  o Heart  o :   § Auscultation of Heart  § : regular rate and rhythm, no murmurs, rubs, or gallops  o Peripheral Vascular System  o :   § Extremities  § : no edema  · Neurologic  o Mental Status Examination  o :   § Orientation  § : grossly oriented to person, place and time  o Gait and Station  o :   § Gait Screening  § : normal gait  · Psychiatric  o General  o : normal mood and affect  · IMP Knee Injection  o Knee  o : Bilateral knees   o Procedure info  o : Informed Consent obtained. Patient was informed of possible adverse effects including but not limited to bleeding, damage to nerve, tendon or artery, increased blood sugar, and increased blood pressure. They were instructed to call if they have any conerns or questions. Time out performed. Patient placed with knee in flexion at 90 degrees. Site marked inferior and lateral to patella. Betadine was swabbed at injection site per typical technique. 25 ga, 1.5 inch needle injected into bursa, aspiration was performed and then 80mg Kenalog and 1mL 1% Lidocaine without Epi was slowly injected into joint space, fluid was free flowing. Needle was removed, betadine wiped off and band-aid placed to injection site.           Assessment  · HTN (hypertension)     401.9/I10  · Hyperlipemia     272.4/E78.5  · Fatigue     780.79/R53.83  · Bilateral knee pain       Pain in right knee     719.46/M25.561  Pain in left knee     719.46/M25.562  · UTI (urinary tract infection)     599.0/N39.0       Injections today as her daughter was with her and we had a long discussion about what we could do for her arthritis offered a low-dose hydrocodone however at this point because she did extremely well with the knee injection several months ago they would like to try repeating this  Discussed side effects including increased blood pressure or increased sugars as she has significant dementia and  we are moving more to quality of life treatment they are comfortable with doing the steroids    Will treat urinary tract infection with Rocephin as it will treat one of the bacteria causing the issues discussed that it may not treat other however the only other options were Cipro or stronger IV antibiotics which they would rather not do at this time     Problems Reconciled  Plan  · Orders  o CBC with Auto Diff [facility] ([id number]) - 401.9/I10, 272.4/E78.5, 780.79/R53.83 - 04/14/2021  o CMP [facility] ([id number]) - 401.9/I10, 272.4/E78.5, 780.79/R53.83 - 04/14/2021  o TSH [facility] ([id number]) - 401.9/I10, 272.4/E78.5, 780.79/R53.83 - 04/14/2021  o Joint Injection; major joint or bursa (eg. shoulder, hip, knee, subacromial bursa) ([id number]) - 719.46/M25.561 - 04/14/2021   Patient given intra-articular injection to right knee. Patient tolerated well, left office in stable condition. 2ml kenalog mixed with 1 ml of lidocaine given by Dr. Cook and drawn by Stephanie HAMILTON MA.   o IM - Injection Fee [facility] ([id number]) - 599.0/N39.0 - 04/14/2021  o ACO-39: Current medications updated and reviewed (1159F, ) - - 04/14/2021  o Joint Injection; Intermediate joint or bursa (20605) - 719.46/M25.562 - 04/14/2021   Patient given intra-articular injection to left knee. Patient tolerated well, left office in stable condition. 2 ml of kenalog mixed with 1 ml lidocaine given by Dr. Marquita Cook, drawn by Stephanie HINES MA  o Rocephin 1 gram mixed with Lidocaine (-1) - 599.0/N39.0 - 04/14/2021   Injection - Rocephin 1 Gram; Dose: 2.1ml; Site: Right Gluteus; Route: intramuscular; Date: 04/14/2021 16:26:59; Exp: 02/01/2023; Lot: [id number]; Mfg: HOSPIRA; TradeName: Rocephin; Location: [facility] [place] [facility]; Administered By: Stephanie Murray; Comment: patient tolerated well, left office in stable condition  o 2 - Kenalog Injection 40mg (-8) - 719.46/M25.561 - 04/14/2021   Injection - Kenalog 40 mg; Dose: 2ml; Site: Right Knee; Route: intra-arterial; Date:  04/14/2021 16:28:36; Exp: 08/01/2022; Lot: [id number]; Mfg: [employer].; TradeName: Kenalog-40; Location: [facility]; Administered By: Stephanie Murray RTR; Comment: Patient tolerated well, left office in stable condition.  o 2 - Kenalog Injection 40mg (-3) - 719.46/M25.562 - 04/14/2021   Injection - Kenalog 40 mg; Dose: 2ml; Site: Left Knee; Route: intra-articular; Date: 04/14/2021 16:30:23; Exp: 05/01/2022; Lot: [id number]; Mfg: [employer].; TradeName: Kenalog-40; Location: [facility]; Administered By: Stephanie Murray RTR; Comment: Patient tolerated well, left office in stable condition.  · Medications  o Medications have been Reconciled  o Transition of Care or Provider Policy  · Instructions  o Patient was educated/instructed on their diagnosis, treatment and medications prior to discharge from the clinic today.            Electronically Signed by: Marquita Cook MD -Author on April 15, 2021 11:44:49 PM

## 2021-05-14 NOTE — PROGRESS NOTES
Progress Note      Patient Name: Noelle Nazario   Patient ID: [id number]   Sex: Female   YOB: 1940    Primary Care Provider: Marquita Cook MD    Visit Date: February 3, 2021    Provider: Marquita Cook MD   Location: [facility]   Location Address: [address] [place] [address], [facility]  [place], [place]  [id number]   Location Phone: [phone]          Chief Complaint  · follow up UTI  · flu vaccine      History Of Present Illness  Noelle Nazario is a 80 year old /White female who presents for evaluation and treatment of:      chronic issues.    she reports no burning or hurting when peeing  she says no incontinence episodes    she reports no visual/auditory hallucinations    she has a sister who is in bad shape and she is concerned about her    her son says she isn't acting different and is doing better since UTI treatment    still with some baseline dementia       Past Medical History  Disease Name Date Onset Notes   Allergies --  --    Arthritis --  --    Blood Diseases --  --    Broken Bones --  --    Cancer --  --    Cataract --  --    Chest pain --  stress by Dr. Medrano in 12/2015 was normal, because of her risk factors he recommened proceeding with cath, however she is reluctant to do this at this time, if chest pain worsens she will consider cath in future   Emphysema --  --    GERD --  --    Hodgkin's Lymphoma --  --    HTN (hypertension) --  --    Hyperlipemia --  --    Long term (current) use of anticoagulants --  --    Night sweats --  --    Pulmonary embolism --  --    Stroke --  --          Past Surgical History  Procedure Name Date Notes   Breast biopsy --  --    Hysterectomy --  --          Medication List  Name Date Started Instructions   biotin oral  --    Calcium 500 + D oral  --    Cipro 500 mg oral tablet 01/27/2021 take 1 tablet (500 mg) by oral route every 12 hours for 7 days   clopidogrel 75 mg oral tablet 07/20/2020 TAKE 1 TABLET(75 MG) BY MOUTH  EVERY DAY   ferrous sulfate 325 mg (65 mg iron) oral tablet 02/25/2021 TAKE 1 TABLET(325 MG) BY MOUTH TWICE DAILY   gabapentin 300 mg oral capsule 01/27/2021 Take 1 tablet by oral route twice daily   levothyroxine 25 mcg oral tablet 02/25/2021 TAKE 1 TABLET(25 MCG) BY MOUTH EVERY DAY   LEVOTHYROXINE 0.025MG (25MCG) TAB 02/22/2021 TAKE 1 TABLET(25 MCG) BY MOUTH EVERY DAY   lisinopril 20 mg oral tablet 07/20/2020 TAKE 1 TABLET(20 MG) BY MOUTH EVERY DAY   mirtazapine 30 mg oral tablet 09/28/2020 TAKE 1 TABLET BY MOUTH DAILY   Multivitamin 50 Plus oral tablet  take 1 tablet by oral route every morning   omeprazole 20 mg oral capsule,delayed release(/EC) 01/21/2019 take 1 capsule (20 mg) by oral route once daily before a meal   Osteo Bi-Flex 250-200 mg oral tablet 12/04/2020 take 1 tablet by oral route daily for 90 days   Probiotic oral  take 1 by oral route daily   sertraline 50 mg oral tablet 02/03/2021 take 1 tablet (50 mg) by oral route once daily for 30 days   simethicone 125 mg oral capsule  take 1 capsule by oral route daily   Tylenol Arthritis Pain 650 mg oral tablet extended release 09/09/2019 take 2 tablets (1,300 mg) by oral route every 8 hours swallowing whole with water. Do not break, crush, dissolve and/or chew.   Vitamin B-12 oral  --    Vitamin D2 1,250 mcg (50,000 unit) oral capsule  take 1 capsule by oral route daily   Voltaren 1 % topical gel 07/14/2020 apply 2 grams to the affected area(s) by topical route 4 times per day   warfarin 2 mg oral tablet 12/04/2020 TAKE 1 TABLET(2 MG) BY MOUTH EVERY DAY         Allergy List  Allergen Name Date Reaction Notes   NO KNOWN DRUG ALLERGIES --  --  --          Family Medical History  Disease Name Relative/Age Notes   Stroke Father/  Mother/   --    Heart Disease  Sibling   Heart Attack (MI)  Sibling   Melanoma  Child         Reproductive History  Menstrual   Menopause Status: Postmenopausal   Pregnancy Summary   Total Pregnancies: 2 Full Term: 2 Premature: 0  "  Ab Induced: 0 Ab Spontaneous: 0 Ectopics: 0   Multiples: 0 Livin         Social History  Finding Status Start/Stop Quantity Notes   Alcohol Never --/-- --  --    Tobacco Former --/-- 1 PPD Quit  Hx smoked for 30yrs         Immunizations  NameDate Admin Mfg Trade Name Lot Number Route Inj VIS Given VIS Publication   [id number]/03/2021 SEQ Fluad [id number]  LD 2021   Comments: pt tolerated well and left office in stable condition, CAROLINA Reaves   Mukozqgph8023/30/2018 MSD PNEUMOVAX 23 [id number] [facility] 2018   Comments: pt tolerated well and left office in stable condition, CAROLINA Reaves   Prevnar 1302017 WAL PREVNAR 13 [id number] [facility] 2017   Comments: pt tolerated well and left ofice in stable condition.         Vitals  Date Time BP Position Site L\R Cuff Size HR RR TEMP (F) WT  HT  BMI kg/m2 BSA m2 O2 Sat FR L/min FiO2 HC       2020 04:42 /64 Sitting    90 - R 18 98.5 140lbs 8oz 5'  6\" 22.68 1.72 95 %  21%    12/15/2020 10:32 /82 Sitting    91 - R  97.6 139lbs 4oz 5'  6\" 22.48 1.72 95 %  21%    2021 04:06 /68 Sitting    90 - R 16 96.9 141lbs 4oz 5'  6\" 22.8 1.73 95 %  21%          Physical Examination  · Constitutional  o Appearance  o : no acute distress, well-nourished  · Head and Face  o Head  o :   § Inspection  § : atraumatic, normocephalic  · Eyes  o Eyes  o : extraocular movements intact, no scleral icterus, no conjunctival injection  · Respiratory  o Respiratory Effort  o : breathing comfortably, symmetric chest rise  o Auscultation of Lungs  o : clear to asculatation bilaterally, no wheezes, rales, or rhonchii  · Cardiovascular  o Heart  o :   § Auscultation of Heart  § : regular rate and rhythm, no murmurs, rubs, or gallops  o Peripheral Vascular System  o :   § Extremities  § : no edema  · Neurologic  o Mental Status Examination  o :   § Orientation  § : grossly oriented to person, place and time  o Gait and Station  o : "   § Gait Screening  § : normal gait  · Psychiatric  o General  o : normal mood and affect          Results  · In-Office Procedures  o Lab procedure  § IOP - Urinalysis without Microscopy (Clinitek) [facility] ([id number])   § Color Ur: Yellow   § Clarity Ur: Clear   § Glucose Ur Ql Strip: Negative   § Bilirub Ur Ql Strip: Negative   § Ketones Ur Ql Strip: Negative   § Sp Gr Ur Qn: 1.030   § Hgb Ur Ql Strip: Trace-Intact   § pH Ur-LsCnc: 5.5   § Prot Ur Ql Strip: 30 mg/dL   § Urobilinogen Ur Strip-mCnc: 0.2 E.U./dL   § Nitrite Ur Ql Strip: Negative   § WBC Est Ur Ql Strip: Trace       Assessment  · Need for influenza vaccination     V04.81/Z23  · Urinary tract infection     599.0/N39.0  · HTN (hypertension)     401.9/I10  · Hyperlipemia     272.4/E78.5  · Dementia     294.20/F03.90       chronic issues stable  will increase sertraline to see if that helps with dementia personality symptoms  will cont to monitor urine closely as well       Plan  · Orders  o BMP [facility] ([id number]) - 599.0/N39.0 - 02/03/2021  o Urinalysis with Reflex Microscopy ([facility]) (40042) - 599.0/N39.0 - 02/03/2021  o Urine culture ([id number], 77392) - 599.0/N39.0 - 02/03/2021  o ACO-39: Current medications updated and reviewed (1159F, ) - - 02/03/2021  o PT/INR (78764) - - 02/03/2021  o IM/SQ - Injection Fee [facility] ([id number]) - - 02/03/2021  o FLUAD, QUAD SYR 0.5 mL (96683) - V04.81/Z23 - 02/03/2021   Vaccine - Influenza; Dose: 0.5; Site: Left Deltoid; Route: Intramuscular; Date: 02/03/2021 17:38:00; Exp: 06/30/2021; Lot: [id number]; Mfg: Seqirus; TradeName: Fluad; Administered By: Nora Galdamez MA; Comment: pt tolerated well and left office in stable condition, CAROLINA Reaves  · Medications  o sertraline 50 mg oral tablet   SIG: take 1 tablet (50 mg) by oral route once daily for 30 days   DISP: (30) Tablet with 3 refills  Adjusted on 02/03/2021     o Medications have been Reconciled  o Transition of Care or Provider Policy  · Instructions  o Patient was educated/instructed on  their diagnosis, treatment and medications prior to discharge from the clinic today.  · Disposition  o 3 Month Follow Up            Electronically Signed by: Marquita Cook MD -Author on February 25, 2021 09:57:50 AM

## 2021-05-14 NOTE — PROGRESS NOTES
"   Progress Note      Patient Name: Noelle Nazario   Patient ID: [id number]   Sex: Female   YOB: 1940    Primary Care Provider: Marquita Cook MD    Visit Date: April 8, 2021    Provider: Yamile Tam MD   Location: [facility]   Location Address: [address] [place] [address], [facility] [address]  [place], [place]  [id number]   Location Phone: [phone]          Chief Complaint  · \"Arthritis pain needing steroid shot \"  · \" Swelling in left leg was bad yeaterday better today\"      History Of Present Illness  Noelle Nazario is a 80 year old /White female who presents for evaluation and treatment of:      Chronic arthritis-for which she has been getting steroid injections, about every 3 mos in our office, she is next due next week. Had shots in her knees last December. A couple weeks ago she started to complain of pain. Started in her left LE with swelling, then her wrists, and shoulders as well as her elbows (new). She is accompanied by her son, daughter is present via phone. Her family states She has a chronic hx of joint pain, and this presentation is very typical for her; states that pain can get very debilitated very fast if not addressed when it first arises. Would like to know about possibility of her getting a steroid injection today.     Urinalysis:  She has a hx of UTI and has orders in the system for repeat UA which she would like to provide today.       Past Medical History  Disease Name Date Onset Notes   Allergies --  --    Arthritis --  --    Blood Diseases --  --    Broken Bones --  --    Cancer --  --    Cataract --  --    Chest pain --  stress by Dr. Medrano in 12/2015 was normal, because of her risk factors he recommened proceeding with cath, however she is reluctant to do this at this time, if chest pain worsens she will consider cath in future   Emphysema --  --    GERD --  --    Hodgkin's Lymphoma --  --    HTN (hypertension) --  --    Hyperlipemia --  --    Long term " (current) use of anticoagulants --  --    Night sweats --  --    Pulmonary embolism --  --    Stroke --  --          Past Surgical History  Procedure Name Date Notes   Breast biopsy --  --    Hysterectomy --  --          Medication List  Name Date Started Instructions   biotin oral  --    Calcium 500 + D oral  --    clopidogrel 75 mg oral tablet 07/20/2020 TAKE 1 TABLET(75 MG) BY MOUTH EVERY DAY   ferrous sulfate 325 mg (65 mg iron) oral tablet 02/25/2021 TAKE 1 TABLET(325 MG) BY MOUTH TWICE DAILY   gabapentin 300 mg oral capsule 03/31/2021 Take 1 tablet by oral route twice daily   levothyroxine 25 mcg oral tablet 02/25/2021 TAKE 1 TABLET(25 MCG) BY MOUTH EVERY DAY   lisinopril 20 mg oral tablet 07/20/2020 TAKE 1 TABLET(20 MG) BY MOUTH EVERY DAY   mirtazapine 30 mg oral tablet 09/28/2020 TAKE 1 TABLET BY MOUTH DAILY   Multivitamin 50 Plus oral tablet  take 1 tablet by oral route every morning   omeprazole 20 mg oral capsule,delayed release(DR/EC) 01/21/2019 take 1 capsule (20 mg) by oral route once daily before a meal   Osteo Bi-Flex 250-200 mg oral tablet 12/04/2020 take 1 tablet by oral route daily for 90 days   Probiotic oral  take 1 by oral route daily   sertraline 50 mg oral tablet 02/03/2021 take 1 tablet (50 mg) by oral route once daily for 30 days   simethicone 125 mg oral capsule  take 1 capsule by oral route daily   Tylenol Arthritis Pain 650 mg oral tablet extended release 09/09/2019 take 2 tablets (1,300 mg) by oral route every 8 hours swallowing whole with water. Do not break, crush, dissolve and/or chew.   Vitamin B-12 oral  --    Vitamin D2 1,250 mcg (50,000 unit) oral capsule  take 1 capsule by oral route daily   Voltaren 1 % topical gel 07/14/2020 apply 2 grams to the affected area(s) by topical route 4 times per day   warfarin 2 mg oral tablet 12/04/2020 TAKE 1 TABLET(2 MG) BY MOUTH EVERY DAY         Allergy List  Allergen Name Date Reaction Notes   NO KNOWN DRUG ALLERGIES --  --  --   "      Allergies Reconciled  Family Medical History  Disease Name Relative/Age Notes   Stroke Father/  Mother/   --    Heart Disease  Sibling   Heart Attack (MI)  Sibling   Melanoma  Child         Reproductive History  Menstrual   Menopause Status: Postmenopausal   Pregnancy Summary   Total Pregnancies: 2 Full Term: 2 Premature: 0   Ab Induced: 0 Ab Spontaneous: 0 Ectopics: 0   Multiples: 0 Livin         Social History  Finding Status Start/Stop Quantity Notes   Alcohol Never --/-- --  --    Tobacco Former --/-- 1 PPD Quit  Hx smoked for 30yrs         Immunizations  NameDate Admin Mfg Trade Name Lot Number Route Inj VIS Given VIS Publication   Qvxwicufa10/03/2021 SEQ Fluad [id number] [facility] 2021   Comments: pt tolerated well and left office in stable condition, CAROLINA Reaves   Pxdcsubtp0729/30/2018 MSD PNEUMOVAX 23 [id number] [facility] 2018   Comments: pt tolerated well and left office in stable condition, CAROLINA Reaves   Prevnar 1302 WAL PREVNAR 13 [id number] [facility] 2017   Comments: pt tolerated well and left ofice in stable condition.         Review of Systems  · Constitutional  o Denies  o : fever, fatigue, weight loss, weight gain  · Cardiovascular  o Denies  o : lower extremity edema, claudication, chest pressure, palpitations  · Respiratory  o Denies  o : shortness of breath, wheezing, cough, hemoptysis, dyspnea on exertion  · Gastrointestinal  o Denies  o : nausea, vomiting, diarrhea, constipation, abdominal pain  · Genitourinary  o * See HPI  · Musculoskeletal  o * See HPI      Vitals  Date Time BP Position Site L\R Cuff Size HR RR TEMP (F) WT  HT  BMI kg/m2 BSA m2 O2 Sat FR L/min FiO2 HC       2020 04:42 /64 Sitting    90 - R 18 98.5 140lbs 8oz 5'  6\" 22.68 1.72 95 %  21%    12/15/2020 10:32 /82 Sitting    91 - R  97.6 139lbs 4oz 5'  6\" 22.48 1.72 95 %  21%    2021 04:06 /68 Sitting    90 - R 16 96.9 141lbs 4oz 5'  6\" 22.8 1.73 95 %  " "21%    04/08/2021 11:20 /60 Sitting    91 - R  98.1 144lbs 0oz 5'  6\" 23.24 1.74 96 %  21%          Physical Examination  · Constitutional  o Appearance  o : no acute distress, well-nourished  · Head and Face  o Head  o :   § Inspection  § : atraumatic, normocephalic  · Respiratory  o Respiratory Effort  o : breathing comfortably, symmetric chest rise  o Auscultation of Lungs  o : clear to asculatation bilaterally, no wheezes, rales, or rhonchii  · Cardiovascular  o Heart  o :   § Auscultation of Heart  § : regular rate and rhythm, no murmurs, rubs, or gallops  o Peripheral Vascular System  o :   § Extremities  § : trace edema of LLE  · Skin and Subcutaneous Tissue  o General Inspection  o : no lesions present, no areas of discoloration, skin turgor normal  · Neurologic  o Mental Status Examination  o :   § Orientation  § : grossly oriented to person, place and time  o Gait and Station  o :   § Gait Screening  § : normal gait  · Psychiatric  o General  o : normal mood and affect     MSK: Passive ROM intact, however does have pain with active strength testing. Exhibit mild tenderness with palpation over her shoulders, elbows, wrists . No swelling or joint effusions noted, no overlying rash.           Assessment  · Arthritis     V13.4/V13.4  Chronic with current flare. Patient with multiple joint involvement with this current flare. Informed family that this provider does not perform intra-articular joint injections, however as she is having multiple joint involvement solumedrol subq ordered. She has f/u scheduled with pcp on 4/14 which she is advised to keep.   · History of UTI     V13.02/Z87.440  due for repeat UA. She was unable to provide sample today. She will come back for walk-in visit for this.     Problems Reconciled  Plan  · Orders  o ACO-39: Current medications updated and reviewed (1159F, ) - V13.4/V13.4, V13.02/Z87.440 - 04/08/2021  o Solu-Medrol Injection 125mg () - V13.4/V13.4 - " 04/08/2021   Injection - Solu-Medrol 125 mg; Dose: 125 mg; Site: Right Gluteus; Route: intramuscular; Date: 04/08/2021 12:51:23; Exp: 08/01/2022; Lot: [id number]; Mfg: PHARMACIA/UPJHN; TradeName: Solu-Medrol; Location: [facility]; Administered By: Soo Sánchez MA; Comment: patient tolerated well. left office in stable condition.  · Medications  o Medications have been Reconciled  o Transition of Care or Provider Policy  · Instructions  o Patient was educated/instructed on their diagnosis, treatment and medications prior to discharge from the clinic today.  o Call the office with any concerns or questions.  · Disposition  o Already has follow up scheduled            Electronically Signed by: Yamile Tam MD -Author on May 5, 2021 03:38:58 AM

## 2021-06-05 NOTE — PROGRESS NOTES
Progress Note      Patient Name: Noelle Nazario   Patient ID: [id number]   Sex: Female   YOB: 1940    Primary Care Provider: Marquita Cook MD   Referring Provider: Marquita Cook MD    Visit Date: May 4, 2021    Provider: Marquita Cook MD   Location[place] [facility]   Location Address: [address] [place] [address] [address]  [place], [place]  [id number]   Location Phone: [phone]          Chief Complaint  · f/u      for dementia       History Of Present Illness  Noelle Nazario is a 80 year old /White female who presents for evaluation and treatment of:      Chronic issues.    Still dealing with dementia  Had a fall at home recently  States she was out in the yard and bent over to pick something up and fell all the way over was lying on the ground when her son found her not totally sure how long she had been there but she thinks about 20 minutes  States she did not lose consciousness    Still having some changes in mental status on top of dementia that children think may be related to a urinary tract infection  Reviewed recent urine culture with them    Since the fall she has been having chest pain however it is pain with touching the chest only otherwise  No chest pain  No trouble breathing               Past Medical History  Disease Name Date Onset Notes   Allergies --  --    Arthritis --  --    Blood Diseases --  --    Broken Bones --  --    Cancer --  --    Cataract --  --    Chest pain --  stress by Dr. Medrano in 12/2015 was normal, because of her risk factors he recommened proceeding with cath, however she is reluctant to do this at this time, if chest pain worsens she will consider cath in future   Emphysema --  --    GERD --  --    Hodgkin's Lymphoma --  --    HTN (hypertension) --  --    Hyperlipemia --  --    Long term (current) use of anticoagulants --  --    Night sweats --  --    Pulmonary embolism --  --    Stroke --  --          Past Surgical History  Procedure  Name Date Notes   Breast biopsy --  --    Hysterectomy --  --          Medication List  Name Date Started Instructions   Bactrim -160 mg oral tablet 04/30/2021 take 1 tablet by oral route every 12 hours for 5 days   biotin oral  --    Calcium 500 + D oral  --    clopidogrel 75 mg oral tablet 07/20/2020 TAKE 1 TABLET(75 MG) BY MOUTH EVERY DAY   ferrous sulfate 325 mg (65 mg iron) oral tablet 02/25/2021 TAKE 1 TABLET(325 MG) BY MOUTH TWICE DAILY   gabapentin 300 mg oral capsule 04/29/2021 Take 1 tablet by oral route twice daily   levothyroxine 25 mcg oral tablet 02/25/2021 TAKE 1 TABLET(25 MCG) BY MOUTH EVERY DAY   lisinopril 20 mg oral tablet 07/20/2020 TAKE 1 TABLET(20 MG) BY MOUTH EVERY DAY   mirtazapine 30 mg oral tablet 09/28/2020 TAKE 1 TABLET BY MOUTH DAILY   Multivitamin 50 Plus oral tablet  take 1 tablet by oral route every morning   omeprazole 20 mg oral capsule,delayed release(DR/EC) 01/21/2019 take 1 capsule (20 mg) by oral route once daily before a meal   Osteo Bi-Flex 250-200 mg oral tablet 12/04/2020 take 1 tablet by oral route daily for 90 days   Probiotic oral  take 1 by oral route daily   sertraline 50 mg oral tablet 05/04/2021 take 1 tablet (50 mg) by oral route once daily for 30 days   simethicone 125 mg oral capsule  take 1 capsule by oral route daily   Tylenol Arthritis Pain 650 mg oral tablet extended release 09/09/2019 take 2 tablets (1,300 mg) by oral route every 8 hours swallowing whole with water. Do not break, crush, dissolve and/or chew.   Vitamin B-12 oral  --    Vitamin D2 1,250 mcg (50,000 unit) oral capsule  take 1 capsule by oral route daily   Voltaren 1 % topical gel 07/14/2020 apply 2 grams to the affected area(s) by topical route 4 times per day   warfarin 2 mg oral tablet 12/04/2020 TAKE 1 TABLET(2 MG) BY MOUTH EVERY DAY         Allergy List  Allergen Name Date Reaction Notes   NO KNOWN DRUG ALLERGIES --  --  --        Allergies Reconciled  Family Medical History  Disease  "Name Relative/Age Notes   Stroke Father/  Mother/   --    Heart Disease  Sibling   Heart Attack (MI)  Sibling   Melanoma  Child         Reproductive History  Menstrual   Menopause Status: Postmenopausal   Pregnancy Summary   Total Pregnancies: 2 Full Term: 2 Premature: 0   Ab Induced: 0 Ab Spontaneous: 0 Ectopics: 0   Multiples: 0 Livin         Social History  Finding Status Start/Stop Quantity Notes   Alcohol Never --/-- --  --    Tobacco Former --/-- 1 PPD Quit  Hx smoked for 30yrs         Immunizations  NameDate Admin Mfg Trade Name Lot Number Route Inj VIS Given VIS Publication   COVID [id number] NE Not Entered  NE NE 2021    Comments:    COVID [id number] NE Not Entered  NE NE 2021    Comments:    Cjprksvlo06/03/2021 SEQ Fluad [id number] [facility] 2021   Comments: pt tolerated well and left office in stable condition, CAROLINA Reaves   Guokhpufo7256/30/2018 MSD PNEUMOVAX 23 [id number] [facility] 2018   Comments: pt tolerated well and left office in stable condition, CAROLINA Reaves   Prevnar 1302 WAL PREVNAR 13 [id number] [facility] 2017   Comments: pt tolerated well and left ofice in stable condition.         Vitals  Date Time BP Position Site L\R Cuff Size HR RR TEMP (F) WT  HT  BMI kg/m2 BSA m2 O2 Sat FR L/min FiO2 HC       2021 04:06 /68 Sitting    90 - R 16 96.9 141lbs 4oz 5'  6\" 22.8 1.73 95 %  21%    2021 11:20 /60 Sitting    91 - R  98.1 144lbs 0oz 5'  6\" 23.24 1.74 96 %  21%    2021 01:20 /72 Sitting    89 - R  97.7 144lbs 0oz 5'  6\" 23.24 1.74 96 %  21%    2021 03:28 PM 96/58 Sitting    88 - R  97.9 145lbs 8oz 5'  6\" 23.48 1.75 92 %  21%          Physical Examination  · Constitutional  o Appearance  o : no acute distress, well-nourished  · Head and Face  o Head  o :   § Inspection  § : atraumatic, normocephalic  · Eyes  o Eyes  o : extraocular movements intact, no scleral icterus, no conjunctival " injection  · Respiratory  o Respiratory Effort  o : breathing comfortably, symmetric chest rise  o Auscultation of Lungs  o : clear to asculatation bilaterally, no wheezes, rales, or rhonchii  · Cardiovascular  o Heart  o :   § Auscultation of Heart  § : regular rate and rhythm, no murmurs, rubs, or gallops  o Peripheral Vascular System  o :   § Extremities  § : no edema  · Neurologic  o Mental Status Examination  o :   § Orientation  § : grossly oriented to person, place and time  o Gait and Station  o :   § Gait Screening  § : normal gait  · Psychiatric  o General  o : normal mood and affect     Pain with palpation around sternal area           Results  · In-Office Procedures  o Lab procedure  § IOP - Urinalysis without Microscopy (Clinitek) [facility] (01213)   § Color Ur: Yellow   § Clarity Ur: Clear   § Glucose Ur Ql Strip: Negative   § Bilirub Ur Ql Strip: Moderate   § Ketones Ur Ql Strip: Negative   § Sp Gr Ur Qn: 1.025   § Hgb Ur Ql Strip: Negative   § pH Ur-LsCnc: 6.0   § Prot Ur Ql Strip: Negative   § Urobilinogen Ur Strip-mCnc: 0.2 E.U./dL   § Nitrite Ur Ql Strip: Negative   § WBC Est Ur Ql Strip: Trace   § IOP - Urine Drug Screen In-House [facility] (48250)   § Amphetamines Ur Ql: Negative   § Barbiturates Ur Ql: Negative   § Buprenorphine+Nor Ur Ql Scn: Negative   § Benzodiaz Ur Ql: Positive   § Cocaine Ur Ql: Negative   § Methadone Ur Ql: Negative   § Methamphet Ur Ql: Negative   § MDMA Ur Ql Scn: Negative   § Opiates Ur Ql: Negative   § Oxycodone Ur Ql: Negative   § PCP Ur Ql: Negative   § THC Ur Ql: Negative   § Temp in Range?: Within/Acceptable   § Control Seen?: Yes       Assessment  · Rib pain on left side     786.50/R07.81  · Falls     E888.9/W19.XXXA  · Weakness     780.79/R53.1  · Abnormal urinalysis     791.9/R82.90       Get x-rays today  Discussed the importance of not being by herself at home  Children stated understanding of this and they're getting caregivers in place to help  Rocephin for abnormal  UTI  Continue to monitor closely     Problems Reconciled  Plan  · Orders  o ACO-39: Current medications updated and reviewed (, 1159F) - - 05/04/2021  o Xray ribs with PA chest left [facility] (08144) - 786.50/R07.81 - 05/04/2021  o IM/SQ - Injection Fee [facility] (85396) - - 05/04/2021  o 4.00 - Rocephin Injection 1 Gram (1000mg) (-0) - 786.50/R07.81 - 05/04/2021   Injection - Rocephin 1 Gram; Dose: 2 mls; Site: Right Gluteus; Route: intramuscular; Date: 05/04/2021 16:56:23; Exp: 01/01/2023; Lot: [id number]; Mfg: N/A; TradeName: N/A; Location: [facility]; Administered By: Nora Galdamez MA; Comment: pt tolerated well and left office in stable condition, CAROLINA Reaves  o HOME HEALTH-PHYSICAL THERAPY CONSULTATION (HOMPT) - E888.9/W19.XXXA, 780.79/R53.1 - 05/04/2021  o Urine Culture [facility] (12003) - 791.9/R82.90 - 05/04/2021  · Medications  o sertraline 50 mg oral tablet   SIG: take 1 tablet (50 mg) by oral route once daily for 30 days   DISP: (90) Tablet with 0 refills  Refilled on 05/04/2021     o Medications have been Reconciled  o Transition of Care or Provider Policy  · Instructions  o Patient was educated/instructed on their diagnosis, treatment and medications prior to discharge from the clinic today.            Electronically Signed by: Marquita Cook MD -Author on May 23, 2021 05:34:12 PM

## 2021-06-05 NOTE — PROGRESS NOTES
Progress Note      Patient Name: Noelle Nazario   Patient ID: [id number]   Sex: Female   YOB: 1940    Primary Care Provider: Marquita Cook MD    Visit Date: May 4, 2021    Provider: Marquita Cook MD   Location: [facility]   Location Address: [address] [place] [address] [address]  [place], [place]  [id number]   Location Phone: [phone]          Chief Complaint  · Annual Wellness Exam      History Of Present Illness  The patient is a 80 year old /White female who has come to this office for her Annual Wellness Visit.   Her Primary Care Provider is Marquita Cook MD. Her comprehensive Care Team list, including suppliers, has been updated on the Facesheet. Her medical/family history, height, weight, BMI, and blood pressure have been reviewed and are in the chart. The Health Risk Assessment has been completed and scanned in the chart.   Medications are listed in the medication list.   The active problem list includes: Allergies, Arthritis, Blood Diseases, Broken Bones, Cancer, Cataract, Dementia, Emphysema, GERD, Hodgkin's Lymphoma, HTN (hypertension), Hyperlipemia, Long term (current) use of anticoagulants, and Stroke   The patient does not have a history of substance use.   Patient reports her diet is adequate.   The Mini-Cog has been administered and is scanned in chart. The results are positive. Her cognitive function is severely limited, including forgetfulness.   A hearing loss screen was completed today and the result is positive, indicating a need for further evaluation.   Patient does not have any risk factors for depression. Patient completed the PHQ-9 today and it has been scanned in the chart. The total score is 1-4.   The Timed Up and Go screen was administered today and the result is negative.   The Monzon Index of Avery in ADLs indicated full function (score of 6).   A Falls Risk Assessment has been completed, including a review of home fall hazards and  "medication review.   Overall, the patient's functional ability is noted by this provider to be within normal limits. Her level of safety is noted to be within normal limits. Her balance/gait is within normal limits. There has been a fall without injury in the past year. Patient-specific home safety recommendations have been reviewed and a copy has been given to patient.   She denies issues with leaking urine.   There are no additional risk factors identified.   Living Will/Advanced Directive previously executed but not in chart.   Personalized health advice was given to the patient and a written health screening schedule was established; see Plan for details.             Allergy List    Allergies Reconciled  Vitals  Date Time BP Position Site L\R Cuff Size HR RR TEMP (F) WT  HT  BMI kg/m2 BSA m2 O2 Sat FR L/min FiO2 HC       05/04/2021 03:28 PM 96/58 Sitting    88 - R  97.9 145lbs 8oz 5'  6\" 23.48 1.75 92 %  21%              Assessment  · Encounter for Medicare annual wellness exam     V70.0/Z00.00  · Screening for depression     V79.0/Z13.89  · Screening for alcoholism     V79.1/Z13.39    Problems Reconciled  Plan  · Orders  o Falls Risk Assessment Completed (3288F) - V70.0/Z00.00 - 05/04/2021  o Brief hearing screening (written) [facility] () - V70.0/Z00.00 - 05/04/2021  o Annual wellness visit; includes a personalized prevention plan of service (pps), initial visit () - V70.0/Z00.00 - 05/04/2021  o Presence or absence of urinary incontinence assessed (BARRY) (1090F) - V70.0/Z00.00 - 05/04/2021  o ACO-18: Negative screen for clinical depression using a standardized tool () - V79.0/Z13.89 - 05/04/2021  o Annual alcohol screening using the AUDIT-C tool, 15 minutes [facility] (19526, ) - V79.1/Z13.39 - 05/04/2021  o Negative alcohol screening () - V79.1/Z13.39 - 05/04/2021  o ACO-13: Fall Risk Screening with no falls in past year or only one fall without injury in the past year (1101F) - V70.0/Z00.00 - " 05/04/2021  o ACO-14: Influenza immunization administered or previously received () - V70.0/Z00.00 - 05/04/2021  o ACO-15: Pneumococcal Vaccine Administered or Previously Received (4040F) - - 05/04/2021  o ACO-16: BMI within normal range and no follow-up plan is required () - V70.0/Z00.00 - 05/04/2021  o ACO-17: Current tobacco non-user (1036F) - V70.0/Z00.00 - 05/04/2021  o ACO-39: Current medications updated and reviewed (, 1159F) - V70.0/Z00.00 - 05/04/2021  · Medications  o Medications have been Reconciled  o Transition of Care or Provider Policy  · Instructions  o Health Risk Assessment has been reviewed with the patient.  o Written health screening schedule for next 5-10 years was established with patient; information scanned in chart and given/mailed to patient.  o Fall prevention methods discussed and a copy of recommendations given/mailed to patient.  o Today's PHQ-9 score is: _3__  o Audit-C Questionnaire completed and scanned into the EMR under the designated folder within the patient's documents.  o Audit-C score of 0-4 - Negative Screen - Brief Discussion            Electronically Signed by: Marquita Cook MD -Author on May 16, 2021 06:14:53 PM

## 2021-06-09 PROBLEM — Z79.01 LONG TERM (CURRENT) USE OF ANTICOAGULANTS: Status: ACTIVE | Noted: 2021-01-01

## 2021-06-09 PROBLEM — I63.9 STROKE (HCC): Status: ACTIVE | Noted: 2021-01-01

## 2021-06-09 PROBLEM — R26.89 BALANCE DISORDER: Status: ACTIVE | Noted: 2021-01-01

## 2021-06-09 PROBLEM — I26.99 PULMONARY EMBOLISM: Status: ACTIVE | Noted: 2021-01-01

## 2021-06-09 PROBLEM — M19.90 ARTHRITIS: Status: ACTIVE | Noted: 2021-01-01

## 2021-06-09 PROBLEM — K21.9 ESOPHAGEAL REFLUX: Status: ACTIVE | Noted: 2021-01-01

## 2021-06-09 PROBLEM — E78.5 HYPERLIPEMIA: Status: ACTIVE | Noted: 2021-01-01

## 2021-06-09 PROBLEM — I10 HTN (HYPERTENSION): Status: ACTIVE | Noted: 2021-01-01

## 2021-06-09 PROBLEM — H26.9 CATARACT: Status: ACTIVE | Noted: 2021-01-01

## 2021-06-09 PROBLEM — F03.90 DEMENTIA (HCC): Status: ACTIVE | Noted: 2021-01-01

## 2021-06-09 NOTE — TELEPHONE ENCOUNTER
Priscilla physical therapist from [facility] called [phone] called and wanted to let you know that she had fallen and bruised her knee and the therapist said with her confusion she is needing 24 hour care and has already talked with the family but wanted you aware and to see what you thought.

## 2021-06-09 NOTE — PROGRESS NOTES
"Chief Complaint  Bleeding/Bruising (bruising to left leg after fall) and blood work    Subjective          Noelle Nazario presents to [facility]  History of Present Illness    Has had two falls   Moving ok currently  Doesn't feel like she is having long term damage  Her care giver is now going back to 3 days a week so there are 2 days where she is by herself.    PT Note-  Her endurance is poor, recommends 24 hr supervision for safety  Feels like her balance is really bad, particularly when her eyes are closed  Therefore concern that when the light isn't good her balance will be bad  PT will cont to help, but not sure that she is safe    Ms. Nazario doesn't like for other people to be at her house  She is ok with people coming in and cleaning  They are currently using her long term care insurance for the care    Lymphoma-  Sees Dr. Sterling on Friday   She had a scan done to review    Starting to have a little trouble with sleep  She is waking up at night trying to get ready for the day, but when her son tells her to go back to sleep she usually does    HTN-  Hasn't been checking it lately  Hasn't felt bad  No chest pain  No breathing issues    Due for PT/INR draw today     Objective   Vital Signs:   /70   Pulse 96   Temp 97.1 °F (36.2 °C)   Ht 167.6 cm (66\")   Wt 66.6 kg (146 lb 12.8 oz)   SpO2 95%   BMI 23.69 kg/m²     Physical Exam  Vitals reviewed.   Constitutional:       Appearance: Normal appearance. She is well-developed.   HENT:      Head: Normocephalic and atraumatic.      Right Ear: External ear normal.      Left Ear: External ear normal.      Mouth/Throat:      Pharynx: No oropharyngeal exudate.   Eyes:      Conjunctiva/sclera: Conjunctivae normal.      Pupils: Pupils are equal, round, and reactive to light.   Cardiovascular:      Rate and Rhythm: Normal rate and regular rhythm.      Heart sounds: No murmur heard.   No friction rub. No " gallop.    Pulmonary:      Effort: Pulmonary effort is normal.      Breath sounds: Normal breath sounds. No wheezing or rhonchi.   Skin:     General: Skin is warm and dry.   Neurological:      Mental Status: She is alert and oriented to person, place, and time.      Cranial Nerves: No cranial nerve deficit.   Psychiatric:         Mood and Affect: Affect normal.         Behavior: Behavior normal.         Thought Content: Thought content normal.        Result Review :   The following data was reviewed by: Marquita Cook MD on 06/09/2021:  Common labs    Common Labsle 4/14/21 4/14/21 6/7/21 6/9/21 6/9/21 6/9/21    1500 1500  1803 1803 1803   Glucose     100 (A)    Glucose  96       BUN  14   21    Creatinine  0.73 0.90  0.82    eGFR Non African Am     67    Sodium  141   137    Potassium  4.1   4.3    Chloride  102   102    Calcium  9.8   9.7    Albumin  3.9   4.20    Total Bilirubin  0.49   0.3    Alkaline Phosphatase  79   95    AST (SGOT)  22   19    ALT (SGPT)  14   18    WBC 10.24   10.44     Hemoglobin 13.5   14.1     Hematocrit 42.3   42.8     Platelets 241   207     Total Cholesterol      323 (A)   Triglycerides      229 (A)   HDL Cholesterol      53   LDL Cholesterol       224 (A)   (A) Abnormal value       Comments are available for some flowsheets but are not being displayed.              Procedures      Assessment and Plan    Diagnoses and all orders for this visit:    1. Essential hypertension (Primary)  -     Protime-INR; Future  -     CBC & Differential  -     Comprehensive Metabolic Panel  -     Cancel: Vitamin D 1,25 Dihydroxy  -     Lipid Panel  -     Protime-INR  -     Vitamin D 1,25 Dihydroxy; Future    2. Vitamin D deficiency  -     Cancel: Vitamin D 1,25 Dihydroxy  -     Vitamin D 1,25 Dihydroxy; Future    3. Chronic deep vein thrombosis (DVT) of proximal vein of lower extremity, unspecified laterality (CMS/HCC)  -     Protime-INR; Future  -     Protime-INR    4. Mixed hyperlipidemia  -      Lipid panel; Future    5. Long term (current) use of anticoagulants    6. Late onset Alzheimer's dementia without behavioral disturbance (CMS/HCC)  Comments:  Family understands progression of illness and are prepared to continue to monitor and work with her moving forward  will let us know if things are changing     7. Balance disorder  Assessment & Plan:  Cont to work with pt  Discussed the importance of 24/7 caregiver      Other orders  -     atorvastatin (LIPITOR) 10 MG tablet; Take 1 tablet by mouth Every Night.  Dispense: 90 tablet; Refill: 0  check labs and adjust meds as needed based on results    Follow Up   No follow-ups on file.  Patient was given instructions and counseling regarding her condition or for health maintenance advice. Please see specific information pulled into the AVS if appropriate.

## 2021-06-14 NOTE — TELEPHONE ENCOUNTER
Called patients daughter (she is on release) and let her know to bring paperwork in to drop off and the provider can fill those out and then that can be take to get updated handicap sticker. She stated understanding. No other issues or concerns noted currently per patient.

## 2021-06-14 NOTE — TELEPHONE ENCOUNTER
Caller: LIBAN ZUÑIGA    Relationship: Emergency Contact    Best call back number: [phone]    What was the call regarding: PATIENTS DAUGHTER CALLED STATING SHE WOULD LIKE A CALL BACK TO DISCUSS RENEWING HER MOTHERS HANDICAP STICKER    Do you require a callback: YES

## 2021-06-17 NOTE — TELEPHONE ENCOUNTER
Caller: Noelle Nazario    Relationship: Self    Best call back number: [phone]     What is the best time to reach you: ANY    What was the call regarding: PATIENT CALLED TO DISCUSS HAVING HER MOM GO ON CHOLESTEROL MEDS.  PLEASE CALL BACK TO DISCUSS, THANK YOU.    Do you require a callback: YES

## 2021-06-17 NOTE — TELEPHONE ENCOUNTER
Called patient and she said she wasn't sure and didn't know anything about starting medications. No other issues or concerns noted currently per patient.

## 2021-06-17 NOTE — TELEPHONE ENCOUNTER
Patient daughter called and said she wanted to know if a cholesterol medicine would even be needed or worth the side effects. Patient is already tired and a falls risk and worried would cholesterol meds even be needed or helpful. Please advise.

## 2021-06-21 NOTE — TELEPHONE ENCOUNTER
Honestly tough call, meds could possibly cause worsening dementia, but not taking meds could make a stroke more likely.  There is no right answer.  If she tolerated meds before I would recommend restarting.  If she really doesn't like taking meds it's ok not to.

## 2021-07-06 NOTE — TELEPHONE ENCOUNTER
Caller: LIBAN ZUÑIGA    Relationship: Emergency Contact    Best call back number: [phone]    Medication needed:   Requested Prescriptions     Pending Prescriptions Disp Refills   • gabapentin (NEURONTIN) 300 MG capsule       Sig: Take 1 capsule by mouth 2 (two) times a day.       When do you need the refill by: ASAP     What additional details did the patient provide when requesting the medication: OUT OF MEDICATION     Does the patient have less than a 3 day supply:  [] Yes  [x] No    What is the patient's preferred pharmacy: [id number]  [place], [place] - [address]  AT [facility] [address] - [phone] [facility] [phone]

## 2021-07-12 NOTE — TELEPHONE ENCOUNTER
Caller: LIBAN ZUÑIGA    Relationship to patient: Emergency Contact    Best call back number: [phone]    Patient is needing: PATIENT'S DAUGHTER CALLED IN AND SAID HER MOTHER IS HAVING BACK PAIN AND PAIN THAT IS RADIATING TO HER STOMACH ALSO. PATIENT'S DAUGHTER WOULD LIKE A CALL BACK WITH ADVICE AND NEXT STEPS PLEASE.

## 2021-07-13 NOTE — TELEPHONE ENCOUNTER
Patient daughter called stating that patient stated that it hurts to take a deep breath. Discussed with Dr. Cook symptoms. Patient is not having any shortness of breath, fever, confusion more than normal. Does report being weak but daughter thinks from current UTI. Discussed symptoms to watch for and when to seek further care. Voiced understand and will call office if no improvement by Thursday.

## 2021-07-14 NOTE — TELEPHONE ENCOUNTER
Spoke to patient daughter and she says she still has there back and side pain . She started her abx on Monday night . She is increasing her fluids.

## 2021-07-21 PROBLEM — J18.9 PNEUMONIA: Status: ACTIVE | Noted: 2021-01-01

## 2021-07-21 PROBLEM — A41.9 SEPSIS (HCC): Status: ACTIVE | Noted: 2021-01-01

## 2021-07-21 NOTE — TELEPHONE ENCOUNTER
Caller: LIBAN ZUÑIGA    Relationship to patient: Emergency Contact    Best call back number: [phone]    Patient is needing: PATIENT'S DAUGHTER CALLED AND REQUESTED A CALL BACK FROM SOMEONE CLINICAL. THE PATIENT FELL THIS MORNING AND IS NOW COMPLAINING OF ACHING ALL OVER, AS WELL AS THE DAUGHTER STATES THAT SHE IS WHEEZING AND HAS A LOW GRADE TEMPERATURE.     AN ATTEMPT WAS MADE TO SCHEDULE THIS PATIENT, HOWEVER, DAUGHTER DECLINED.       PLEASE CALL AND ADVISE.

## 2021-07-22 PROBLEM — E87.20 LACTIC ACIDOSIS: Status: ACTIVE | Noted: 2021-01-01

## 2021-07-23 NOTE — TELEPHONE ENCOUNTER
Caller: LIBAN ZUÑIGA    Relationship to patient: Emergency Contact    Best call back number: [phone]     Patient is needing: PATIENT'S DAUGHTER CALLED AND STATED THAT HER MOTHER IS DUE TO GIVE LABS ON 07/27/21, HOWEVER, SHE IS CURRENTLY IN THE HOSPITAL. DAUGHTER IS ASKING WHEN WOULD BE AN APPROPRIATE TIME TO COME GIVE LABS.     PLEASE CALL AND ADVISE.

## 2021-07-23 NOTE — TELEPHONE ENCOUNTER
Discussed with patient daughter, answered what questions within my scope that the daughter had. At this point patient is out of the ICU. D/C all labs at this time on our part and will reassess need of labs at discharge.     Dr. Cook please review recent admisson notes.

## 2021-07-26 NOTE — TELEPHONE ENCOUNTER
Caller: LIBAN ZUÑIGA    Relationship: Emergency Contact    Best call back number: [phone]    What is the best time to reach you: ANYTIME    Who are you requesting to speak with (clinical staff, provider,  specific staff member): DR KELLY TOURE        What was the call regarding: PATIENT DAUGHTER CALLED AND STATED THAT THIS PATIENT HAS BEEN IN THE HOSPITAL SINCE LAST WEDNESDAY AND THEY FOUND OUT THAT SHE HAS A PROLAPSED BLADDER. PATIENT DAUGHTER IS REQUESTING A CALL BACK FROM DR KELLY TOURE TO MAKE SURE ANY SURGERY IS OKAY.     Do you require a callback: YES

## 2021-07-29 NOTE — TELEPHONE ENCOUNTER
Discussed with patient daughter. Patient is currently in rehab and daughter will pursue referral after d/c from rehab.

## 2021-08-13 PROBLEM — E43 SEVERE MALNUTRITION (HCC): Status: ACTIVE | Noted: 2021-01-01

## 2021-08-23 NOTE — TELEPHONE ENCOUNTER
DELETE AFTER REVIEWING: Telephone encounter to be sent to the clinical pool     Caller: LIBAN ZUÑIGA    Relationship: Emergency Contact    Best call back number: [phone]    What medications are you currently taking:   Current Outpatient Medications on File Prior to Visit   Medication Sig Dispense Refill   • atorvastatin (LIPITOR) 10 MG tablet Take 1 tablet by mouth Every Night for 30 days. 30 tablet 0   • calcium carbonate (OS-LACEY) 600 MG tablet Take 1,200 mg by mouth Daily.     • cefdinir (OMNICEF) 300 MG capsule Take 1 capsule by mouth Every 12 (Twelve) Hours for 3 doses. Indications: Urinary Tract Infection 3 capsule 0   • cholecalciferol (VITAMIN D3) 10 MCG (400 UNIT) tablet Take 800 Units by mouth Daily.     • clopidogrel (PLAVIX) 75 MG tablet Take 75 mg by mouth Every Evening.     • ferrous sulfate 325 (65 FE) MG tablet Take 325 mg by mouth Daily With Lunch & Dinner.     • levothyroxine (SYNTHROID, LEVOTHROID) 25 MCG tablet Take 25 mcg by mouth Daily.     • mirtazapine (REMERON) 30 MG tablet TAKE 1 TABLET BY MOUTH DAILY (Patient taking differently: Take 30 mg by mouth Every Night.) 90 tablet 0   • omeprazole (priLOSEC) 20 MG capsule Take 20 mg by mouth Daily.     • Probiotic Product (Risaquad-2) capsule capsule Take 1 capsule by mouth Daily.     • sertraline (ZOLOFT) 50 MG tablet Take 50 mg by mouth Every Evening.     • simethicone (MYLICON) 125 MG chewable tablet Chew 125 mg Every Morning.     • vitamin B-12 (CYANOCOBALAMIN) 1000 MCG tablet Take 1,000 mcg by mouth Daily.     • warfarin (COUMADIN) 2 MG tablet Take 2 mg by mouth Daily. Med rec tech reports: PT IS TAKING WARFARIN 2MG QD 07/20-07/26 DUE TO PCP ORDER UNTIL NEXT INR.       Current Facility-Administered Medications on File Prior to Visit   Medication Dose Route Frequency Provider Last Rate Last Admin   • [DISCONTINUED] acetaminophen (TYLENOL) tablet 1,000 mg  1,000 mg Oral Q8H Delroy Groves PA   1,000 mg at 08/22/21 1118   • [DISCONTINUED]  atorvastatin (LIPITOR) tablet 10 mg  10 mg Oral Nightly August Regalado PA   10 mg at 08/21/21 1806   • [DISCONTINUED] bisacodyl (DULCOLAX) EC tablet 5 mg  5 mg Oral Daily PRN August Regalado PA   5 mg at 08/19/21 1027   • [DISCONTINUED] bisacodyl (DULCOLAX) suppository 10 mg  10 mg Rectal Daily PRN August Regalado PA       • [DISCONTINUED] cefdinir (OMNICEF) capsule 300 mg  300 mg Oral Q12H Delroy Groves PA   300 mg at 08/22/21 0811   • [DISCONTINUED] clopidogrel (PLAVIX) tablet 75 mg  75 mg Oral Daily August Regalado PA   75 mg at 08/22/21 0811   • [DISCONTINUED] Home Med Diclofenac4%/Clonidine 0.2%/Gabapentin 10%/ in Lidocaine/Prilocaine 2.5%  1,000 mg Topical 4x Daily Delroy Groves PA   1,000 mg at 08/22/21 1118   • [DISCONTINUED] levothyroxine (SYNTHROID, LEVOTHROID) tablet 25 mcg  25 mcg Oral Q AM August Regalado PA   25 mcg at 08/22/21 0811   • [DISCONTINUED] lidocaine (LIDODERM) 5 % 1 patch  1 patch Transdermal Q24H Mike Stout MD   1 patch at 08/22/21 0811   • [DISCONTINUED] megestrol (MEGACE) 40 MG/ML suspension 100 mg  100 mg Oral Daily Delroy Groves PA   100 mg at 08/22/21 0811   • [DISCONTINUED] melatonin tablet 10 mg  10 mg Oral Nightly PRN August Regalado PA   10 mg at 08/21/21 1812   • [DISCONTINUED] miconazole (MICOTIN) 2 % powder   Topical Q12H Delroy Groves PA   Given at 08/22/21 0812   • [DISCONTINUED] mirtazapine (REMERON) tablet 30 mg  30 mg Oral Nightly August Regalado PA   30 mg at 08/21/21 1806   • [DISCONTINUED] pantoprazole (PROTONIX) EC tablet 40 mg  40 mg Oral Q AM Mike Stout MD   40 mg at 08/22/21 0532   • [DISCONTINUED] Pharmacy to dose warfarin   Does not apply Continuous PRN Delroy Groves PA       • [DISCONTINUED] polyethylene glycol (MIRALAX) packet 17 g  17 g Oral Daily PRN August Regalado PA   17 g at 08/20/21 0955   • [DISCONTINUED] risperiDONE (risperDAL) tablet 0.5 mg  0.5 mg Oral Nightly PRN August Regalado PA   0.5 mg at 08/17/21 7607   • [DISCONTINUED]  saccharomyces boulardii (FLORASTOR) capsule 250 mg  250 mg Oral BID Delroy Groves PA   250 mg at 08/22/21 0811   • [DISCONTINUED] sennosides-docusate (PERICOLACE) 8.6-50 MG per tablet 2 tablet  2 tablet Oral BID PRN August Regalado PA   2 tablet at 08/20/21 0507   • [DISCONTINUED] sertraline (ZOLOFT) tablet 50 mg  50 mg Oral Daily Mike Stout MD   50 mg at 08/22/21 0811   • [DISCONTINUED] Sodium Chloride (PF) 0.9 % 10 mL  10 mL Intravenous Q12H Delroy Groves PA   10 mL at 08/10/21 2105   • [DISCONTINUED] sodium chloride 0.9 % flush 10 mL  10 mL Intravenous Q12H Raul Loco MD   10 mL at 08/18/21 1000   • [DISCONTINUED] sodium chloride 0.9 % flush 10 mL  10 mL Intravenous PRN Raul Loco MD   10 mL at 08/12/21 2018   • [DISCONTINUED] traZODone (DESYREL) tablet 50 mg  50 mg Oral Nightly PRN Mike Stout MD   50 mg at 08/20/21 2010   • [DISCONTINUED] warfarin (COUMADIN) tablet 2 mg  2 mg Oral Once Raul Loco MD            Which medication are you concerned about: LIBAN STATED: PATIENT WAS TAKEN OFF GABAPENTIN WHEN IN [facility] AND HAVING HAND PAIN AND SWELLING IN BOTH HANDS, THE CREAM WITH MEDICATIONS DLPCG DI/LI/LA/CL/GA IS ONLY EFFECTIVE FOR A VERY SHORT PERIOD OF TIME, WANTING TO KNOW IF GABAPENTIN CAN BE STARTED AGAIN PRIOR TO 8/27/21 HOSPITAL FOLLOW UP     What are your concerns: PATIENT IN PAIN   WITH SWELLING IN BOTH HANDS    How long have you had these concerns: 7 DAYS

## 2021-08-24 NOTE — TELEPHONE ENCOUNTER
Ok to give melatonin, place orders as thry need for ot, pt, inr, ok to restart  gabapentin monitor for drowsiness

## 2021-08-24 NOTE — TELEPHONE ENCOUNTER
[facility] nurse with [facility] called with concerns: [phone] is call back number.    Warfarin and melatonin had an interaction and nurse told them to hold melatonin until they see us on Friday.  Gabapentin was stopped in the hospital and she is having some pain in her hands.  Has a skin tear on left leg and covering with dry dressing.  Has bruising on buttock and they are using barrier cream now.    Also PT and OT are coming out to evaluate.    If patient stays on warfarin they can do INR checks at home with orders per home health. Please advise.

## 2021-08-27 PROBLEM — N39.0 RECURRENT UTI: Status: ACTIVE | Noted: 2021-01-01

## 2021-08-27 NOTE — PROGRESS NOTES
"Chief Complaint  IPF (UTI and PNA), discuss prolasped bladder, Med Refill, and reoccurring UTI's (discuss a daily abx)    Subjective          Noelle Nazario presents to [facility]  History of Present Illness    Yesterday very weak  Slurring words yesterday  Was complaining of hand pain  No fever  No pain    Not coughing  Reviewed CXR with her  Likely residual    Sleeping well    Anxiety   Her daughter notices a difference with the zoloft    Arthritis-  Hands really bothering her   Gabapentin really helped before  Just restarted and she is doing well  They will montior for worsening mentation with this, but as it helps quality of life want to cont to for now    Did have prolapsed bladder in hospital, doesn't seem to bother her now and daughter wants to try macrobid first    Reviewed UA    Doing well off the lisinopril  bp at home has been well controlled    Objective   Vital Signs:   /78   Pulse 111   Temp 97.3 °F (36.3 °C)   Ht 165.1 cm (65\")   Wt 61.2 kg (135 lb)   SpO2 96%   BMI 22.47 kg/m²     Physical Exam  Vitals reviewed.   Constitutional:       Appearance: Normal appearance. She is well-developed.   HENT:      Head: Normocephalic and atraumatic.      Right Ear: External ear normal.      Left Ear: External ear normal.      Mouth/Throat:      Pharynx: No oropharyngeal exudate.   Eyes:      Conjunctiva/sclera: Conjunctivae normal.      Pupils: Pupils are equal, round, and reactive to light.   Cardiovascular:      Rate and Rhythm: Normal rate and regular rhythm.      Heart sounds: No murmur heard.   No friction rub. No gallop.    Pulmonary:      Effort: Pulmonary effort is normal.      Breath sounds: Normal breath sounds. No wheezing or rhonchi.   Skin:     General: Skin is warm and dry.   Neurological:      Mental Status: She is alert and oriented to person, place, and time.      Cranial Nerves: No cranial nerve deficit.      Comments: In " wheelchair, but daughter says she does walk some at home   Psychiatric:         Mood and Affect: Affect normal.         Behavior: Behavior normal.         Thought Content: Thought content normal.        Result Review :     Common labs    Common Labsle 8/16/21 8/16/21 8/17/21 8/17/21 8/18/21 8/18/21    0552 0552 0452 0452 050 0509   Glucose  135 (A)  125 (A)  117 (A)   BUN  32 (A)  28 (A)  25 (A)   Creatinine  0.70  0.65  0.69   eGFR Non  Am  80  87  82   Sodium  134 (A)  136  135 (A)   Potassium  3.9  4.0  4.0   Chloride  98  102  102   Calcium  10.1  10.3  10.1   Albumin      3.20 (A)   WBC 15.29 (A)  13.90 (A)  12.82 (A)    Hemoglobin 10.8 (A)  10.4 (A)  10.4 (A)    Hematocrit 34.4  32.4 (A)  33.9 (A)    Platelets 462 (A)  441  472 (A)    (A) Abnormal value               Procedures      Assessment and Plan    Diagnoses and all orders for this visit:    1. Pneumonia due to infectious organism, unspecified laterality, unspecified part of lung (Primary)  Comments:  likely continuing to heal, will monitor for other symptoms  Orders:  -     XR Chest PA & Lateral (In Office)  -     Urinalysis With Culture If Indicated -; Future  -     Urinalysis With Culture If Indicated - Urine, Clean Catch  -     Urinalysis, Microscopic Only - Urine, Clean Catch  -     Urine Culture - Urine, Urine, Clean Catch    2. Mixed hyperlipidemia  Comments:  chosing to stop meds, discussed risk and benefit    3. Essential hypertension  Comments:  well controlled off lisinopril, cont to monitor    4. Gastroesophageal reflux disease, unspecified whether esophagitis present  Comments:  will come off meds, and montior symptoms can restart    5. Recurrent UTI  Comments:  daily macrobid to prevent recurrent infections    6. Dementia with behavioral disturbance, unspecified dementia type (CMS/[facility])  Comments:  sleeping well cont to monitor; cont with em health to improve strength    Other orders  -     gabapentin (NEURONTIN) 300 MG capsule;  Take 1 capsule by mouth 2 (two) times a day.  Dispense: 120 capsule; Refill: 0  -     nitrofurantoin, macrocrystal-monohydrate, (Macrobid) 100 MG capsule; Take 1 capsule by mouth Daily.  Dispense: 90 capsule; Refill: 0        Follow Up   Return in about 6 weeks (around 10/8/2021).  Patient was given instructions and counseling regarding her condition or for health maintenance advice. Please see specific information pulled into the AVS if appropriate.

## 2021-08-31 PROBLEM — J18.9 HCAP (HEALTHCARE-ASSOCIATED PNEUMONIA): Status: ACTIVE | Noted: 2021-01-01

## 2021-08-31 NOTE — PROGRESS NOTES
Chief Complaint  No chief complaint on file.   Acute visit for not doing well    Subjective          Noelle Nazario presents to [facility]  History of Present Illness  Patient understanding that this is a telehealth visit.  I cannot perform a full physical exam, therefore something might be missed, or patient may need to come into the office for further evaluation.  Patient is understanding and consents to this type of visit.  Doximity    Didn't rest well last night  Night caregiver last night noticed a temp of 103  After that she vomited  +cough, but not continuous  No runny nose  Eating slightly  Did take meds this AM    Nurse who is there currently states that she has crackles    Rings are swollen to her finger      Objective   Vital Signs:   There were no vitals taken for this visit.    Physical Exam   Result Review :       Gen: well-nourished, no acute distress  HENT: atraumatic, normocephalic  Eyes: extraocular movements intact, no scleral icterus  Lung: breathing comfortably, no cough  Skin: no visible rash, no lesions  Neuro: grossly oriented to person, place, not to time. no facial droop   Psych: normal mood and affect but plesantly demented    Common labs    Common Labsle 8/17/21 8/17/21 8/18/21 8/18/21 8/31/21    0452 0452 0509 0509    Glucose  125 (A)  117 (A)    BUN  28 (A)  25 (A)    Creatinine  0.65  0.69    eGFR Non African Am  87  82    Sodium  136  135 (A)    Potassium  4.0  4.0    Chloride  102  102    Calcium  10.3  10.1    Albumin    3.20 (A)    WBC 13.90 (A)  12.82 (A)  12.57 (A)   Hemoglobin 10.4 (A)  10.4 (A)  11.0 (A)   Hematocrit 32.4 (A)  33.9 (A)  35.7   Platelets 441  472 (A)  356   (A) Abnormal value               Procedures      Assessment and Plan    Diagnoses and all orders for this visit:    1. Fever, unspecified fever cause (Primary)    2. Dementia with behavioral disturbance, unspecified dementia type (CMS/HCC)    3. Pneumonia  due to infectious organism, unspecified laterality, unspecified part of lung    unfortunately appears to be worsening  I felt her CXR the day she was in clinic was residual as she was asymptomatic at that time.  Unfortunately she is worsening  Had risk benefit discussion with daughter    In between comfort care and full care  Have decreased pill burden recently to help with comfort    However daughter would still like to treat pneumonia.  Discussed this could be heart vs viral as well, but at this point as it is possible to treat pneumonia will send to ER for further work up.     Offered outpatient abx, but as she would likely need IV at this point family prefers hospital.    Follow Up   No follow-ups on file.  Patient was given instructions and counseling regarding her condition or for health maintenance advice. Please see specific information pulled into the AVS if appropriate.

## 2021-09-01 NOTE — PLAN OF CARE
Goal Outcome Evaluation:  Plan of Care Reviewed With: patient  Dysphagia evaluation completed.  Recommend diet mechanical soft solids, thin liquids.  Speech therapy to follow for mild dysphagia.

## 2021-09-01 NOTE — THERAPY EVALUATION
Acute Care - Speech Language Pathology   Swallow Initial Evaluation  [place]     Patient Name: Noelle Nazario  : 1940  MRN: [id number]  Today's Date: 2021               Admit Date: 2021    Visit Dx:     ICD-10-CM ICD-9-CM   1. HCAP (healthcare-associated pneumonia)  J18.9 486   2. Decreased activities of daily living (ADL)  Z78.9 V49.89   3. Oropharyngeal dysphagia  R13.12 787.22     Patient Active Problem List   Diagnosis   • Pancytopenia (CMS/HCC)   • Arthritis   • Breast mass, right   • Cataract   • Dementia (CMS/HCC)   • Depressive disorder, not elsewhere classified   • Esophageal reflux   • Hyperlipemia   • Hypokalemia   • Hyponatremia   • Hodgkin's disease in adult (CMS/HCC)   • HTN (hypertension)   • Long term (current) use of anticoagulants   • Pulmonary embolism (CMS/HCC)   • Stroke (CMS/HCC)   • Balance disorder   • Sepsis (CMS/HCC)   • Pneumonia   • Lactic acidosis   • Severe malnutrition (CMS/HCC)   • Recurrent UTI   • HCAP (healthcare-associated pneumonia)     Past Medical History:   Diagnosis Date   • Allergies    • Arthritis    • Blood disease    • Broken bones    • Cancer (CMS/HCC)    • Cataract    • Chest pain     stress by Dr. Medrano in 2015 was normal, beacause of her risk fasctors he recommended proceeding with cath, however she is reluctant to do this at this time, if chest pain worsents she will consider cath in the future   • Dementia (CMS/HCC)    • Disease of thyroid gland    • Emphysema, unspecified (CMS/HCC)    • GERD (gastroesophageal reflux disease)    • History of transfusion    • Hodgkin's lymphoma (CMS/HCC)    • HTN (hypertension)    • Hyperlipemia    • Long term (current) use of anticoagulants    • Night sweats    • Pulmonary embolism (CMS/HCC)    • Stroke (CMS/HCC)      Past Surgical History:   Procedure Laterality Date   • BREAST BIOPSY     • COLONOSCOPY     • EYE SURGERY     • HYSTERECTOMY         SLP Recommendation and Plan     SLP Diet Recommendation:  mechanical soft with no mixed consistencies, thin liquids  Recommended Precautions and Strategies: upright posture during/after eating, small bites of food and sips of liquid  SLP Rec. for Method of Medication Administration: meds whole, as tolerated     Monitor for Signs of Aspiration: yes        Anticipated Discharge Disposition (SLP): skilled nursing facility, inpatient rehabilitation facility, home with home health     Therapy Frequency (Swallow): daily, 5 days per week  Predicted Duration Therapy Intervention (Days): until discharge                         Plan of Care Reviewed With: patient         SWALLOW EVALUATION (last 72 hours)      SLP Adult Swallow Evaluation     Row Name 09/01/21 1124                   Rehab Evaluation    Document Type  evaluation  -SN        Subjective Information  no complaints  -SN        Patient Observations  alert;cooperative  -SN           Recommendations    Therapy Frequency (Swallow)  daily;5 days per week  -SN        Predicted Duration Therapy Intervention (Days)  until discharge  -SN        SLP Diet Recommendation  mechanical soft with no mixed consistencies;thin liquids  -SN        Recommended Precautions and Strategies  upright posture during/after eating;small bites of food and sips of liquid  -SN        SLP Rec. for Method of Medication Administration  meds whole;as tolerated  -SN        Monitor for Signs of Aspiration  yes  -SN          User Key  (r) = Recorded By, (t) = Taken By, (c) = Cosigned By    Initials Name Effective Dates    SN Luli Richards, BRADLEY 03/31/21 -            Inpatient Speech Pathology Dysphagia Evaluation        PAIN SCALE: None indicated    PRECAUTIONS/CONTRAINDICATIONS: Enhanced airborne    SUSPECTED ABUSE/NEGLECT/EXPLOITATION: None identified    SOCIAL/PSYCHOLOGICAL NEEDS/BARRIERS: None identified    PAST SOCIAL HISTORY: Patient recently discharged from skilled nursing facility, living at home    PRIOR FUNCTION: Patient on a regular diet    PATIENT  GOALS/EXPECTATIONS: To return home    HISTORY: Patient is referred for speech pathology dysphagia evaluation for assessment of swallow function and aspiration risk.  Patient had recently been discharged from a skilled nursing facility following pneumonia.  Patient had been at home however reportedly with onset weakness, altered mental status/confusion.  Chest x-ray revealed infiltrates in left lower lobe with airspace disease in right lower lobe.  COVID-19 testing is pending at this time.    CURRENT DIET LEVEL: Regular solids, thin liquids    OBJECTIVE:    TEST ADMINISTERED: Clinical dysphagia evaluation    COGNITION/SAFETY AWARENESS: Appears appropriate for environment however not thoroughly evaluated    BEHAVIORAL OBSERVATIONS: Patient is alert and cooperative.  Follows commands during evaluation    ORAL MOTOR EXAM: Left-sided facial weakness noted however 4+/5 upon retraction.  Lingual protrusion without deviation.  Patient with natural dentition however several missing.  Lingual lateralization bilaterally 4+/5.    VOICE QUALITY: Clear    REFLEX EXAM: Within functional limits    POSTURE: Assisted with sitting fully upright    FEEDING/SWALLOWING FUNCTION: Swallow function assessed with thin liquids, nectar thickened liquids, purée solids, regular solids    CLINICAL OBSERVATIONS: Nectar thick liquids by spoon and by cup with mild delayed swallows completed.  Vocal quality clear and laryngeal sounds clear with auscultation.  Thin liquids by spoon and by cup with mild delayed swallows completed.  Vocal quality laryngeal sounds clear to auscultation.  Puréed solids by spoon with lingual pumping with swallow completed.  Throat clearing with double swallow however laryngeal sounds clear to auscultation.  Regular solids with decreased chewing with noted left-sided buccal residue.  Patient with decreased awareness of left buccal residue requiring cues for lingual sweep to clear.  Required multiple attempts at lingual  sweep for complete clearance.  Nectar thickened liquids by straw with swallow completed with mild delay.  Thin liquids by straw with single sip.  Mild delayed swallow completed.  Free drinking of thin liquids by straw with swallows completed with throat clearing and delayed cough.  Second trial of thin liquid by single sip from straw with swallow completed.  Laryngeal ovation was noted to palpation.  Patient exhibiting swallow delay with all consistencies and difficulty clearing regular solids from left buccal cavity.  Patient also exhibiting signs symptoms of possible aspiration with free drinking of thin liquid via straw.  Silent aspiration cannot be ruled out at bedside.    DYSPHAGIA CRITERIA: Risk of aspiration, swallow delay, decreased oral motor strength/range of motion    FUNCTIONAL ASSESSMENT INSTRUMENT: Patient currently scored a level 5 of 7 on Functional Communication Measures for swallowing indicating a 20-39% limitation in function.    ASSESSMENT/ PLAN OF CARE:  Pt presents with limitations, noted below, that impede patient's ability to tolerate regular solids and thin liquids safely and independently. The skills of a therapist will be required to safely and effectively implement the following treatment plan to restore maximal level of function.    PROBLEMS:  1.  Risk of aspiration   LTG 1: 30 days.  Patient will increase functional communication measures for swallowing to level 6 of 7, indicating 1-19% limitation in function.   STG 1a: 14 days.  Patient will tolerate least restrictive diet mechanical soft solids, thin liquids with minimal to no signs or symptoms of aspiration.   STG 1b: 14 days.  Patient will tolerate least restrictive diet of mechanical soft solids, thin liquids utilizing strategies with minimal cues.   STG 1c: 14 days.  Patient will tolerate least restrictive diet mechanical soft solids, thin liquids utilizing strategies with moderate cues.   TREATMENT: Speech therapy for dysphagia,  education of strategies and tolerance of least restrictive diet.      FREQUENCY/DURATION: Daily, 5 days a week    REHAB POTENTIAL:  Pt has good rehab potential.  The following limitations may influence improvement/ length of tx medical status.    RECOMMENDATIONS:   1.   DIET: Mechanical soft solids, thin liquids    2.  POSITION: Fully upright for all p.o. intake, 30 minutes following    3.  COMPENSATORY STRATEGIES: Small bites and sips, no straw, controlled drink via cup    Pt/responsible party agrees with plan of care and has been informed of all alternatives, risks and benefits.    EDUCATION  The patient has been educated in the following areas:   Dysphagia (Swallowing Impairment).              Time Calculation:   Time Calculation- SLP     Row Name 09/01/21 1128             Time Calculation- SLP    SLP Start Time  1020  -SN      SLP Stop Time  1120  -SN      SLP Time Calculation (min)  60 min  -SN      SLP Received On  09/01/21  -SN         Untimed Charges    47784-RI Eval Oral Pharyng Swallow Minutes  60  -SN         Total Minutes    Untimed Charges Total Minutes  60  -SN       Total Minutes  60  -SN        User Key  (r) = Recorded By, (t) = Taken By, (c) = Cosigned By    Initials Name Provider Type    Luli Aguirre SLP Speech and Language Pathologist          Therapy Charges for Today     Code Description Service Date Service Provider Modifiers Qty    [id number] HC ST EVAL ORAL PHARYNG SWALLOW 4 9/1/2021 Luli Richards SLP GN 1               BRADLEY Vallejo  9/1/2021

## 2021-09-01 NOTE — CONSULTS
Purpose of the visit was to evaluate for: goals of care/advanced care planning. Spoke with family/HCS and discussed goals of care, resuscitation status and advanced care planning.      Assessment:  Palliative care called patient's daughter to introduce the team and explain our role.  I explained patient continues to have increased confusion, has a special diet ordered per speech and we needed to clarify goals and code status.      She stated at this time they want to keep her a full code.  She also stated they do not want a feeding tube because they feel she will pull it out. I briefly discussed treatment options; full treatment versus comfort care with hospice services.  At this time, we will continue to follow and assist the family in making decisions regarding patient's care and what to expect as she declines.        Recommendations/Plan: Continue to follow and assist with decision making and goals.    Tasks Completed: Code Status clarification and Emotional Support.      Janna Gomez, ELN, RN, CHPN

## 2021-09-01 NOTE — PLAN OF CARE
Goal Outcome Evaluation:  Plan of Care Reviewed With: patient, family        Progress: improving  Outcome Summary: received pt from ED. VSS. Pt presents with significant weakness. Alert to person and situation. patient aware of self and hospital stay but unable to provide, birthday, current year or name of hospital. swelling noted on under ring on ring finger of right hand. unable to remove rings. Family notified about issue.

## 2021-09-01 NOTE — THERAPY EVALUATION
Patient Name: Noelle Nazario  : 1940    MRN: [id number]                              Today's Date: 2021       Admit Date: 2021    Visit Dx:     ICD-10-CM ICD-9-CM   1. HCAP (healthcare-associated pneumonia)  J18.9 486   2. Decreased activities of daily living (ADL)  Z78.9 V49.89     Patient Active Problem List   Diagnosis   • Pancytopenia (CMS/HCC)   • Arthritis   • Breast mass, right   • Cataract   • Dementia (CMS/HCC)   • Depressive disorder, not elsewhere classified   • Esophageal reflux   • Hyperlipemia   • Hypokalemia   • Hyponatremia   • Hodgkin's disease in adult (CMS/HCC)   • HTN (hypertension)   • Long term (current) use of anticoagulants   • Pulmonary embolism (CMS/HCC)   • Stroke (CMS/HCC)   • Balance disorder   • Sepsis (CMS/HCC)   • Pneumonia   • Lactic acidosis   • Severe malnutrition (CMS/HCC)   • Recurrent UTI   • HCAP (healthcare-associated pneumonia)     Past Medical History:   Diagnosis Date   • Allergies    • Arthritis    • Blood disease    • Broken bones    • Cancer (CMS/HCC)    • Cataract    • Chest pain     stress by Dr. Medrano in 2015 was normal, beacause of her risk fasctors he recommended proceeding with cath, however she is reluctant to do this at this time, if chest pain worsents she will consider cath in the future   • Dementia (CMS/HCC)    • Disease of thyroid gland    • Emphysema, unspecified (CMS/HCC)    • GERD (gastroesophageal reflux disease)    • History of transfusion    • Hodgkin's lymphoma (CMS/HCC)    • HTN (hypertension)    • Hyperlipemia    • Long term (current) use of anticoagulants    • Night sweats    • Pulmonary embolism (CMS/HCC)    • Stroke (CMS/HCC)      Past Surgical History:   Procedure Laterality Date   • BREAST BIOPSY     • COLONOSCOPY     • EYE SURGERY     • HYSTERECTOMY       General Information     Row Name 21 0804          OT Time and Intention    Document Type  evaluation  -PG     Mode of Treatment  individual therapy;occupational  therapy  -PG     Row Name 09/01/21 0804          General Information    Patient Profile Reviewed  yes  -PG     Prior Level of Function  -- Patient is a poor historian due to dementia.  Patient reports that she receives assistance with her dressing and bathing activities  -PG     Existing Precautions/Restrictions  fall  -PG     Barriers to Rehab  cognitive status  -PG     Row Name 09/01/21 0804          Occupational Profile    Reason for Services/Referral (Occupational Profile)  Decreased ADL performance  -PG     Row Name 09/01/21 0804          Living Environment    Lives With  child(bill), adult  -PG     Row Name 09/01/21 0804          Cognition    Orientation Status (Cognition)  verbal cues/prompts needed for orientation  -PG     Row Name 09/01/21 0804          Safety Issues, Functional Mobility    Safety Issues Affecting Function (Mobility)  ability to follow commands;judgment;problem-solving;sequencing abilities  -PG     Impairments Affecting Function (Mobility)  balance;cognition;endurance/activity tolerance;strength  -PG     Cognitive Impairments, Mobility Safety/Performance  awareness, need for assistance  -PG       User Key  (r) = Recorded By, (t) = Taken By, (c) = Cosigned By    Initials Name Provider Type    PG Shaquille Landeros OT Occupational Therapist          Mobility/ADL's     Row Name 09/01/21 0813          Transfers    Comment (Transfers)  2 person assist bedside commode transfer per nursing  -PG     Row Name 09/01/21 0813          Activities of Daily Living    BADL Assessment/Intervention  -- Patient dependent/maximal assist with all self-care, dependent toileting  -PG       User Key  (r) = Recorded By, (t) = Taken By, (c) = Cosigned By    Initials Name Provider Type    PG Shaquille Landeros OT Occupational Therapist        Obj/Interventions     Row Name 09/01/21 0813          Sensory Assessment (Somatosensory)    Sensory Assessment (Somatosensory)  sensation intact  -PG     Row Name 09/01/21 0813           Vision Assessment/Intervention    Visual Impairment/Limitations  unable/difficult to assess  -PG     Row Name 09/01/21 0813          Range of Motion Comprehensive    General Range of Motion  no range of motion deficits identified  -PG     Row Name 09/01/21 0813          Strength Comprehensive (MMT)    Comment, General Manual Muscle Testing (MMT) Assessment  4 -/5 bilateral upper extremity strength  -PG     Row Name 09/01/21 0814          Motor Skills    Motor Skills  coordination;functional endurance  -PG     Coordination  fine motor deficit  -PG     Functional Endurance  Fair minus  -PG       User Key  (r) = Recorded By, (t) = Taken By, (c) = Cosigned By    Initials Name Provider Type    PG Shaquille Landeros, OT Occupational Therapist        Goals/Plan     [facility] Name 09/01/21 0816          Transfer Goal 1 (OT)    Activity/Assistive Device (Transfer Goal 1, OT)  transfers, all  -PG     [place] Level/Cues Needed (Transfer Goal 1, OT)  contact guard assist  -PG     Time Frame (Transfer Goal 1, OT)  long term goal (LTG);10 days  -PG     [facility] Name 09/01/21 0816          Bathing Goal 1 (OT)    Activity/Device (Bathing Goal 1, OT)  bathing skills, all  -PG     [place] Level/Cues Needed (Bathing Goal 1, OT)  minimum assist (75% or more patient effort)  -PG     Time Frame (Bathing Goal 1, OT)  long term goal (LTG);10 days  -PG     [facility] Name 09/01/21 0816          Dressing Goal 1 (OT)    Activity/Device (Dressing Goal 1, OT)  upper body dressing  -PG     [place]/Cues Needed (Dressing Goal 1, OT)  minimum assist (75% or more patient effort)  -PG     Time Frame (Dressing Goal 1, OT)  long term goal (LTG);10 days  -PG     [facility] Name 09/01/21 0816          Toileting Goal 1 (OT)    Activity/Device (Toileting Goal 1, OT)  toileting skills, all  -PG     [place] Level/Cues Needed (Toileting Goal 1, OT)  minimum assist (75% or more patient effort)  -PG     Time Frame (Toileting Goal 1, OT)  long term goal (LTG);10 days  -PG      [facility] Name 09/01/21 0816          Grooming Goal 1 (OT)    Activity/Device (Grooming Goal 1, OT)  grooming skills, all  -PG     [place] (Grooming Goal 1, OT)  minimum assist (75% or more patient effort)  -PG     Time Frame (Grooming Goal 1, OT)  long term goal (LTG);10 days  -PG     Row Name 09/01/21 0816          Strength Goal 1 (OT)    Strength Goal 1 (OT)  Patient will improve bilateral upper extremity strength to 4+/5 to support ADL independence  -PG     Time Frame (Strength Goal 1, OT)  long term goal (LTG);10 days  -PG     Row Name 09/01/21 0816          Therapy Assessment/Plan (OT)    Planned Therapy Interventions (OT)  activity tolerance training;BADL retraining;occupation/activity based interventions;strengthening exercise;transfer/mobility retraining  -PG       User Key  (r) = Recorded By, (t) = Taken By, (c) = Cosigned By    Initials Name Provider Type    PG Shaquille Landeros, OT Occupational Therapist        Clinical Impression     Row Name 09/01/21 0815          Pain Assessment    Additional Documentation  Pain Scale: Numbers Pre/Post-Treatment (Group)  -PG     Row Name 09/01/21 0815          Pain Scale: Numbers Pre/Post-Treatment    Pretreatment Pain Rating  0/10 - no pain  -PG     Posttreatment Pain Rating  0/10 - no pain  -PG     Row Name 09/01/21 0815          Plan of Care Review    Plan of Care Reviewed With  patient  -PG     Progress  no change  -PG     Outcome Summary  Patient presents with limitations affecting strength, activity tolerance, and balance impacting patient's ability to return home safely and independently.  The skills of a therapist will be required to safely and effectively implement the following treatment plan to restore maximal level of function  -PG     Row Name 09/01/21 0815          Therapy Assessment/Plan (OT)    Patient/Family Therapy Goal Statement (OT)  Unknown  -PG     Criteria for Skilled Therapeutic Interventions Met (OT)  yes;meets criteria;skilled treatment is  necessary  -PG     Therapy Frequency (OT)  5 times/wk  -PG     Row Name 09/01/21 0815          Therapy Plan Review/Discharge Plan (OT)    Anticipated Discharge Disposition (OT)  sub acute care setting  -PG       User Key  (r) = Recorded By, (t) = Taken By, (c) = Cosigned By    Initials Name Provider Type    PG Shaquille Landeros, OT Occupational Therapist        Outcome Measures     Row Name 09/01/21 0818          How much help from another is currently needed...    Putting on and taking off regular lower body clothing?  1  -PG     Bathing (including washing, rinsing, and drying)  1  -PG     Toileting (which includes using toilet bed pan or urinal)  1  -PG     Putting on and taking off regular upper body clothing  2  -PG     Taking care of personal grooming (such as brushing teeth)  2  -PG     Eating meals  3  -PG     AM-PAC 6 Clicks Score (OT)  10  -PG     Row Name 09/01/21 0136          How much help from another person do you currently need...    Turning from your back to your side while in flat bed without using bedrails?  3  -MS     Moving from lying on back to sitting on the side of a flat bed without bedrails?  3  -MS     Moving to and from a bed to a chair (including a wheelchair)?  2  -MS     Standing up from a chair using your arms (e.g., wheelchair, bedside chair)?  2  -MS     Climbing 3-5 steps with a railing?  2  -MS     To walk in hospital room?  2  -MS     AM-PAC 6 Clicks Score (PT)  14  -MS     Row Name 09/01/21 0818          Functional Assessment    Outcome Measure Options  AM-PAC 6 Clicks Daily Activity (OT);Optimal Instrument  -PG     Row Name 09/01/21 0818          Optimal Instrument    Optimal Instrument  Optimal - 3  -PG     Bending/Stooping  4  -PG     Standing  3  -PG     Reaching  2  -PG     From the list, choose the 3 activities you would most like to be able to do without any difficulty  Bending/stooping;Standing;Reaching  -PG     Total Score Optimal - 3  9  -PG       User Key  (r) = Recorded  By, (t) = Taken By, (c) = Cosigned By    Initials Name Provider Type    PG Shaquille Landeros OT Occupational Therapist    MS [facility] RNA Registered Nurse          Occupational Therapy Education                 Title: PT OT SLP Therapies (Done)     Topic: Occupational Therapy (Done)     Point: ADL training (Done)     Description:   Instruct learner(s) on proper safety adaptation and remediation techniques during self care or transfers.   Instruct in proper use of assistive devices.              Learning Progress Summary           Patient Acceptance, E,D, VU,DU by PG at 9/1/2021 0820                   Point: Home exercise program (Done)     Description:   Instruct learner(s) on appropriate technique for monitoring, assisting and/or progressing therapeutic exercises/activities.              Learning Progress Summary           Patient Acceptance, E,D, VU,DU by PG at 9/1/2021 0820                   Point: Precautions (Done)     Description:   Instruct learner(s) on prescribed precautions during self-care and functional transfers.              Learning Progress Summary           Patient Acceptance, E,D, VU,DU by PG at 9/1/2021 0820                   Point: Body mechanics (Done)     Description:   Instruct learner(s) on proper positioning and spine alignment during self-care, functional mobility activities and/or exercises.              Learning Progress Summary           Patient Acceptance, E,D, VU,DU by PG at 9/1/2021 0820                               User Key     Initials Effective Dates Name Provider Type Discipline     06/16/21 -  Shaquille Landeros OT Occupational Therapist OT              OT Recommendation and Plan  Planned Therapy Interventions (OT): activity tolerance training, BADL retraining, occupation/activity based interventions, strengthening exercise, transfer/mobility retraining  Therapy Frequency (OT): 5 times/wk  Plan of Care Review  Plan of Care Reviewed With: patient  Progress: no change  Outcome  Summary: Patient presents with limitations affecting strength, activity tolerance, and balance impacting patient's ability to return home safely and independently.  The skills of a therapist will be required to safely and effectively implement the following treatment plan to restore maximal level of function     Time Calculation:   Time Calculation- OT     Row Name 09/01/21 0822             Time Calculation- OT    OT Received On  09/01/21  -PG      OT Goal Re-Cert Due Date  09/10/21  -PG         Untimed Charges    OT Eval/Re-eval Minutes  35  -PG         Total Minutes    Untimed Charges Total Minutes  35  -PG       Total Minutes  35  -PG        User Key  (r) = Recorded By, (t) = Taken By, (c) = Cosigned By    Initials Name Provider Type    PG Shaquille Landeros OT Occupational Therapist        Therapy Charges for Today     Code Description Service Date Service Provider Modifiers Qty    [id number] HC OT EVAL LOW COMPLEXITY 3 9/1/2021 Shaquille Landeros OT GO 1               Shaquille Landeros OT  9/1/2021

## 2021-09-01 NOTE — PLAN OF CARE
Problem: COPD Comorbidity  Goal: Maintenance of COPD Symptom Control  Outcome: Ongoing, Progressing     Problem: Pain Chronic (Persistent) (Comorbidity Management)  Goal: Acceptable Pain Control and Functional Ability  Outcome: Ongoing, Progressing     Problem: Infection  Goal: Infection Symptom Resolution  Outcome: Ongoing, Progressing  Intervention: Prevent or Manage Infection  Recent Flowsheet Documentation  Taken 9/1/2021 0937 by Angie Coelho RN  Isolation Precautions: airborne precautions maintained     Problem: Skin Injury Risk Increased  Goal: Skin Health and Integrity  Outcome: Ongoing, Progressing     Problem: Fall Injury Risk  Goal: Absence of Fall and Fall-Related Injury  Outcome: Ongoing, Progressing  Intervention: Promote Injury-Free Environment  Recent Flowsheet Documentation  Taken 9/1/2021 1749 by Angie Coelho RN  Safety Promotion/Fall Prevention: (Simultaneous filing. User may be unaware of other data.) safety round/check completed  Taken 9/1/2021 1304 by Angie Coelho RN  Safety Promotion/Fall Prevention: safety round/check completed  Taken 9/1/2021 0937 by Angie Coelho RN  Safety Promotion/Fall Prevention: safety round/check completed     Problem: Fall Injury Risk  Goal: Absence of Fall and Fall-Related Injury  Outcome: Ongoing, Progressing  Intervention: Promote Injury-Free Environment  Recent Flowsheet Documentation  Taken 9/1/2021 1749 by Angie Coelho RN  Safety Promotion/Fall Prevention: (Simultaneous filing. User may be unaware of other data.) safety round/check completed  Taken 9/1/2021 1304 by Angie Coelho RN  Safety Promotion/Fall Prevention: safety round/check completed  Taken 9/1/2021 0937 by Angie Coelho RN  Safety Promotion/Fall Prevention: safety round/check completed     Problem: Palliative Care  Goal: Enhanced Quality of Life  Outcome: Ongoing, Progressing   Goal Outcome Evaluation:

## 2021-09-01 NOTE — PROGRESS NOTES
[facility]   Hospitalist Progress Note  Date: 2021  Patient Name: Noelle Nazario  : 1940  MRN: [id number]  Date of admission: 2021      Subjective   Subjective     Chief Complaint: SOA, fever    Summary: 81 yo female with recurrent pneumonias, recently discharged from [facility], who had apparently been progressively worsening over the last 3 days prior to admission.  Febrile at home, with a recent chest x-ray showing some possible worsening of lower lobe infiltrate.  She had a telehealth visit with her PCP and was advised to come to the ED.  She was admitted to the hospital service and started on antibiotics.    Interval Followup: Patient has confusion but denies complaints.    Review of Systems   Cannot reliably get review of systems due to dementia but denies complaints    Objective   Objective     Vitals:   Temp:  [98 °F (36.7 °C)-99.4 °F (37.4 °C)] 99 °F (37.2 °C)  Heart Rate:  [] 110  Resp:  [18] 18  BP: (116-136)/() 116/64  Physical Exam    Constitutional: Awake, alert, no acute distress, nontoxic appearing   Eyes: Pupils equal, sclerae anicteric   HENT: NCAT, mucous membranes moist   Neck: Supple, trachea midline   Respiratory: dim bs    Cardiovascular: RRR, no jvd   Gastrointestinal: Positive bowel sounds, soft, nontender, nondistended   Musculoskeletal: No bilateral ankle edema, no clubbing or cyanosis to extremities   Psychiatric: Appropriate affect, cooperative   Neurologic: speech clear   Skin: No rashes     Result Review    Result Review:  I have personally reviewed the results from the time of this admission to 2021 13:06 EDT and agree with these findings:  [x]  Laboratory  []  Microbiology  []  Radiology  []  EKG/Telemetry   []  Cardiology/Vascular   []  Pathology  []  Old records  []  Other:    Assessment/Plan   Assessment / Plan     Assessment:  • Possible HCAP  • COVID ruled out   • Recent UTI  • Chronic leukocytosis  • H/o Hodgkins  lymphoma  • HTN  • Dementia    Plan:  · Continue abx for now  · Ruled out for COVID, can de-isolate  · Will consult palliative care to discuss goals of care  · May need long term care        Discussed plan with RN.    DVT prophylaxis:  Mechanical DVT prophylaxis orders are present.    CODE STATUS:        Electronically signed by Raul Loco MD, 09/01/21, 1:11 PM EDT.

## 2021-09-01 NOTE — CASE MANAGEMENT/SOCIAL WORK
Discharge Planning Assessment   [place]     Patient Name: Noelle Nazario  MRN: [id number]  Today's Date: 9/1/2021    Admit Date: 8/31/2021    Discharge Needs Assessment     Row Name 09/01/21 1109       Living Environment    Lives With  child(bill), adult    Current Living Arrangements  home/apartment/condo    Primary Care Provided by  other (see comments) caregivers    Family Caregiver if Needed  other (see comments) Pt has paid caregivers.    Quality of Family Relationships  helpful;involved;supportive    Able to Return to Prior Arrangements  yes       Resource/Environmental Concerns    Resource/Environmental Concerns  none       Transition Planning    Patient/Family Anticipates Transition to  home with help/services    Transportation Anticipated  family or friend will provide       Discharge Needs Assessment    Readmission Within the Last 30 Days  no previous admission in last 30 days    Current Outpatient/Agency/Support Group  homecare agency Pt is current with [facility].    Equipment Currently Used at Home  walker, standard;bath bench;hospital bed;power chair,(recliner lift);wheelchair Pts daughter states they have all dme needed at home.    Concerns to be Addressed  denies needs/concerns at this time    Equipment Needed After Discharge  lift device Emory asked for gait belt. [employer] notified and will provided to pt.        Discharge Plan     Row Name 09/01/21 1117       Plan    Plan  CM spoke with pts daughter Emory. Emory states the plan is for pt to return home with [facility]. Emory denies any dc needs. Pt currently on room air and covid negative. Family requested gait belt. [employer] notified.        Continued Care and Services - Admitted Since 8/31/2021    Coordination has not been started for this encounter.         Demographic Summary     Row Name 09/01/21 1105       General Information    Admission Type  inpatient    Arrived From  emergency department    Preferred Language  English      Used During This Interaction  yes Spoke with pts daughter Emory.       Contact Information    Permission Granted to Share Info With  family/designee        Functional Status     Row Name 09/01/21 4874       Functional Status    Usual Activity Tolerance  moderate    Current Activity Tolerance  fair       Functional Status, IADL    Medications  completely dependent    Meal Preparation  completely dependent    Housekeeping  completely dependent    Laundry  completely dependent    Shopping  completely dependent       Mental Status Summary    Recent Changes in Mental Status/Cognitive Functioning  unable to assess        Psychosocial    No documentation.       Abuse/Neglect    No documentation.       Legal    No documentation.       Substance Abuse    No documentation.       Patient Forms    No documentation.           Liliana Zendejas RN

## 2021-09-01 NOTE — THERAPY EVALUATION
Acute Care - Physical Therapy Initial Evaluation  [facility]     Patient Name: Noelle Nazario  : 1940  MRN: [id number]  Today's Date: 2021      Visit Dx:     ICD-10-CM ICD-9-CM   1. HCAP (healthcare-associated pneumonia)  J18.9 486   2. Decreased activities of daily living (ADL)  Z78.9 V49.89   3. Oropharyngeal dysphagia  R13.12 787.22   4. Difficulty walking  R26.2 719.7     Patient Active Problem List   Diagnosis   • Pancytopenia (CMS/HCC)   • Arthritis   • Breast mass, right   • Cataract   • Dementia (CMS/HCC)   • Depressive disorder, not elsewhere classified   • Esophageal reflux   • Hyperlipemia   • Hypokalemia   • Hyponatremia   • Hodgkin's disease in adult (CMS/HCC)   • HTN (hypertension)   • Long term (current) use of anticoagulants   • Pulmonary embolism (CMS/HCC)   • Stroke (CMS/HCC)   • Balance disorder   • Sepsis (CMS/HCC)   • Pneumonia   • Lactic acidosis   • Severe malnutrition (CMS/HCC)   • Recurrent UTI   • HCAP (healthcare-associated pneumonia)     Past Medical History:   Diagnosis Date   • Allergies    • Arthritis    • Blood disease    • Broken bones    • Cancer (CMS/HCC)    • Cataract    • Chest pain     stress by Dr. Medrano in 2015 was normal, beacause of her risk fasctors he recommended proceeding with cath, however she is reluctant to do this at this time, if chest pain worsents she will consider cath in the future   • Dementia (CMS/HCC)    • Disease of thyroid gland    • Emphysema, unspecified (CMS/HCC)    • GERD (gastroesophageal reflux disease)    • History of transfusion    • Hodgkin's lymphoma (CMS/HCC)    • HTN (hypertension)    • Hyperlipemia    • Long term (current) use of anticoagulants    • Night sweats    • Pulmonary embolism (CMS/HCC)    • Stroke (CMS/HCC)      Past Surgical History:   Procedure Laterality Date   • BREAST BIOPSY     • COLONOSCOPY     • EYE SURGERY     • HYSTERECTOMY          PT Assessment (last 12 hours)      PT Evaluation and Treatment     Row Name  09/01/21 1600          Physical Therapy Time and Intention    Subjective Information  no complaints  -CS     Document Type  evaluation  -CS     Mode of Treatment  individual therapy;physical therapy  -CS     Patient Effort  poor  -CS     Row Name 09/01/21 1600          General Information    Patient Profile Reviewed  yes  -CS     Patient Observations  alert;cooperative  -CS     Prior Level of Function  min assist:;gait;transfer;ADL's;all household mobility;independent:;bed mobility Patient has hired caregivers to assist at home.  -CS     Equipment Currently Used at Home  walker, rolling;wheelchair;power chair, (recliner lift);shower chair;commode, 3-in-1  -CS     Existing Precautions/Restrictions  fall  -CS     Barriers to Rehab  cognitive status  -CS     Row Name 09/01/21 1600          Living Environment    Current Living Arrangements  home/apartment/condo  -CS     Lives With  child(bill), adult  -CS     Row Name 09/01/21 1600          Cognition    Orientation Status (Cognition)  oriented to;person;verbal cues/prompts needed for orientation  -CS     Row Name 09/01/21 1600          Range of Motion Comprehensive    General Range of Motion  bilateral lower extremity ROM WFL  -CS     Row Name 09/01/21 1600          Strength Comprehensive (MMT)    General Manual Muscle Testing (MMT) Assessment  lower extremity strength deficits identified  -CS     Comment, General Manual Muscle Testing (MMT) Assessment  3/5  -CS     Row Name 09/01/21 1600          Bed Mobility    Bed Mobility  rolling left;rolling right;supine-sit  -CS     Rolling Left [place] (Bed Mobility)  minimum assist (75% patient effort)  -CS     Rolling Right [place] (Bed Mobility)  minimum assist (75% patient effort)  -CS     Supine-Sit [place] (Bed Mobility)  moderate assist (50% patient effort);maximum assist (25% patient effort) supine to long sitting  -CS     Bed Mobility, Safety Issues  cognitive deficits limit understanding;decreased use of  legs for bridging/pushing;decreased use of arms for pushing/pulling  -     Assistive Device (Bed Mobility)  bed rails  -     Row Name 09/01/21 1600          Transfers    Comment (Transfers)  Unable to assess due to decreased safety  -     Row Name 09/01/21 1600          Gait/Stairs (Locomotion)    Comment (Gait/Stairs)  Unable to assess due to decreased safety  -     Row Name 09/01/21 1600          Safety Issues, Functional Mobility    Safety Issues Affecting Function (Mobility)  ability to follow commands;insight into deficits/self-awareness;awareness of need for assistance  -CS     Impairments Affecting Function (Mobility)  balance;cognition;endurance/activity tolerance;strength  -[facility] Name 09/01/21 1600          Plan of Care Review    Plan of Care Reviewed With  patient  -CS     Progress  no change  -     Outcome Summary  Pt presents with limitations that impede their ability to safely and independently transfer and ambulate. The skills of a therapist will be required to safely and effectively implement the following treatment plan to restore maximal level of function.  -[facility] Name 09/01/21 1600          Physical Therapy Goals    Bed Mobility Goal Selection (PT)  bed mobility, PT goal 1  -CS     Transfer Goal Selection (PT)  transfer, PT goal 1  -CS     Gait Training Goal Selection (PT)  gait training, PT goal 1  -     ROM Goal Selection (PT)  --  -     Strength Goal Selection (PT)  strength, PT goal 1  -[facility] Name 09/01/21 1600          Bed Mobility Goal 1 (PT)    Activity/Assistive Device (Bed Mobility Goal 1, PT)  bed mobility activities, all  -CS     [place] Level/Cues Needed (Bed Mobility Goal 1, PT)  modified independence  -     Time Frame (Bed Mobility Goal 1, PT)  long term goal (LTG);10 days  -[facility] Name 09/01/21 1600          Transfer Goal 1 (PT)    Activity/Assistive Device (Transfer Goal 1, PT)  sit-to-stand/stand-to-sit;bed-to-chair/chair-to-bed  -CS      [place] Level/Cues Needed (Transfer Goal 1, PT)  contact guard assist;standby assist  -CS     Time Frame (Transfer Goal 1, PT)  long term goal (LTG);10 days  -CS     Row Name 09/01/21 1600          Gait Training Goal 1 (PT)    Activity/Assistive Device (Gait Training Goal 1, PT)  gait (walking locomotion);walker, rolling  -CS     [place] Level (Gait Training Goal 1, PT)  contact guard assist  -CS     Distance (Gait Training Goal 1, PT)  25  -CS     Time Frame (Gait Training Goal 1, PT)  long term goal (LTG);10 days  -CS     Row Name 09/01/21 1600          Strength Goal 1 (PT)    Strength Goal 1 (PT)  Patient will demonstrate improvement with bilateral lower extremity strength to 4+/5.  -CS     Time Frame (Strength Goal 1, PT)  long term goal (LTG);10 days  -CS     Row Name 09/01/21 1600          Therapy Assessment/Plan (PT)    Rehab Potential (PT)  fair, will monitor progress closely  -CS     Criteria for Skilled Interventions Met (PT)  yes;skilled treatment is necessary  -CS     Predicted Duration of Therapy Intervention (PT)  10 days  -CS     Problem List (PT)  problems related to;balance;mobility;strength  -CS     Activity Limitations Related to Problem List (PT)  unable to ambulate safely;unable to transfer safely  -CS     Row Name 09/01/21 1600          PT Evaluation Complexity    History, PT Evaluation Complexity  1-2 personal factors and/or comorbidities  -CS     Examination of Body Systems (PT Eval Complexity)  total of 4 or more elements  -CS     Clinical Presentation (PT Evaluation Complexity)  stable  -CS     Clinical Decision Making (PT Evaluation Complexity)  low complexity  -CS     Overall Complexity (PT Evaluation Complexity)  low complexity  -CS     Row Name 09/01/21 1600          Therapy Plan Review/Discharge Plan (PT)    Therapy Plan Review (PT)  patient  -CS       User Key  (r) = Recorded By, (t) = Taken By, (c) = Cosigned By    Initials Name Provider Type    CS Xiao Gamboa, PT  Physical Therapist          PT Recommendation and Plan  Anticipated Discharge Disposition (PT): home with 24/7 care, home with home health, sub acute care setting, extended care facility  Planned Therapy Interventions (PT): balance training, bed mobility training, gait training, transfer training, strengthening  Therapy Frequency (PT): daily  Plan of Care Reviewed With: patient  Progress: no change  Outcome Summary: Pt presents with limitations that impede their ability to safely and independently transfer and ambulate. The skills of a therapist will be required to safely and effectively implement the following treatment plan to restore maximal level of function.  Outcome Measures     Row Name 09/01/21 1600             How much help from another person do you currently need...    Turning from your back to your side while in flat bed without using bedrails?  3  -CS      Moving from lying on back to sitting on the side of a flat bed without bedrails?  3  -CS      Moving to and from a bed to a chair (including a wheelchair)?  1  -CS      Standing up from a chair using your arms (e.g., wheelchair, bedside chair)?  2  -CS      Climbing 3-5 steps with a railing?  1  -CS      To walk in hospital room?  1  -CS      AM-PAC 6 Clicks Score (PT)  11  -CS         Functional Assessment    Outcome Measure Options  AM-PAC 6 Clicks Basic Mobility (PT)  -CS        User Key  (r) = Recorded By, (t) = Taken By, (c) = Cosigned By    Initials Name Provider Type    Xiao Major, PT Physical Therapist           Time Calculation:   PT Charges     Row Name 09/01/21 1643             Time Calculation    PT Received On  09/01/21  -CS      PT Goal Re-Cert Due Date  09/10/21  -CS         Untimed Charges    PT Eval/Re-eval Minutes  46  -CS         Total Minutes    Untimed Charges Total Minutes  46  -CS       Total Minutes  46  -CS        User Key  (r) = Recorded By, (t) = Taken By, (c) = Cosigned By    Initials Name Provider Type    MARK Gamboa  Xiao, PT Physical Therapist        Therapy Charges for Today     Code Description Service Date Service Provider Modifiers Qty    [id number] HC PT EVAL LOW COMPLEXITY 4 9/1/2021 Xiao Gamboa, PT GP 1          PT G-Codes  Outcome Measure Options: AM-PAC 6 Clicks Basic Mobility (PT)  AM-PAC 6 Clicks Score (PT): 11  AM-PAC 6 Clicks Score (OT): 10    Xiao Gamboa PT  9/1/2021

## 2021-09-01 NOTE — CONSULTS
"Nutrition Services    Patient Name: Noelle Nazario  YOB: 1940  MRN: [id number]  Admission date: 8/31/2021      CLINICAL NUTRITION ASSESSMENT      Reason for Assessment  MST score 2+     H&P:    Past Medical History:   Diagnosis Date   • Allergies    • Arthritis    • Blood disease    • Broken bones    • Cancer (CMS/HCC)    • Cataract    • Chest pain     stress by Dr. Medrano in 12/2015 was normal, beacause of her risk fasctors he recommended proceeding with cath, however she is reluctant to do this at this time, if chest pain worsents she will consider cath in the future   • Dementia (CMS/HCC)    • Disease of thyroid gland    • Emphysema, unspecified (CMS/HCC)    • GERD (gastroesophageal reflux disease)    • History of transfusion    • Hodgkin's lymphoma (CMS/HCC)    • HTN (hypertension)    • Hyperlipemia    • Long term (current) use of anticoagulants    • Night sweats    • Pulmonary embolism (CMS/HCC)    • Stroke (CMS/HCC)         Current Problems:   Active Hospital Problems    Diagnosis    • HCAP (healthcare-associated pneumonia)         Nutrition/Diet History         Narrative     Patient reviewed related to MST score of 3 for reported decreased appetite/intake, unsure of weight loss. Per chart review, patient has some confusion and is unable to answer questions appropriately. In isolation for covid r/o. Patient has had 0-25% po intake since admission. No evidence of weight loss per chart review of recorded weight history from recent visits, patient has actually steadily gained some weight over the last month. Due to poor po intake since admission, patient would benefit from oral nutrition supplements.   Boost Plus vanilla/strawberry has been ordered for patient TID w/ meals. Will provide an additional 1080 kcal and 42 g protein daily. RD will continue to monitor and follow per protocol.     Anthropometrics        Current Height, Weight Height: 157.5 cm (62\")  Weight: 63.7 kg (140 lb 6.9 oz) " (entering same weight since pt arrived last night)   Current BMI Body mass index is 25.69 kg/m².       Weight Hx  Wt Readings from Last 30 Encounters:   09/01/21 0500 63.7 kg (140 lb 6.9 oz)   09/01/21 0036 63.7 kg (140 lb 6.9 oz)   08/31/21 1234 61.2 kg (135 lb)   08/27/21 1000 61.2 kg (135 lb)   08/22/21 0500 60.1 kg (132 lb 7.9 oz)   08/21/21 0537 60.2 kg (132 lb 11.5 oz)   08/20/21 0500 60 kg (132 lb 4.4 oz)   08/19/21 0553 60.3 kg (132 lb 15 oz)   08/18/21 0500 59.9 kg (132 lb 0.9 oz)   08/17/21 0529 59.2 kg (130 lb 8.2 oz)   08/16/21 0512 59.2 kg (130 lb 8.2 oz)   08/15/21 0500 59.1 kg (130 lb 4.7 oz)   08/14/21 0500 57.8 kg (127 lb 6.8 oz)   08/13/21 0500 54.6 kg (120 lb 5.9 oz)   08/12/21 0500 56 kg (123 lb 7.3 oz)   08/11/21 0527 55.9 kg (123 lb 3.8 oz)   08/11/21 0143 55.9 kg (123 lb 3.8 oz)   08/10/21 0500 57 kg (125 lb 10.6 oz)   08/09/21 0600 59.8 kg (131 lb 13.4 oz)   08/08/21 0500 58.9 kg (129 lb 13.6 oz)   08/07/21 0500 57.5 kg (126 lb 12.2 oz)   08/06/21 0400 63.1 kg (139 lb 1.8 oz)   08/05/21 0500 62.3 kg (137 lb 5.6 oz)   08/04/21 0552 63 kg (138 lb 14.2 oz)   08/03/21 0534 61.9 kg (136 lb 7.4 oz)   08/02/21 0506 62.3 kg (137 lb 5.6 oz)   08/01/21 0542 63 kg (138 lb 14.2 oz)   07/31/21 0300 62.3 kg (137 lb 5.6 oz)   07/30/21 0542 63.7 kg (140 lb 6.9 oz)   07/29/21 0500 63.9 kg (140 lb 14 oz)   07/28/21 2157 64 kg (141 lb 1.5 oz)   07/27/21 0517 59.2 kg (130 lb 8.2 oz)   07/22/21 0550 68 kg (149 lb 14.6 oz)   07/21/21 1621 69.6 kg (153 lb 7 oz)   06/09/21 1601 66.6 kg (146 lb 12.8 oz)   05/04/21 0000 66 kg (145 lb 8 oz)   04/14/21 0000 65.3 kg (144 lb)   04/08/21 0000 65.3 kg (144 lb)   02/03/21 0000 64.1 kg (141 lb 4 oz)   12/15/20 0000 63.2 kg (139 lb 4 oz)   12/04/20 0000 63.7 kg (140 lb 8 oz)   09/02/20 0000 63.6 kg (140 lb 4 oz)   08/25/20 0000 64.6 kg (142 lb 8 oz)   07/09/20 0000 65.8 kg (145 lb 2 oz)   03/09/20 0000 64.9 kg (143 lb)   12/18/19 0000 64.5 kg (142 lb 4 oz)   12/06/19 0000 65.4  kg (144 lb 2 oz)   10/14/19 0000 64.9 kg (143 lb)   09/09/19 0000 66.7 kg (147 lb)   05/02/19 0000 66.8 kg (147 lb 4 oz)   02/01/19 0000 65.8 kg (145 lb)   10/30/18 0000 64 kg (141 lb)   07/30/18 0000 61.9 kg (136 lb 6 oz)   04/30/18 0000 62.3 kg (137 lb 6 oz)   02/16/18 0000 61 kg (134 lb 8 oz)   02/06/18 0000 60.4 kg (133 lb 2 oz)              Estimated/Assessed Needs       Energy Requirements    EST Needs (kcal/day) 4590-4274 kcal        Protein Requirements    EST Daily Needs (g/day) 65-80 g protein        Fluid Requirements     Estimated Needs (mL/day) 3186-7282 ml fluid      Labs/Medications         Pertinent Labs Reviewed.   Results from last 7 days   Lab Units 09/01/21  0548 08/31/21  1239   SODIUM mmol/L 139 139   POTASSIUM mmol/L 3.9 3.8   CHLORIDE mmol/L 107 104   CO2 mmol/L 21.7* 23.8   BUN mg/dL 15 19   CREATININE mg/dL 0.52* 0.57   CALCIUM mg/dL 9.3 10.0   BILIRUBIN mg/dL 0.3 0.3   ALK PHOS U/L 66 74   ALT (SGPT) U/L 9 10   AST (SGOT) U/L 15 14   GLUCOSE mg/dL 104* 113*     Results from last 7 days   Lab Units 09/01/21  0548 08/31/21  1239 08/31/21  1239   MAGNESIUM mg/dL 1.7  --  1.9   PHOSPHORUS mg/dL 3.8  --   --    HEMOGLOBIN g/dL 9.8*   < > 11.0*   HEMATOCRIT % 31.8*   < > 35.7    < > = values in this interval not displayed.     COVID19   Date Value Ref Range Status   08/31/2021 Not Detected Not Detected - Ref. Range Final     Lab Results   Component Value Date    HGBA1C 6.1 (H) 04/06/2020         Pertinent Medications Reviewed.     Current Nutrition Orders & Evaluation of Intake       Oral Nutrition     Current PO Diet Diet Soft Texture; Whole Foods; Thin   Supplement Orders Placed This Encounter      Dietary Nutrition Supplements Boost Plus (Ensure Plus); vanilla       Nutrition Diagnosis         Nutrition Dx Problem 1 Inadequate energy Intake related to inadequate oral Intake as evidenced by decreased appetite., per nursing documentation. and report of minimal PO intake.       Nutrition  Intervention         Boost Plus TID      Monitor/Evaluation        Monitor monitor/eval: Per protocol, I&O, PO intake, Supplement intake, Pertinent labs, Weight, Symptoms, POC/GOC       Nutrition Discharge Plan         To be determined       Electronically signed by:  Dorcas Link RD  09/01/21 13:38 EDT

## 2021-09-01 NOTE — PROGRESS NOTES
"Pharmacy to Dose Vancomycin Day: 2   80 y.o.female      Consulting Provider: Darian  Clinical Indication: PNA  Pertinent Past Medical History: hyperlipidemia,hypothyroidism,GERD,HTN, PE,Hodgkin's lymphoma  Goal -600 mg/L.hr  Duration of therapy: 7    157.5 cm (62\")       09/01/21  0500      Weight: 63.7 kg (140 lb 6.9 oz) (entering same weight since pt arrived last night)         Estimated Creatinine Clearance: 48.3 mL/min (A) (by C-G formula based on SCr of 0.52 mg/dL (L)).  Results from last 7 days   Lab Units 09/01/21  0548 08/31/21  1239   BUN mg/dL 15 19   CREATININE mg/dL 0.52* 0.57       HD/PD/CRRT?: no    Lab Results   Component Value Date    WBC 8.81 09/01/2021      Temperature    08/31/21 1234 09/01/21 0036 09/01/21 0333   Temp: 98.5 °F (36.9 °C) 99.4 °F (37.4 °C) 98 °F (36.7 °C)        Contrast Administered: no    Relevant Micro:   Microbiology Results (last 10 days)       Procedure Component Value - Date/Time    MRSA Screen, PCR (Inpatient) - Swab, Nares [[id number]]  (Normal) Collected: 09/01/21 0308    Lab Status: Final result Specimen: Swab from Nares Updated: 09/01/21 0438     MRSA PCR No MRSA Detected    S. Pneumo Ag Urine or CSF - Urine, Urine, Clean Catch [[id number]]  (Normal) Collected: 09/01/21 0118    Lab Status: Final result Specimen: Urine, Clean Catch Updated: 09/01/21 0604     Strep Pneumo Ag Negative    Legionella Antigen, Urine - Urine, Urine, Clean Catch [[id number]]  (Normal) Collected: 09/01/21 0118    Lab Status: Final result Specimen: Urine, Clean Catch Updated: 09/01/21 0604     LEGIONELLA ANTIGEN, URINE Negative    Mycoplasma Pneumoniae Antibody, IgM - Blood, Arm, Left [[id number]]  (Normal) Collected: 08/31/21 2131    Lab Status: Final result Specimen: Blood from Arm, Left Updated: 08/31/21 2250     Mycoplasma pneumo IgM Negative    Influenza Antigen, Rapid - Swab, Nasopharynx [[id number]]  (Normal) Collected: 08/31/21 1920    Lab Status: Final result Specimen: Swab from " Nasopharynx Updated: 08/31/21 2006     Influenza A Ag, EIA Negative     Influenza B Ag, EIA Negative    Urine Culture - Urine, Urine, Clean Catch [[id number]] Collected: 08/27/21 1045    Lab Status: Final result Specimen: Urine, Clean Catch Updated: 08/28/21 1623     Urine Culture <25,000 CFU/mL Mixed Lisa Isolated    Narrative:      Specimen contains mixed organisms of questionable pathogenicity which indicates contamination with commensal lisa.  Further identification is unlikely to provide clinically useful information.  Suggest recollection.                   CT Chest Without Contrast Diagnostic    Result Date: 8/31/2021  PROCEDURE: CT CHEST WO CONTRAST DIAGNOSTIC  COMPARISONS: [facility], CR, XR CHEST PA AND LATERAL, 8/27/2021, 11:00.   [facility], CR, XR CHEST 1 VW, 8/31/2021, 13:29.   [facility], CT, CT ABDOMEN PELVIS W CONTRAST, 7/21/2021, 18:04.   [facility], CT, CT ABDOMEN PELVIS W CONTRAST, 6/07/2021, 10:30.   [facility], CT, CT CHEST WO CONTRAST DIAGNOSTIC, 8/16/2021, 15:40.  INDICATIONS: Pneumonia, effusion or abscess suspected, x-ray done.  TECHNIQUE: 524 CT images were created without the administration of contrast material.   PROTOCOL:   Standard imaging protocol performed    RADIATION:   DLP: 187.4mGy*cm   Automated exposure control was utilized to minimize radiation dose.  FINDINGS: No significant interval change is seen in the nodular opacities in the bilateral lung apices, probably greater on the right, since 8/16/2021.  There may be peribronchial thickening, probably greater on the right.  Atelectasis and/or fibrosis is (are) suggested in the lung bases.  Acute infiltrate is thought to be less likely.  No pneumothorax or pneumomediastinum.  Atherosclerotic changes are noted, including involvement of the thoracic aorta and the great arteries.  Minimal, if any, coronary artery calcification is seen.  The  technique was not tailored to evaluate the coronary arteries.  A trace amount of pericardial effusion is possible.  There may be borderline cardiac enlargement.  There may be minimal if any pleural effusion.  A tiny hiatal hernia is possible.  No acute findings are seen in the partially imaged upper abdomen.  Residual oral contrast agent may be present in the colon (versus ingested hyperdense medication).  Small to moderate sized mediastinal lymph nodes are seen.  Some of these are calcified.  Similar findings were seen previously.  There are degenerative changes throughout the thoracic spine.  There is an age-indeterminate but probably subacute to chronic superior endplate vertebral compression fracture at approximately T9 with less than 20 percent decrease in its craniocaudal extent, such as seen on image 96 of series 203 and adjacent images, seen previously.  There is minimal if any posterior wall retropulsion associated with this finding.  No new acute fracture is suspected.  CONCLUSION: No significant interval change is seen by nonenhanced CT examination of the chest since the 8/16/2021 study with findings as detailed above.  No new infiltrates are seen.  No significant pleural effusion.  A small pericardial effusion is identified.    SARINA CASTRO JR, MD       Electronically Signed and Approved By: SARINA CASTRO JR, MD on 8/31/2021 at 21:02                          XR Chest 1 View    Result Date: 8/31/2021  PROCEDURE: XR CHEST 1 VW  COMPARISON: [facility], CT, CT CHEST WO CONTRAST DIAGNOSTIC, 8/16/2021, 15:40.  [facility], CR, XR CHEST PA AND LATERAL, 8/27/2021, 11:00.  INDICATIONS: Weak/Dizzy/AMS triage protocol  FINDINGS:  The heart remains normal in size.  The lungs are well-expanded.  Patchy airspace disease in the right lower lobe is unchanged.  New mild airspace disease is seen in the left lower lobe.  This may represent pneumonia or atelectasis.  CONCLUSION:   Mild bilateral lower lobe infiltrate or atelectasis.       ILA WALLACE MD       Electronically Signed and Approved By: ILA WALLACE MD on 8/31/2021 at 14:06                                          Other Antimicrobial Therapy: cefepime    Assessment/Plan  Loading dose: vancomycin 1250 mg iv x 1  Regimen: 750mg iv q12  Exposure Target: AUC24 (range) 400-600 mg/L.hr  AUC24,ss: 435 mg/L.hr  PAUC*: 59 %  Ctrough,ss: 13.5 mg/L  Pconc*: 19 %  Tox.: 9 %    Trough Level ordered for am  Labs ordered: BMP for am

## 2021-09-02 NOTE — ACP (ADVANCE CARE PLANNING)
EMS DNR completed and placed in the chart.  Copy was provided to Kd, patient's son.  Janna Gomez, ELN, RN, CHPN

## 2021-09-02 NOTE — THERAPY TREATMENT NOTE
Patient Name: Noelle Nazario  : 1940    MRN: [id number]                              Today's Date: 2021       Admit Date: 2021    Visit Dx:     ICD-10-CM ICD-9-CM   1. HCAP (healthcare-associated pneumonia)  J18.9 486   2. Decreased activities of daily living (ADL)  Z78.9 V49.89   3. Oropharyngeal dysphagia  R13.12 787.22   4. Difficulty walking  R26.2 719.7     Patient Active Problem List   Diagnosis   • Pancytopenia (CMS/HCC)   • Arthritis   • Breast mass, right   • Cataract   • Dementia (CMS/HCC)   • Depressive disorder, not elsewhere classified   • Esophageal reflux   • Hyperlipemia   • Hypokalemia   • Hyponatremia   • Hodgkin's disease in adult (CMS/HCC)   • HTN (hypertension)   • Long term (current) use of anticoagulants   • Pulmonary embolism (CMS/HCC)   • Stroke (CMS/HCC)   • Balance disorder   • Sepsis (CMS/HCC)   • Pneumonia   • Lactic acidosis   • Severe malnutrition (CMS/HCC)   • Recurrent UTI   • HCAP (healthcare-associated pneumonia)     Past Medical History:   Diagnosis Date   • Allergies    • Arthritis    • Blood disease    • Broken bones    • Cancer (CMS/HCC)    • Cataract    • Chest pain     stress by Dr. Medrano in 2015 was normal, beacause of her risk fasctors he recommended proceeding with cath, however she is reluctant to do this at this time, if chest pain worsents she will consider cath in the future   • Dementia (CMS/HCC)    • Disease of thyroid gland    • Emphysema, unspecified (CMS/HCC)    • Female bladder prolapse    • GERD (gastroesophageal reflux disease)    • History of transfusion    • Hodgkin's lymphoma (CMS/HCC)    • HTN (hypertension)    • Hyperlipemia    • Long term (current) use of anticoagulants    • Night sweats    • Pulmonary embolism (CMS/HCC)    • Stroke (CMS/HCC)      Past Surgical History:   Procedure Laterality Date   • BREAST BIOPSY     • COLONOSCOPY     • EYE SURGERY     • HYSTERECTOMY       General Information     Row Name 21 1117          OT  Time and Intention    Document Type  therapy note (daily note)  -PG     Mode of Treatment  individual therapy;occupational therapy  -PG       User Key  (r) = Recorded By, (t) = Taken By, (c) = Cosigned By    Initials Name Provider Type    PG Shaquille Landeros OT Occupational Therapist          Mobility/ADL's     Row Name 09/02/21 1120          Activities of Daily Living    BADL Assessment/Intervention  grooming brushing teeth with setup and cues to stay on task  -PG       User Key  (r) = Recorded By, (t) = Taken By, (c) = Cosigned By    Initials Name Provider Type    PG Shaquille Landeros OT Occupational Therapist        Obj/Interventions    No documentation.       Goals/Plan    No documentation.       Clinical Impression     Row Name 09/02/21 1121          Plan of Care Review    Progress  no change  -PG       User Key  (r) = Recorded By, (t) = Taken By, (c) = Cosigned By    Initials Name Provider Type    PG Shaquille Landeros OT Occupational Therapist        Outcome Measures     Row Name 09/02/21 1121          How much help from another is currently needed...    Putting on and taking off regular lower body clothing?  1  -PG     Bathing (including washing, rinsing, and drying)  1  -PG     Toileting (which includes using toilet bed pan or urinal)  1  -PG     Putting on and taking off regular upper body clothing  1  -PG     Taking care of personal grooming (such as brushing teeth)  3  -PG     Eating meals  3  -PG     AM-PAC 6 Clicks Score (OT)  10  -PG     Row Name 09/02/21 0929 09/02/21 0800       How much help from another person do you currently need...    Turning from your back to your side while in flat bed without using bedrails?  2  -DK  2  -RP    Moving from lying on back to sitting on the side of a flat bed without bedrails?  2  -DK  2  -RP    Moving to and from a bed to a chair (including a wheelchair)?  1  -DK  1  -RP    Standing up from a chair using your arms (e.g., wheelchair, bedside chair)?  2  -DK  2  -RP     Climbing 3-5 steps with a railing?  1  -DK  1  -RP    To walk in hospital room?  1  -DK  1  -RP    AM-PAC 6 Clicks Score (PT)  9  -DK  9  -RP    Row Name 09/02/21 1121 09/02/21 0929       Functional Assessment    Outcome Measure Options  AM-PAC 6 Clicks Daily Activity (OT);Optimal Instrument  -PG  AM-PAC 6 Clicks Basic Mobility (PT)  -DK    Row Name 09/02/21 1121          Optimal Instrument    Optimal Instrument  Optimal - 3  -PG     Bending/Stooping  5  -PG     Reaching  2  -PG       User Key  (r) = Recorded By, (t) = Taken By, (c) = Cosigned By    Initials Name Provider Type    RP Millie Reyes, RN Registered Nurse    Katelin Trejo PTA Physical Therapy Assistant    Shaquille Schuler OT Occupational Therapist          Occupational Therapy Education                 Title: PT OT SLP Therapies (In Progress)     Topic: Occupational Therapy (Done)     Point: ADL training (Done)     Description:   Instruct learner(s) on proper safety adaptation and remediation techniques during self care or transfers.   Instruct in proper use of assistive devices.              Learning Progress Summary           Patient Acceptance, E,D, VU,DU by PG at 9/1/2021 0820                   Point: Home exercise program (Done)     Description:   Instruct learner(s) on appropriate technique for monitoring, assisting and/or progressing therapeutic exercises/activities.              Learning Progress Summary           Patient Acceptance, E,D, VU,DU by PG at 9/1/2021 0820                   Point: Precautions (Done)     Description:   Instruct learner(s) on prescribed precautions during self-care and functional transfers.              Learning Progress Summary           Patient Acceptance, E,D, VU,DU by PG at 9/1/2021 0820                   Point: Body mechanics (Done)     Description:   Instruct learner(s) on proper positioning and spine alignment during self-care, functional mobility activities and/or exercises.              Learning Progress  Summary           Patient Acceptance, E,D, VU,DU by PG at 9/1/2021 0820                               User Key     Initials Effective Dates Name Provider Type Discipline    PG 06/16/21 -  Shaquille Landeros OT Occupational Therapist OT              OT Recommendation and Plan  Planned Therapy Interventions (OT): activity tolerance training, BADL retraining, occupation/activity based interventions, strengthening exercise, transfer/mobility retraining  Therapy Frequency (OT): 5 times/wk  Plan of Care Review  Plan of Care Reviewed With: patient  Progress: no change  Outcome Summary: Patient presents with limitations affecting strength, activity tolerance, and balance impacting patient's ability to return home safely and independently.  The skills of a therapist will be required to safely and effectively implement the following treatment plan to restore maximal level of function     Time Calculation:   Time Calculation- OT     Row Name 09/02/21 1122             Time Calculation- OT    OT Received On  09/02/21  -PG         Timed Charges    84211 - OT Self Care/Mgmt Minutes  10  -PG         Total Minutes    Timed Charges Total Minutes  10  -PG       Total Minutes  10  -PG        User Key  (r) = Recorded By, (t) = Taken By, (c) = Cosigned By    Initials Name Provider Type    PG Shaquille Landeros OT Occupational Therapist        Therapy Charges for Today     Code Description Service Date Service Provider Modifiers Qty    26979913370 HC OT EVAL LOW COMPLEXITY 3 9/1/2021 Shaquille Landeros OT GO 1    55237054632 HC OT SELF CARE/MGMT/TRAIN EA 15 MIN 9/2/2021 Shaquilel Landeros OT GO 1               Shaquille Landeros OT  9/2/2021

## 2021-09-02 NOTE — THERAPY TREATMENT NOTE
Acute Care - Physical Therapy Treatment Note   [place]     Patient Name: Noelle Nazario  : 1940  MRN: [id number]  Today's Date: 2021      Visit Dx:     ICD-10-CM ICD-9-CM   1. HCAP (healthcare-associated pneumonia)  J18.9 486   2. Decreased activities of daily living (ADL)  Z78.9 V49.89   3. Oropharyngeal dysphagia  R13.12 787.22   4. Difficulty walking  R26.2 719.7     Patient Active Problem List   Diagnosis   • Pancytopenia (CMS/HCC)   • Arthritis   • Breast mass, right   • Cataract   • Dementia (CMS/HCC)   • Depressive disorder, not elsewhere classified   • Esophageal reflux   • Hyperlipemia   • Hypokalemia   • Hyponatremia   • Hodgkin's disease in adult (CMS/HCC)   • HTN (hypertension)   • Long term (current) use of anticoagulants   • Pulmonary embolism (CMS/HCC)   • Stroke (CMS/HCC)   • Balance disorder   • Sepsis (CMS/HCC)   • Pneumonia   • Lactic acidosis   • Severe malnutrition (CMS/HCC)   • Recurrent UTI   • HCAP (healthcare-associated pneumonia)     Past Medical History:   Diagnosis Date   • Allergies    • Arthritis    • Blood disease    • Broken bones    • Cancer (CMS/HCC)    • Cataract    • Chest pain     stress by Dr. Medrano in 2015 was normal, beacause of her risk fasctors he recommended proceeding with cath, however she is reluctant to do this at this time, if chest pain worsents she will consider cath in the future   • Dementia (CMS/HCC)    • Disease of thyroid gland    • Emphysema, unspecified (CMS/HCC)    • Female bladder prolapse    • GERD (gastroesophageal reflux disease)    • History of transfusion    • Hodgkin's lymphoma (CMS/HCC)    • HTN (hypertension)    • Hyperlipemia    • Long term (current) use of anticoagulants    • Night sweats    • Pulmonary embolism (CMS/HCC)    • Stroke (CMS/HCC)      Past Surgical History:   Procedure Laterality Date   • BREAST BIOPSY     • COLONOSCOPY     • EYE SURGERY     • HYSTERECTOMY          PT Assessment (last 12 hours)      PT Evaluation  and Treatment     Row Name 09/02/21 0930          Physical Therapy Time and Intention    Subjective Information  no complaints  -DK     Document Type  therapy note (daily note)  -DK     Mode of Treatment  individual therapy;physical therapy  -DK     Patient Effort  good  -DK     Symptoms Noted During/After Treatment  none  -DK     Row Name 09/02/21 0930          Cognition    Affect/Mental Status (Cognitive)  confused;anxious;low arousal/lethargic  -DK     Behavioral Issues (Cognitive)  overwhelmed easily  -DK     Orientation Status (Cognition)  oriented to;person  -DK     Follows Commands (Cognition)  WFL  -DK     Cognitive Function (Cognitive)  WFL  -DK     Personal Safety Interventions  supervised activity  -DK     Row Name 09/02/21 0930          Pain Scale: Word Pre/Post-Treatment    Pain: Word Scale, Pretreatment  0 - no pain  -DK     Posttreatment Pain Rating  0 - no pain  -DK     Row Name 09/02/21 0930          Bed Mobility    Bed Mobility  supine-sit-supine  -DK     Rolling Left [place] (Bed Mobility)  maximum assist (25% patient effort);1 person assist  -DK     Rolling Right [place] (Bed Mobility)  maximum assist (25% patient effort);1 person assist  -DK     Supine-Sit [place] (Bed Mobility)  maximum assist (25% patient effort);1 person assist  -DK     Supine-Sit-Supine [place] (Bed Mobility)  maximum assist (25% patient effort);1 person assist  -DK     Row Name 09/02/21 0930          Transfers    Transfers  sit-stand transfer;stand-sit transfer  -DK     Row Name 09/02/21 0930          Safety Issues, Functional Mobility    Safety Issues Affecting Function (Mobility)  impulsivity;judgment;safety precaution awareness;awareness of need for assistance  -DK     Impairments Affecting Function (Mobility)  balance;cognition;endurance/activity tolerance;strength  -DK     Cognitive Impairments, Mobility Safety/Performance  awareness, need for assistance;impulsivity;judgment;safety precaution  awareness  -DK     Row Name 09/02/21 0930          Balance    Balance Assessment  sitting static balance  -DK     Static Sitting Balance  mild impairment;supported;sitting, edge of bed  -DK     Balance Interventions  sitting;static  -DK     Row Name 09/02/21 0930          Motor Skills    Motor Skills  therapeutic exercise  -DK     Therapeutic Exercise  hip;knee;ankle  -DK     Row Name 09/02/21 0930          Hip (Therapeutic Exercise)    Hip (Therapeutic Exercise)  AAROM (active assistive range of motion)  -DK     Hip AAROM (Therapeutic Exercise)  bilateral;flexion;extension;aBduction;aDduction;supine;10 repetitions;2 sets  -DK     Row Name 09/02/21 0930          Knee (Therapeutic Exercise)    Knee (Therapeutic Exercise)  AAROM (active assistive range of motion)  -DK     Knee AAROM (Therapeutic Exercise)  bilateral;flexion;extension;supine;10 repetitions;2 sets  -     Row Name 09/02/21 0930          Ankle (Therapeutic Exercise)    Ankle (Therapeutic Exercise)  AAROM (active assistive range of motion)  -DK     Ankle AAROM (Therapeutic Exercise)  bilateral;dorsiflexion;plantarflexion;supine;10 repetitions;2 sets  -DK     Row Name 09/02/21 0930          Plan of Care Review    Plan of Care Reviewed With  patient  -DK     Progress  no change  -     Row Name 09/02/21 0930          Positioning and Restraints    Pre-Treatment Position  in bed  -DK     Post Treatment Position  bed  -DK     In Bed  supine;call light within reach;encouraged to call for assist;exit alarm on;side rails up x3;legs elevated;heels elevated  -     Row Name 09/02/21 0930          Therapy Assessment/Plan (PT)    Rehab Potential (PT)  fair, will monitor progress closely  -DK     Criteria for Skilled Interventions Met (PT)  skilled treatment is necessary  -DK     Problem List (PT)  problems related to;balance;cognition;mobility;strength;hearing;communication;inability to direct their own care  -DK     Activity Limitations Related to Problem List  (PT)  unable to ambulate safely;unable to transfer safely  -DK     Row Name 09/02/21 0930          Progress Summary (PT)    Progress Toward Functional Goals (PT)  progress toward functional goals is fair  -DK       User Key  (r) = Recorded By, (t) = Taken By, (c) = Cosigned By    Initials Name Provider Type    Katelin Trejo PTA Physical Therapy Assistant          PT Recommendation and Plan  Planned Therapy Interventions (PT): balance training, bed mobility training, strengthening, transfer training  Therapy Frequency (PT): daily  Progress Summary (PT)  Progress Toward Functional Goals (PT): progress toward functional goals is fair  Plan of Care Reviewed With: patient  Progress: no change  Outcome Measures     Row Name 09/02/21 0929 09/01/21 1600          How much help from another person do you currently need...    Turning from your back to your side while in flat bed without using bedrails?  2  -DK  3  -CS     Moving from lying on back to sitting on the side of a flat bed without bedrails?  2  -DK  3  -CS     Moving to and from a bed to a chair (including a wheelchair)?  1  -DK  1  -CS     Standing up from a chair using your arms (e.g., wheelchair, bedside chair)?  2  -DK  2  -CS     Climbing 3-5 steps with a railing?  1  -DK  1  -CS     To walk in hospital room?  1  -DK  1  -CS     AM-PAC 6 Clicks Score (PT)  9  -DK  11  -CS        Functional Assessment    Outcome Measure Options  AM-PAC 6 Clicks Basic Mobility (PT)  -DK  AM-PAC 6 Clicks Basic Mobility (PT)  -CS       User Key  (r) = Recorded By, (t) = Taken By, (c) = Cosigned By    Initials Name Provider Type    Katelin Trejo PTA Physical Therapy Assistant    Xiao Major, PT Physical Therapist           Time Calculation:   PT Charges     Row Name 09/02/21 0934             Time Calculation    PT Received On  09/02/21  -ARMAND      PT Goal Re-Cert Due Date  09/10/21  -DK         Timed Charges    16227 - PT Therapeutic Exercise Minutes  14  -DK       38722 - PT Therapeutic Activity Minutes  10  -DK         Total Minutes    Timed Charges Total Minutes  24  -DK       Total Minutes  24  -DK        User Key  (r) = Recorded By, (t) = Taken By, (c) = Cosigned By    Initials Name Provider Type    Katelin Trejo PTA Physical Therapy Assistant        Therapy Charges for Today     Code Description Service Date Service Provider Modifiers Qty    65899577292 HC PT THER PROC EA 15 MIN 9/2/2021 Katelin Argueta PTA GP 1    07679573450 HC PT THERAPEUTIC ACT EA 15 MIN 9/2/2021 Katelin Argueta PTA GP 1          PT G-Codes  Outcome Measure Options: AM-PAC 6 Clicks Basic Mobility (PT)  AM-PAC 6 Clicks Score (PT): 9  AM-PAC 6 Clicks Score (OT): 10    Katelin Argueta PTA  9/2/2021

## 2021-09-02 NOTE — PROGRESS NOTES
[facility]   Hospitalist Progress Note  Date: 2021  Patient Name: Noelle Nazario  : 1940  MRN: [id number]  Date of admission: 2021      Subjective   Subjective     Chief Complaint: SOA, fever    Summary: 81 yo female with recurrent pneumonias, recently discharged from [facility], who had apparently been progressively worsening over the last 3 days prior to admission.  Febrile at home, with a recent chest x-ray showing some possible worsening of lower lobe infiltrate.  She had a telehealth visit with her PCP and was advised to come to the ED.  She was admitted to the hospital service and started on antibiotics.    Interval Followup: Denies complaints today, eager to go home.  No chest pain or shortness of breath.    Review of Systems   Cannot reliably get review of systems due to dementia but denies complaints    Objective   Objective     Vitals:   Temp:  [97.3 °F (36.3 °C)-99.3 °F (37.4 °C)] 98.6 °F (37 °C)  Heart Rate:  [] 109  Resp:  [18] 18  BP: (127-157)/(71-87) 157/87  Physical Exam    Constitutional: Awake, alert, no acute distress, nontoxic appearing   Eyes: Pupils equal, sclerae anicteric   HENT: NCAT, mucous membranes moist   Neck: Supple, trachea midline   Respiratory: dim bs    Cardiovascular: RRR, no jvd   Gastrointestinal: Positive bowel sounds, soft, nontender, nondistended   Musculoskeletal: No bilateral ankle edema, no clubbing or cyanosis to extremities   Psychiatric: Appropriate affect, cooperative   Neurologic: speech clear   Skin: No rashes     Result Review    Result Review:  I have personally reviewed the results from the time of this admission to 2021 19:54 EDT and agree with these findings:  [x]  Laboratory  []  Microbiology  []  Radiology  []  EKG/Telemetry   []  Cardiology/Vascular   []  Pathology  []  Old records  []  Other:    Assessment/Plan   Assessment / Plan     Assessment:  • Possible HCAP  • COVID ruled out   • Recent UTI  • Chronic  leukocytosis  • H/o Hodgkins lymphoma  • HTN  • Dementia    Plan:  · Continue abx for now  · Ruled out for COVID, can de-isolate  · Palliative care consulted, family wished to make patient a DNR, with no defibrillation, intubation, or PEG tube placement as well  · DC vancomycin given negative PCR  · Anticipate patient can discharge home tomorrow        Discussed plan with RN.    DVT prophylaxis:  Mechanical DVT prophylaxis orders are present.    CODE STATUS:   Limited Support to NOT Include: Intubation; Cardioversion/Defibrillation; Other  Code Status: No CPR  Medical Interventions (Level of Support Prior to Arrest): Limited  Limited Support to Also NOT Include: PEG tube    Electronically signed by Raul Loco MD, 09/02/21, 7:56 PM EDT.

## 2021-09-02 NOTE — PLAN OF CARE
Palliative care visited with patient and her son Kd,  I introduced palliative care and explained my role. Patient is pleasantly confused, she is talking about unrelated topics while I was present.  She has no signs of discomfort noted. Kd reports that she has continued to have a steady decline since her last admission.  She is incontinent, she needs assistance with ambulating and all her adls.  He provides her care at home with the assistance of a hired caregiver and home health.       Kd called his sister and put her on speaker phone so she could be present for the meeting.  We talked about their goals and wishes for the patient.  I explained her disease (dementia), what to expect and asked about their wishes for her treatment.  Kd informed me that they have been through this with their father and are aware of the expected decline.      They both agreed they do not want cpr, (defibrillation) or a feeding tube.  We discussed that her disease is progressive and their treatment options as she declines.  We discussed home health, palliative care and hospice services at home. They were not ready for palliative or hospice and want her to return home with home health.  They are aware they can have her evaluated at home if she continues to have a decline.      Both children voiced that she is not sleeping well and asked for a sleeping aid.  She currently takes melatonin at home and that has been effective in the past but it has not helped during this stay. Dr. Loco was notified and will order something for her.     I assisted with an EMS DNR and a copy was placed in the chart and Kd was given the original.      Emotional support was provided.  Palliative care will continue to provide support and assistance.   Janna Gomez, ELN, RN, CHPN

## 2021-09-02 NOTE — THERAPY TREATMENT NOTE
Acute Care - Speech Language Pathology   Swallow Treatment Note BELKYS Holden     Patient Name: Noelle Nazario  : 1940  MRN: [id number]  Today's Date: 2021               Admit Date: 2021    Visit Dx:     ICD-10-CM ICD-9-CM   1. HCAP (healthcare-associated pneumonia)  J18.9 486   2. Decreased activities of daily living (ADL)  Z78.9 V49.89   3. Oropharyngeal dysphagia  R13.12 787.22   4. Difficulty walking  R26.2 719.7     Patient Active Problem List   Diagnosis   • Pancytopenia (CMS/HCC)   • Arthritis   • Breast mass, right   • Cataract   • Dementia (CMS/HCC)   • Depressive disorder, not elsewhere classified   • Esophageal reflux   • Hyperlipemia   • Hypokalemia   • Hyponatremia   • Hodgkin's disease in adult (CMS/HCC)   • HTN (hypertension)   • Long term (current) use of anticoagulants   • Pulmonary embolism (CMS/HCC)   • Stroke (CMS/HCC)   • Balance disorder   • Sepsis (CMS/HCC)   • Pneumonia   • Lactic acidosis   • Severe malnutrition (CMS/HCC)   • Recurrent UTI   • HCAP (healthcare-associated pneumonia)     Past Medical History:   Diagnosis Date   • Allergies    • Arthritis    • Blood disease    • Broken bones    • Cancer (CMS/HCC)    • Cataract    • Chest pain     stress by Dr. Medrano in 2015 was normal, beacause of her risk fasctors he recommended proceeding with cath, however she is reluctant to do this at this time, if chest pain worsents she will consider cath in the future   • Dementia (CMS/HCC)    • Disease of thyroid gland    • Emphysema, unspecified (CMS/HCC)    • Female bladder prolapse    • GERD (gastroesophageal reflux disease)    • History of transfusion    • Hodgkin's lymphoma (CMS/HCC)    • HTN (hypertension)    • Hyperlipemia    • Long term (current) use of anticoagulants    • Night sweats    • Pulmonary embolism (CMS/HCC)    • Stroke (CMS/HCC)      Past Surgical History:   Procedure Laterality Date   • BREAST BIOPSY     • COLONOSCOPY     • EYE SURGERY     • HYSTERECTOMY          SLP Recommendation and Plan                                                             Plan of Care Reviewed With: patient         SWALLOW EVALUATION (last 72 hours)      SLP Adult Swallow Evaluation     Row Name 09/01/21 1124                   Rehab Evaluation    Document Type  evaluation  -SN        Subjective Information  no complaints  -SN        Patient Observations  alert;cooperative  -SN           Recommendations    Therapy Frequency (Swallow)  daily;5 days per week  -SN        Predicted Duration Therapy Intervention (Days)  until discharge  -SN        SLP Diet Recommendation  mechanical soft with no mixed consistencies;thin liquids  -SN        Recommended Precautions and Strategies  upright posture during/after eating;small bites of food and sips of liquid  -SN        SLP Rec. for Method of Medication Administration  meds whole;as tolerated  -SN        Monitor for Signs of Aspiration  yes  -SN          User Key  (r) = Recorded By, (t) = Taken By, (c) = Cosigned By    Initials Name Effective Dates    Luli Aguirre, BRADLEY 03/31/21 -         SPEECH PATHOLOGY DYSPHAGIA TREATMENT    Subjective/Behavioral Observations: Patient is alert, intermittently confused.  Required frequent redirection to task and p.o. intake.        Day/time of Treatment: 9/2/2021        Current Diet: Soft solids, thin liquids        Current Strategies: Small bites and sips, controlled drink, cues to clear oral residue        Treatment received: Treatment received in conjunction with a.m. meal/soft solids and thin liquids.        Results of treatment: Patient required significant motivation for participation.  Patient intermittently confused and talking about people who were not in the room.  Patient only agreeable to taking sips of thin liquids via straw only.  Single sips of boost taken with patient tolerating approximately 60 mL with encouragement.  Soft solids with moderate left-sided oral residue/pocketing.  Patient talking  with oral residue in mouth requiring max cues for clearance.        Progress toward goals: Patient required increased cueing level for participation in use of strategies.        Barriers to Achieving goals: Age-related/medical status        Plan of care:/changes in plan: Continue current plan                EDUCATION  The patient has been educated in the following areas:   Dysphagia (Swallowing Impairment).              Time Calculation:   Time Calculation- SLP     Row Name 09/02/21 1133             Time Calculation- SLP    SLP Start Time  1000  -SN      SLP Received On  09/02/21  -SN         Untimed Charges    13770-YG Treatment Swallow Minutes  45  -SN         Total Minutes    Untimed Charges Total Minutes  45  -SN       Total Minutes  45  -SN        User Key  (r) = Recorded By, (t) = Taken By, (c) = Cosigned By    Initials Name Provider Type    Luli Aguirre SLP Speech and Language Pathologist          Therapy Charges for Today     Code Description Service Date Service Provider Modifiers Qty    23162137813 HC ST EVAL ORAL PHARYNG SWALLOW 4 9/1/2021 Luli Richards SLP GN 1    42438686467 HC ST TREATMENT SWALLOW 3 9/2/2021 Luli Richards SLP GN 1               BRADLEY Vallejo  9/2/2021

## 2021-09-02 NOTE — PROGRESS NOTES
"Pharmacy to Dose Vancomycin Day: 3   80 y.o.female      Consulting Provider: Darian  Clinical Indication: PNA  Pertinent Past Medical History: hyperlipidemia,hypothyroidism,GERD,HTN, PE,Hodgkin's lymphoma  Goal -600 mg/L.hr  Duration of therapy: 7    157.5 cm (62\")       09/02/21  0509      Weight: 62.3 kg (137 lb 5.6 oz)         Estimated Creatinine Clearance: 47.9 mL/min (A) (by C-G formula based on SCr of 0.45 mg/dL (L)).  Results from last 7 days   Lab Units 09/02/21  0534 09/01/21  0548 08/31/21  1239   BUN mg/dL 8 15 19   CREATININE mg/dL 0.45* 0.52* 0.57       HD/PD/CRRT?: no    Lab Results   Component Value Date    WBC 10.51 09/02/2021      Temperature    09/01/21 1932 09/01/21 2219 09/02/21 0224   Temp: 98.3 °F (36.8 °C) 97.3 °F (36.3 °C) 97.9 °F (36.6 °C)      I/O last 3 completed shifts:  In: 840 [P.O.:240; IV Piggyback:600]  Out: 1300 [Urine:1300]   Contrast Administered: no    Relevant Micro:   Microbiology Results (last 10 days)       Procedure Component Value - Date/Time    MRSA Screen, PCR (Inpatient) - Swab, Nares [[id number]]  (Normal) Collected: 09/01/21 0308    Lab Status: Final result Specimen: Swab from Nares Updated: 09/01/21 0438     MRSA PCR No MRSA Detected    S. Pneumo Ag Urine or CSF - Urine, Urine, Clean Catch [[id number]]  (Normal) Collected: 09/01/21 0118    Lab Status: Final result Specimen: Urine, Clean Catch Updated: 09/01/21 0604     Strep Pneumo Ag Negative    Legionella Antigen, Urine - Urine, Urine, Clean Catch [[id number]]  (Normal) Collected: 09/01/21 0118    Lab Status: Final result Specimen: Urine, Clean Catch Updated: 09/01/21 0604     LEGIONELLA ANTIGEN, URINE Negative    Mycoplasma Pneumoniae Antibody, IgM - Blood, Arm, Left [[id number]]  (Normal) Collected: 08/31/21 2131    Lab Status: Final result Specimen: Blood from Arm, Left Updated: 08/31/21 2250     Mycoplasma pneumo IgM Negative    Influenza Antigen, Rapid - Swab, Nasopharynx [[id number]]  (Normal) Collected: " 08/31/21 1920    Lab Status: Final result Specimen: Swab from Nasopharynx Updated: 08/31/21 2006     Influenza A Ag, EIA Negative     Influenza B Ag, EIA Negative    Blood Culture - Blood, Arm, Left [[id number]] Collected: 08/31/21 1859    Lab Status: Preliminary result Specimen: Blood from Arm, Left Updated: 09/01/21 1915     Blood Culture No growth at 24 hours    Blood Culture - Blood, Arm, Left [[id number]] Collected: 08/31/21 1859    Lab Status: Preliminary result Specimen: Blood from Arm, Left Updated: 09/01/21 1915     Blood Culture No growth at 24 hours    COVID-19,CEPHEID/OPHELIA/BDMAX,COR/BRIANNE/PAD/GERMAIN IN-HOUSE(OR EMERGENT/ADD-ON),NP SWAB IN TRANSPORT MEDIA 3-4 HR TAT, RT-PCR - Swab, Nasopharynx [[id number]]  (Normal) Collected: 08/31/21 1743    Lab Status: Final result Specimen: Swab from Nasopharynx Updated: 09/01/21 1000     COVID19 Not Detected    Narrative:      Fact sheet for providers: https://www.fda.gov/media/194778/download     Fact sheet for patients: https://www.fda.gov/media/489219/download  Fact sheet for providers: https://www.fda.gov/media/357257/download     Fact sheet for patients: https://www.fda.gov/media/302570/download    Urine Culture - Urine, Urine, Clean Catch [[id number]] Collected: 08/27/21 1045    Lab Status: Final result Specimen: Urine, Clean Catch Updated: 08/28/21 1623     Urine Culture <25,000 CFU/mL Mixed Lisa Isolated    Narrative:      Specimen contains mixed organisms of questionable pathogenicity which indicates contamination with commensal lisa.  Further identification is unlikely to provide clinically useful information.  Suggest recollection.                   CT Chest Without Contrast Diagnostic    Result Date: 8/31/2021  PROCEDURE: CT CHEST WO CONTRAST DIAGNOSTIC  COMPARISONS: [facility], CR, XR CHEST PA AND LATERAL, 8/27/2021, 11:00.   [facility], CR, XR CHEST 1 VW, 8/31/2021, 13:29.   [facility], CT, CT ABDOMEN  PELVIS W CONTRAST, 7/21/2021, 18:04.   [facility], CT, CT ABDOMEN PELVIS W CONTRAST, 6/07/2021, 10:30.   [facility], CT, CT CHEST WO CONTRAST DIAGNOSTIC, 8/16/2021, 15:40.  INDICATIONS: Pneumonia, effusion or abscess suspected, x-ray done.  TECHNIQUE: 524 CT images were created without the administration of contrast material.   PROTOCOL:   Standard imaging protocol performed    RADIATION:   DLP: 187.4mGy*cm   Automated exposure control was utilized to minimize radiation dose.  FINDINGS: No significant interval change is seen in the nodular opacities in the bilateral lung apices, probably greater on the right, since 8/16/2021.  There may be peribronchial thickening, probably greater on the right.  Atelectasis and/or fibrosis is (are) suggested in the lung bases.  Acute infiltrate is thought to be less likely.  No pneumothorax or pneumomediastinum.  Atherosclerotic changes are noted, including involvement of the thoracic aorta and the great arteries.  Minimal, if any, coronary artery calcification is seen.  The technique was not tailored to evaluate the coronary arteries.  A trace amount of pericardial effusion is possible.  There may be borderline cardiac enlargement.  There may be minimal if any pleural effusion.  A tiny hiatal hernia is possible.  No acute findings are seen in the partially imaged upper abdomen.  Residual oral contrast agent may be present in the colon (versus ingested hyperdense medication).  Small to moderate sized mediastinal lymph nodes are seen.  Some of these are calcified.  Similar findings were seen previously.  There are degenerative changes throughout the thoracic spine.  There is an age-indeterminate but probably subacute to chronic superior endplate vertebral compression fracture at approximately T9 with less than 20 percent decrease in its craniocaudal extent, such as seen on image 96 of series 203 and adjacent images, seen previously.  There is  minimal if any posterior wall retropulsion associated with this finding.  No new acute fracture is suspected.  CONCLUSION: No significant interval change is seen by nonenhanced CT examination of the chest since the 8/16/2021 study with findings as detailed above.  No new infiltrates are seen.  No significant pleural effusion.  A small pericardial effusion is identified.    SARINA CASTRO JR, MD       Electronically Signed and Approved By: SARINA CASTRO JR, MD on 8/31/2021 at 21:02                          XR Chest 1 View    Result Date: 8/31/2021  PROCEDURE: XR CHEST 1 VW  COMPARISON: [facility], CT, CT CHEST WO CONTRAST DIAGNOSTIC, 8/16/2021, 15:40.  [facility], CR, XR CHEST PA AND LATERAL, 8/27/2021, 11:00.  INDICATIONS: Weak/Dizzy/AMS triage protocol  FINDINGS:  The heart remains normal in size.  The lungs are well-expanded.  Patchy airspace disease in the right lower lobe is unchanged.  New mild airspace disease is seen in the left lower lobe.  This may represent pneumonia or atelectasis.  CONCLUSION:  Mild bilateral lower lobe infiltrate or atelectasis.       ILA WALLACE MD       Electronically Signed and Approved By: ILA WALLACE MD on 8/31/2021 at 14:06                                          Other Antimicrobial Therapy: cefepime    Assessment/Plan  Vancomycin random level this am--> 16. 52 mcg/ml    Regimen: 750mg iv q12 h  Exposure Target: AUC24 (range) 400-600 mg/L.hr  AUC24,ss: 433 mg/L.hr  PAUC*: 65 %  Ctrough,ss: 13.2 mg/L  Pconc*: 6%  Tox.: 8 %    Continue with same regimen for now  Labs ordered: BMP for am

## 2021-09-03 NOTE — THERAPY TREATMENT NOTE
Acute Care - Physical Therapy Treatment Note   [place]     Patient Name: Noelle Nazario  : 1940  MRN: [id number]  Today's Date: 9/3/2021      Visit Dx:     ICD-10-CM ICD-9-CM   1. HCAP (healthcare-associated pneumonia)  J18.9 486   2. Decreased activities of daily living (ADL)  Z78.9 V49.89   3. Oropharyngeal dysphagia  R13.12 787.22   4. Difficulty walking  R26.2 719.7     Patient Active Problem List   Diagnosis   • Pancytopenia (CMS/HCC)   • Arthritis   • Breast mass, right   • Cataract   • Dementia (CMS/HCC)   • Depressive disorder, not elsewhere classified   • Esophageal reflux   • Hyperlipemia   • Hypokalemia   • Hyponatremia   • Hodgkin's disease in adult (CMS/HCC)   • HTN (hypertension)   • Long term (current) use of anticoagulants   • Pulmonary embolism (CMS/HCC)   • Stroke (CMS/HCC)   • Balance disorder   • Sepsis (CMS/HCC)   • Pneumonia   • Lactic acidosis   • Severe malnutrition (CMS/HCC)   • Recurrent UTI   • HCAP (healthcare-associated pneumonia)     Past Medical History:   Diagnosis Date   • Allergies    • Arthritis    • Blood disease    • Broken bones    • Cancer (CMS/HCC)    • Cataract    • Chest pain     stress by Dr. Medrano in 2015 was normal, beacause of her risk fasctors he recommended proceeding with cath, however she is reluctant to do this at this time, if chest pain worsents she will consider cath in the future   • Dementia (CMS/HCC)    • Disease of thyroid gland    • Emphysema, unspecified (CMS/HCC)    • Female bladder prolapse    • GERD (gastroesophageal reflux disease)    • History of transfusion    • Hodgkin's lymphoma (CMS/HCC)    • HTN (hypertension)    • Hyperlipemia    • Long term (current) use of anticoagulants    • Night sweats    • Pulmonary embolism (CMS/HCC)    • Stroke (CMS/HCC)      Past Surgical History:   Procedure Laterality Date   • BREAST BIOPSY     • COLONOSCOPY     • EYE SURGERY     • HYSTERECTOMY          PT Assessment (last 12 hours)      PT Evaluation  and Treatment     Row Name 09/03/21 0902          Physical Therapy Time and Intention    Subjective Information  no complaints  -DK     Document Type  therapy note (daily note)  -DK     Mode of Treatment  individual therapy;physical therapy  -DK     Patient Effort  good  -DK     Symptoms Noted During/After Treatment  none  -DK     Row Name 09/03/21 0902          Cognition    Affect/Mental Status (Cognitive)  confused;anxious;low arousal/lethargic  -DK     Behavioral Issues (Cognitive)  overwhelmed easily  -DK     Orientation Status (Cognition)  oriented to;person  -DK     Follows Commands (Cognition)  WFL  -DK     Cognitive Function (Cognitive)  WFL  -DK     Row Name 09/03/21 0902          Pain Scale: Word Pre/Post-Treatment    Pain: Word Scale, Pretreatment  0 - no pain  -DK     Posttreatment Pain Rating  0 - no pain  -DK     Row Name 09/03/21 0902          Bed Mobility    Bed Mobility  supine-sit-supine  -DK     Rolling Left [place] (Bed Mobility)  maximum assist (25% patient effort);1 person assist  -DK     Rolling Right [place] (Bed Mobility)  maximum assist (25% patient effort);1 person assist  -DK     Supine-Sit [place] (Bed Mobility)  maximum assist (25% patient effort);1 person assist  -DK     Supine-Sit-Supine [place] (Bed Mobility)  maximum assist (25% patient effort);1 person assist  -DK     Comment (Bed Mobility)  Sat EOB x 3-4 min with HO/min (A) x 1.  -DK     Row Name 09/03/21 0902          Transfers    Transfers  sit-stand transfer;stand-sit transfer  -DK     Row Name 09/03/21 0902          Safety Issues, Functional Mobility    Safety Issues Affecting Function (Mobility)  ability to follow commands;awareness of need for assistance;impulsivity;judgment;safety precaution awareness  -DK     Impairments Affecting Function (Mobility)  balance;cognition;endurance/activity tolerance;strength  -DK     Cognitive Impairments, Mobility Safety/Performance  attention;awareness, need for  assistance;impulsivity;judgment;safety precaution awareness  -     Row Name 09/03/21 0902          Balance    Balance Assessment  sitting static balance  -DK     Static Sitting Balance  WFL  -     Balance Interventions  sitting;static;supported  -     Row Name 09/03/21 0902          Motor Skills    Motor Skills  therapeutic exercise  -DK     Coordination  [facility]  -     Therapeutic Exercise  hip;knee;ankle  -     Row Name 09/03/21 0902          Hip (Therapeutic Exercise)    Hip (Therapeutic Exercise)  AAROM (active assistive range of motion)  -DK     Hip AAROM (Therapeutic Exercise)  bilateral;flexion;extension;aBduction;aDduction;supine;10 repetitions;2 sets  -     Row Name 09/03/21 0902          Knee (Therapeutic Exercise)    Knee (Therapeutic Exercise)  AAROM (active assistive range of motion)  -DK     Knee AAROM (Therapeutic Exercise)  bilateral;flexion;extension;supine;10 repetitions;2 sets  -     Row Name 09/03/21 0902          Ankle (Therapeutic Exercise)    Ankle (Therapeutic Exercise)  AAROM (active assistive range of motion)  -DK     Ankle AAROM (Therapeutic Exercise)  bilateral;dorsiflexion;plantarflexion;supine;10 repetitions;2 sets  -     Row Name 09/03/21 0902          Plan of Care Review    Plan of Care Reviewed With  patient  -DK     Progress  no change  -     Row Name 09/03/21 0902          Positioning and Restraints    Pre-Treatment Position  in bed  -DK     Post Treatment Position  bed  -DK     In Bed  supine;call light within reach;encouraged to call for assist;exit alarm on;side rails up x3;legs elevated;heels elevated  -     Row Name 09/03/21 0902          Therapy Assessment/Plan (PT)    Rehab Potential (PT)  fair, will monitor progress closely  -     Criteria for Skilled Interventions Met (PT)  skilled treatment is necessary  -DK     Problem List (PT)  problems related to;balance;cognition;mobility;strength;hearing;communication;inability to direct their own care  -DK      Activity Limitations Related to Problem List (PT)  unable to ambulate safely;unable to transfer safely  -DK     Row Name 09/03/21 0902          Progress Summary (PT)    Progress Toward Functional Goals (PT)  progress toward functional goals is fair  -DK       User Key  (r) = Recorded By, (t) = Taken By, (c) = Cosigned By    Initials Name Provider Type    Katelin Trejo PTA Physical Therapy Assistant          PT Recommendation and Plan  Planned Therapy Interventions (PT): balance training, bed mobility training, gait training, strengthening, transfer training  Therapy Frequency (PT): daily  Progress Summary (PT)  Progress Toward Functional Goals (PT): progress toward functional goals is fair  Plan of Care Reviewed With: patient  Progress: no change  Outcome Measures     Row Name 09/03/21 0902 09/02/21 0929 09/01/21 1600       How much help from another person do you currently need...    Turning from your back to your side while in flat bed without using bedrails?  2  -DK  2  -DK  3  -CS    Moving from lying on back to sitting on the side of a flat bed without bedrails?  2  -DK  2  -DK  3  -CS    Moving to and from a bed to a chair (including a wheelchair)?  1  -DK  1  -DK  1  -CS    Standing up from a chair using your arms (e.g., wheelchair, bedside chair)?  2  -DK  2  -DK  2  -CS    Climbing 3-5 steps with a railing?  1  -DK  1  -DK  1  -CS    To walk in hospital room?  1  -DK  1  -DK  1  -CS    AM-PAC 6 Clicks Score (PT)  9  -DK  9  -DK  11  -CS       Functional Assessment    Outcome Measure Options  AM-PAC 6 Clicks Basic Mobility (PT)  -DK  AM-PAC 6 Clicks Basic Mobility (PT)  -DK  AM-PAC 6 Clicks Basic Mobility (PT)  -CS      User Key  (r) = Recorded By, (t) = Taken By, (c) = Cosigned By    Initials Name Provider Type    Katelin Trejo PTA Physical Therapy Assistant    Xiao Major, PT Physical Therapist           Time Calculation:   PT Charges     Row Name 09/03/21 0904             Time Calculation     PT Received On  09/03/21  -DK      PT Goal Re-Cert Due Date  09/10/21  -DK         Timed Charges    80297 - PT Therapeutic Exercise Minutes  14  -DK      34883 - PT Therapeutic Activity Minutes  10  -DK         Total Minutes    Timed Charges Total Minutes  24  -DK       Total Minutes  24  -DK        User Key  (r) = Recorded By, (t) = Taken By, (c) = Cosigned By    Initials Name Provider Type    Katelin Trejo PTA Physical Therapy Assistant        Therapy Charges for Today     Code Description Service Date Service Provider Modifiers Qty    10598443497 HC PT THER PROC EA 15 MIN 9/2/2021 Katelin Argueta, PTA GP 1    03727764457 HC PT THERAPEUTIC ACT EA 15 MIN 9/2/2021 Katelin Argueta, PTA GP 1    36607479769 HC PT THER PROC EA 15 MIN 9/3/2021 Katelin Argueta, PTA GP 1    37387963622 HC PT THERAPEUTIC ACT EA 15 MIN 9/3/2021 Katelin Argueta, PTA GP 1          PT G-Codes  Outcome Measure Options: AM-PAC 6 Clicks Basic Mobility (PT)  AM-PAC 6 Clicks Score (PT): 9  AM-PAC 6 Clicks Score (OT): 10    Katelin Argueta PTA  9/3/2021

## 2021-09-03 NOTE — PLAN OF CARE
Goal Outcome Evaluation:      Patient pleasantly confused at this time. Will discharge home with [facility].

## 2021-09-03 NOTE — PLAN OF CARE
Goal Outcome Evaluation:  Plan of Care Reviewed With: patient        Progress: improving  Outcome Summary: pt anticipating d/c today

## 2021-09-03 NOTE — THERAPY TREATMENT NOTE
Acute Care - Speech Language Pathology   Swallow Treatment Note BELKYS [facility]     Patient Name: Noelle Nazario  : 1940  MRN: [id number]  Today's Date: 9/3/2021               Admit Date: 2021    Visit Dx:     ICD-10-CM ICD-9-CM   1. HCAP (healthcare-associated pneumonia)  J18.9 486   2. Decreased activities of daily living (ADL)  Z78.9 V49.89   3. Oropharyngeal dysphagia  R13.12 787.22   4. Difficulty walking  R26.2 719.7     Patient Active Problem List   Diagnosis   • Pancytopenia (CMS/HCC)   • Arthritis   • Breast mass, right   • Cataract   • Dementia (CMS/HCC)   • Depressive disorder, not elsewhere classified   • Esophageal reflux   • Hyperlipemia   • Hypokalemia   • Hyponatremia   • Hodgkin's disease in adult (CMS/HCC)   • HTN (hypertension)   • Long term (current) use of anticoagulants   • Pulmonary embolism (CMS/HCC)   • Stroke (CMS/HCC)   • Balance disorder   • Sepsis (CMS/HCC)   • Pneumonia   • Lactic acidosis   • Severe malnutrition (CMS/HCC)   • Recurrent UTI   • HCAP (healthcare-associated pneumonia)     Past Medical History:   Diagnosis Date   • Allergies    • Arthritis    • Blood disease    • Broken bones    • Cancer (CMS/HCC)    • Cataract    • Chest pain     stress by Dr. Medrano in 2015 was normal, beacause of her risk fasctors he recommended proceeding with cath, however she is reluctant to do this at this time, if chest pain worsents she will consider cath in the future   • Dementia (CMS/HCC)    • Disease of thyroid gland    • Emphysema, unspecified (CMS/HCC)    • Female bladder prolapse    • GERD (gastroesophageal reflux disease)    • History of transfusion    • Hodgkin's lymphoma (CMS/HCC)    • HTN (hypertension)    • Hyperlipemia    • Long term (current) use of anticoagulants    • Night sweats    • Pulmonary embolism (CMS/HCC)    • Stroke (CMS/HCC)      Past Surgical History:   Procedure Laterality Date   • BREAST BIOPSY     • COLONOSCOPY     • EYE SURGERY     • HYSTERECTOMY          SLP Recommendation and Plan                                                             Plan of Care Reviewed With: patient         SWALLOW EVALUATION (last 72 hours)      SLP Adult Swallow Evaluation     Row Name 09/01/21 1124                   Rehab Evaluation    Document Type  evaluation  -SN        Subjective Information  no complaints  -SN        Patient Observations  alert;cooperative  -SN           Recommendations    Therapy Frequency (Swallow)  daily;5 days per week  -SN        Predicted Duration Therapy Intervention (Days)  until discharge  -SN        SLP Diet Recommendation  mechanical soft with no mixed consistencies;thin liquids  -SN        Recommended Precautions and Strategies  upright posture during/after eating;small bites of food and sips of liquid  -SN        SLP Rec. for Method of Medication Administration  meds whole;as tolerated  -SN        Monitor for Signs of Aspiration  yes  -SN          User Key  (r) = Recorded By, (t) = Taken By, (c) = Cosigned By    Initials Name Effective Dates    Luli Aguirre, SLP 03/31/21 -        SPEECH PATHOLOGY DYSPHAGIA TREATMENT    Subjective/Behavioral Observations: Patient is awake and cooperative.  Possible discharge home with home health today.        Day/time of Treatment: 9/3/2021        Current Diet: Soft solids, thin liquids        Current Strategies: Assist with self-feeding, small bites and sips, cue to clear oral residue/pocketing        Treatment received: Treatment received this a.m. in conjunction with breakfast meal.       Results of treatment: Patient required encouragement and motivation for active participation.  Patient initially refusing any p.o. intake.  Assisted patient with self feeding.  Frequent oral residue noted in left buccal cavity however clears with moderate cues and liquid wash assist.  Patient taking bites of Swedish toast and ate approximately 1/2 cup of yogurt with assist for self-feeding.  Tolerated 120 mL of orange  juice via straw doubt overt signs or symptoms of aspiration.          Progress toward goals: Pending home discharge this date.  Patient is tolerating current diet recommendations of soft solids and thin liquids however requires moderate cues for use of strategies as well as one-on-one assist for self-feeding.        Barriers to Achieving goals: Motivation, age-related        Plan of care:/changes in plan: Upon discharge, recommend continuation of current diet, positioning and compensatory strategies.                  EDUCATION  The patient has been educated in the following areas:   Dysphagia (Swallowing Impairment).              Time Calculation:   Time Calculation- SLP     Row Name 09/03/21 1033             Time Calculation- SLP    SLP Start Time  0900  -SN      SLP Received On  09/03/21  -SN         Untimed Charges    52966-QR Treatment Swallow Minutes  45  -SN         Total Minutes    Untimed Charges Total Minutes  45  -SN       Total Minutes  45  -SN        User Key  (r) = Recorded By, (t) = Taken By, (c) = Cosigned By    Initials Name Provider Type    Luli Aguirre SLP Speech and Language Pathologist          Therapy Charges for Today     Code Description Service Date Service Provider Modifiers Qty    33598289197 HC ST TREATMENT SWALLOW 3 9/2/2021 Luli Richards SLP GN 1    65726992185 HC ST TREATMENT SWALLOW 3 9/3/2021 Luli Richards SLP GN 1               BRADLEY Vallejo  9/3/2021

## 2021-09-03 NOTE — DISCHARGE SUMMARY
[facility]         HOSPITALIST  DISCHARGE SUMMARY    Patient Name: Noelle Nazario  : 1940  MRN: [id number]    Date of Admission: 2021  Date of Discharge:  9/3/21  Primary Care Physician: Marquita Cook MD   Admitting Service: Hospital Medicine  Consultants: Palliative Care    Final Diagnoses:  · Possible pneumonia  · Recent UTI  · Dementia  · COVID ruled out      Hospital Course     Hospital Course:  79 yo female with recurrent pneumonias, recently discharged from [facility], who had apparently been progressively worsening over the last 3 days prior to admission.  She was febrile at home, with a recent chest x-ray showing some possible worsening of lower lobe infiltrate.  She had a telehealth visit with her PCP and was advised to come to the ED.  She was admitted to the hospital service and started on antibiotics.  She has done quite well here, has been afebrile and without complaints.  WBC, procalcitonin normal, blood cultures negative, flu and covid negative.  Given propensity for pneumonia, UTI and recent fever, will complete 5 more days of cefdinir.  MRSA PCR was negative so MRSA coverage not indicated.          DISCHARGE Follow Up Recommendations for labs and diagnostics: pcp f/u      Day of Discharge     Vital Signs:  Temp:  [98.1 °F (36.7 °C)-99.3 °F (37.4 °C)] 98.7 °F (37.1 °C)  Heart Rate:  [] 105  Resp:  [16-18] 16  BP: (139-165)/() 139/78  Physical Exam: NAD, s1s2, chest clear, pleasantly confused      Discharge Details        Discharge Medications      New Medications      Instructions Start Date   cefdinir 300 MG capsule  Commonly known as: OMNICEF   300 mg, Oral, 2 Times Daily         Changes to Medications      Instructions Start Date   clopidogrel 75 MG tablet  Commonly known as: PLAVIX  What changed: See the new instructions.   TAKE 1 TABLET(75 MG) BY MOUTH EVERY DAY      mirtazapine 30 MG tablet  Commonly known as: REMERON  What changed: when to  take this   TAKE 1 TABLET BY MOUTH DAILY         Continue These Medications      Instructions Start Date   acetaminophen 650 MG 8 hr tablet  Commonly known as: TYLENOL   650 mg, Oral, Every 8 Hours PRN      calcium carbonate 600 MG tablet  Commonly known as: OS-LACEY   1,200 mg, Oral, Daily      cholecalciferol 10 MCG (400 UNIT) tablet  Commonly known as: VITAMIN D3   800 Units, Oral, Daily      CREAM BASE EX   Apply externally, Compound cream for pain and inflamation       gabapentin 300 MG capsule  Commonly known as: NEURONTIN   300 mg, Oral, 2 times daily      levothyroxine 25 MCG tablet  Commonly known as: SYNTHROID, LEVOTHROID   25 mcg, Oral, Every Evening      loperamide 2 MG capsule  Commonly known as: IMODIUM   2 mg, Oral, 4 Times Daily PRN      Melatonin 10 MG tablet   10 mg, Oral, Nightly      omeprazole 20 MG capsule  Commonly known as: priLOSEC   20 mg, Oral, Daily      Risaquad-2 capsule capsule   1 capsule, Oral, Daily      sertraline 50 MG tablet  Commonly known as: ZOLOFT   50 mg, Oral, Every Evening      simethicone 125 MG chewable tablet  Commonly known as: MYLICON   125 mg, Oral, Every Morning      vitamin B-12 1000 MCG tablet  Commonly known as: CYANOCOBALAMIN   1,000 mcg, Oral, Daily         Stop These Medications    nitrofurantoin (macrocrystal-monohydrate) 100 MG capsule  Commonly known as: Macrobid            No Known Allergies    Discharge Disposition:  Home-Health Care [facility]    Diet:  Hospital:  Diet Order   Procedures   • Diet Soft Texture; Whole Foods; Thin       Discharge Activity:       CODE STATUS:  Code Status and Medical Interventions:   Ordered at: 09/02/21 1518     Limited Support to NOT Include:    Intubation    Cardioversion/Defibrillation    Other     Code Status:    No CPR     Medical Interventions (Level of Support Prior to Arrest):    Limited     Limited Support to Also NOT Include:    PEG tube         Future Appointments   Date Time Provider Department Center   10/15/2021  3:00  PM Joyce, Marquita Sahu MD [facility] [place]           Pertinent  and/or Most Recent Results     PROCEDURES:   None    LAB RESULTS:      Lab 09/03/21  0549 09/02/21  0534 09/01/21  0548 08/31/21  1927 08/31/21  1239   WBC 10.28 10.51 8.81  --  12.57*   HEMOGLOBIN 9.8* 9.6* 9.8*  --  11.0*   HEMATOCRIT 30.6* 30.5* 31.8*  --  35.7   PLATELETS 281 287 273  --  356   NEUTROS ABS 7.01* 6.86 5.69  --  7.60*   IMMATURE GRANS (ABS) 0.03 0.05 0.03  --  0.04   LYMPHS ABS 1.79 2.01 1.88  --  3.45*   MONOS ABS 1.00* 0.94* 0.67  --  0.98*   EOS ABS 0.39 0.60* 0.50*  --  0.42*   MCV 92.2 93.0 95.5  --  97.0   PROCALCITONIN  --   --  0.05  --   --    LACTATE  --   --   --  0.9  --          Lab 09/03/21  0549 09/02/21  0534 09/01/21  0548 08/31/21  1239   SODIUM 135* 134* 139 139   POTASSIUM 3.3* 3.4* 3.9 3.8   CHLORIDE 105 104 107 104   CO2 21.1* 21.4* 21.7* 23.8   ANION GAP 8.9 8.6 10.3 11.2   BUN 8 8 15 19   CREATININE 0.49* 0.45* 0.52* 0.57   GLUCOSE 133* 135* 104* 113*   CALCIUM 9.3 9.2 9.3 10.0   MAGNESIUM 1.8  --  1.7 1.9   PHOSPHORUS 3.2 3.1 3.8  --          Lab 09/03/21  0549 09/02/21  0534 09/01/21  0548 08/31/21  1239   TOTAL PROTEIN  --   --  6.7 7.8   ALBUMIN 2.90* 3.20* 3.00* 3.50   GLOBULIN  --   --  3.7 4.3   ALT (SGPT)  --   --  9 10   AST (SGOT)  --   --  15 14   BILIRUBIN  --   --  0.3 0.3   ALK PHOS  --   --  66 74         Lab 08/31/21  1239   TROPONIN T <0.010                 Brief Urine Lab Results  (Last result in the past 365 days)      Color   Clarity   Blood   Leuk Est   Nitrite   Protein   CREAT   Urine HCG        08/31/21 1735 Dark Yellow Cloudy Negative Moderate (2+) Negative Trace             Microbiology Results (last 10 days)     Procedure Component Value - Date/Time    MRSA Screen, PCR (Inpatient) - Swab, Nares [[id number]]  (Normal) Collected: 09/01/21 0308    Lab Status: Final result Specimen: Swab from Nares Updated: 09/01/21 0438     MRSA PCR No MRSA Detected    S. Pneumo Ag Urine or CSF - Urine,  Urine, Clean Catch [[id number]]  (Normal) Collected: 09/01/21 0118    Lab Status: Final result Specimen: Urine, Clean Catch Updated: 09/01/21 0604     Strep Pneumo Ag Negative    Legionella Antigen, Urine - Urine, Urine, Clean Catch [[id number]]  (Normal) Collected: 09/01/21 0118    Lab Status: Final result Specimen: Urine, Clean Catch Updated: 09/01/21 0604     LEGIONELLA ANTIGEN, URINE Negative    Mycoplasma Pneumoniae Antibody, IgM - Blood, Arm, Left [[id number]]  (Normal) Collected: 08/31/21 2131    Lab Status: Final result Specimen: Blood from Arm, Left Updated: 08/31/21 2250     Mycoplasma pneumo IgM Negative    Influenza Antigen, Rapid - Swab, Nasopharynx [[id number]]  (Normal) Collected: 08/31/21 1920    Lab Status: Final result Specimen: Swab from Nasopharynx Updated: 08/31/21 2006     Influenza A Ag, EIA Negative     Influenza B Ag, EIA Negative    Blood Culture - Blood, Arm, Left [[id number]] Collected: 08/31/21 1859    Lab Status: Preliminary result Specimen: Blood from Arm, Left Updated: 09/02/21 1915     Blood Culture No growth at 2 days    Blood Culture - Blood, Arm, Left [[id number]] Collected: 08/31/21 1859    Lab Status: Preliminary result Specimen: Blood from Arm, Left Updated: 09/02/21 1915     Blood Culture No growth at 2 days    COVID-19,CEPHEID/OPHELIA/BDMAX,COR/BRIANNE/PAD/[place] IN-HOUSE(OR EMERGENT/ADD-ON),NP SWAB IN TRANSPORT MEDIA 3-4 HR TAT, RT-PCR - Swab, Nasopharynx [[id number]]  (Normal) Collected: 08/31/21 1743    Lab Status: Final result Specimen: Swab from Nasopharynx Updated: 09/01/21 1000     COVID19 Not Detected    Narrative:      Fact sheet for providers: https://www.fda.gov/media/622141/download     Fact sheet for patients: https://www.fda.gov/media/424266/download  Fact sheet for providers: https://www.fda.gov/media/403673/download     Fact sheet for patients: https://www.fda.gov/media/180530/download    Urine Culture - Urine, Urine, Clean Catch [[id number]] Collected: 08/27/21 1045    Lab  Status: Final result Specimen: Urine, Clean Catch Updated: 08/28/21 1623     Urine Culture <25,000 CFU/mL Mixed Lisa Isolated    Narrative:      Specimen contains mixed organisms of questionable pathogenicity which indicates contamination with commensal lisa.  Further identification is unlikely to provide clinically useful information.  Suggest recollection.                       Results for orders placed during the hospital encounter of 07/28/21    Adult Transthoracic Echo Complete W/ Cont if Necessary Per Protocol    Interpretation Summary  · Estimated left ventricular EF = 55% Left ventricular ejection fraction appears to be 51 - 55%. Left ventricular systolic function is normal.  · Left ventricular diastolic function is consistent with (grade I) impaired relaxation.  · Trivial valvular disease.      Labs Pending at Discharge:  Pending Labs     Order Current Status    Blood Culture - Blood, Arm, Left Preliminary result    Blood Culture - Blood, Arm, Left Preliminary result        Condition at dc: Stable for dc    Time spent on Discharge including face to face service:  35 minutes    Electronically signed by Raul Loco MD, 09/03/21, 9:00 AM EDT.

## 2021-09-03 NOTE — SIGNIFICANT NOTE
09/03/21 0923   Plan   Final Discharge Disposition Code 06 - home with home health care   Final Note Pt discharged home with [facility] to resume hhc. Dc medication received at pharmacy. PCP appt made for 9/8. No other needs.

## 2021-09-03 NOTE — OUTREACH NOTE
Prep Survey      Responses   [facility]?  [facility]   Is LACE score < 7 ?  No   Emergency Room discharge w/ pulse ox?  No   Eligibility  [facility]   Date of Admission  08/31/21   Date of Discharge  09/03/21   Discharge Disposition  Home-Health Care [facility]   Discharge diagnosis  Possible pneumonia  Recent UTI  Dementia   Does the patient have one of the following disease processes/diagnoses(primary or secondary)?  COPD/Pneumonia   Does the patient have Home health ordered?  No   Is there a DME ordered?  No   Prep survey completed?  Yes          Shelley Espinosa RN

## 2021-09-06 NOTE — OUTREACH NOTE
"Call Center TCM Note      Responses   [facility] patient discharged from?  [facility]   Does the patient have one of the following disease processes/diagnoses(primary or secondary)?  COPD/Pneumonia   Was the primary reason for admission:  Pneumonia   TCM attempt successful?  Yes   Call start time  0926   Call end time  0957   Discharge diagnosis  Possible pneumonia  Recent UTI  Dementia   Meds reviewed with patient/caregiver?  Yes   Is the patient having any side effects they believe may be caused by any medication additions or changes?  No   Does the patient have all medications ordered at discharge?  Yes   Is the patient taking all medications as directed (includes completed medication regime)?  Yes   Medication comments  Encouraged to complete antibiotics as ordered   Does the patient have a primary care provider?   Yes   Does the patient have an appointment with their PCP or specialist within 7 days of discharge?  Yes   Comments regarding PCP  Hospital D/C follow-up scheduled for 9/8/21@0945   Has the patient kept scheduled appointments due by today?  N/A   What is the [facility] health agency?   [facility]   Has home health visited the patient within 72 hours of discharge?  Yes   Home health comments  HH communicated to family that pt would benefit from a clifton lift   Pulse Ox monitoring  None   Psychosocial issues?  No   Did the patient receive a copy of their discharge instructions?  Yes   Nursing interventions  Reviewed instructions with patient   What is the patient's perception of their health status since discharge?  Same [Dtr communicates that patient was very lethargic next day after discharge  \"not waking up\" but seems to be better today.  Pt is very weak and can't even stand. Family has to physically lift to place in chair, bed.  PT/OT are ordered but not started yet. ]   If the patient is a current smoker, are they able to teach back resources for cessation?  Not a smoker   Is the patient/caregiver able " to teach back the hierarchy of who to call/visit for symptoms/problems? PCP, Specialist, Home health nurse, Urgent Care, ED, 911  Yes [Provided number to call center and also discussed HH 24/7 call line---dtr communicated dissatisfaction with  call line as they had never called her back, enc'd her to discuss with her nurse]   Additional teach back comments  Rev'd s/s of UTI as pt as had previous dx--dtr v/u.     Is the patient/caregiver able to teach back signs and symptoms of worsening condition:  Fever/chills, Shortness of breath, Chest pain [dtr reports pt is afebrile but HH nurse reports crackles in lungs remain.   REv'd concerning s/s and need for MD,  notification.  Dtr also reports pt chokes at times w/drinking--discussed aspiration pneumonia. Patient using IS every hour.]   Is the patient/caregiver able to teach back importance of completing antibiotic course of treatment?  Yes   TCM call completed?  Yes   Wrap up additional comments  Dtr communicated concerns that pt Gabapentin was held during her admission, she understands the rationale but reports that her mother is experiencing the painful effects of not having medication for a few days.  Dtr did not feel it was necessary that it was withheld.          Kasey Campos RN    9/6/2021, 09:57 EDT

## 2021-09-08 NOTE — PROGRESS NOTES
Transitional Care Follow Up Visit  Subjective     Noelle Nazario is a 80 y.o. female who presents for a transitional care management visit.    Within 48 business hours after discharge our office contacted her via telephone to coordinate her care and needs.      I reviewed and discussed the details of that call along with the discharge summary, hospital problems, inpatient lab results, inpatient diagnostic studies, and consultation reports with Noelle.     Current outpatient and discharge medications have been reconciled for the patient.  Reviewed by: Marquita Cook MD      Date of TCM Phone Call 9/3/2021   [facility]   Date of Admission 8/31/2021   Date of Discharge 9/3/2021   Discharge Disposition [place] [facility]

## 2021-09-15 NOTE — PROGRESS NOTES
Chief Complaint  pneumonia    Subjective          Noelle Nazario presents to [facility]  History of Present Illness  Patient understanding that this is a telehealth visit.  I cannot perform a full physical exam, therefore something might be missed, or patient may need to come into the office for further evaluation.  Patient is understanding and consents to this type of visit.  facetime    When she was at home she got a little better and then she was very confused.  They moved the hospital bed to see if that would help   She didn't want to look out the window after they did that    Had bright blood in her stool over the weekend.  It was quite a bit  They feel that it was likely hemorrhoids  The next day it was more of a pink tinged stool    Pain with movement  Would like to try ibuprofen    Not irritated      Objective   Vital Signs:   There were no vitals taken for this visit.    Physical Exam   Result Review :     Gen: well-nourished, no acute distress  HENT: atraumatic, normocephalic  Eyes: extraocular movements intact, no scleral icterus  Lung: breathing comfortably, no cough  Psych: normal mood and affect  Lying in her chair  Not able to communicate with thoughtful explaination    Common labs    Common Labsle 9/1/21 9/1/21 9/2/21 9/2/21 9/3/21 9/3/21    0548 0548 0534 0534 0549 0549   Glucose  104 (A)  135 (A)  133 (A)   BUN  15  8  8   Creatinine  0.52 (A)  0.45 (A)  0.49 (A)   eGFR Non African Am  113  134  121   Sodium  139  134 (A)  135 (A)   Potassium  3.9  3.4 (A)  3.3 (A)   Chloride  107  104  105   Calcium  9.3  9.2  9.3   Albumin  3.00 (A)  3.20 (A)  2.90 (A)   Total Bilirubin  0.3       Alkaline Phosphatase  66       AST (SGOT)  15       ALT (SGPT)  9       WBC 8.81  10.51  10.28    Hemoglobin 9.8 (A)  9.6 (A)  9.8 (A)    Hematocrit 31.8 (A)  30.5 (A)  30.6 (A)    Platelets 273  287  281    (A) Abnormal value       Comments are available for some  flowsheets but are not being displayed.              Procedures      Assessment and Plan    Diagnoses and all orders for this visit:    1. Essential hypertension (Primary)    2. Mixed hyperlipidemia    3. Severe malnutrition (CMS/HCC)    4. Recurrent UTI    5. Late onset Alzheimer's dementia without behavioral disturbance (HCC)    6. HCAP (healthcare-associated pneumonia)    Discussed above issues and how this could affect her care  Discussed hospice as a possibility moving forward  Trying to reduce medications  Will continue current antibiotic  She does seem comfortable currently  They would like a lift to help as she is no longer able to lift herself    Greater than 30 minutes spent in discussion with her children about her current care          Follow Up   No follow-ups on file.  Patient was given instructions and counseling regarding her condition or for health maintenance advice. Please see specific information pulled into the AVS if appropriate.

## 2021-09-17 NOTE — TELEPHONE ENCOUNTER
Can we start orders for hydraulic lift and would it be an order at [place] or someplace  Else?

## 2021-09-17 NOTE — OUTREACH NOTE
COPD/PN Week 2 Survey      Responses   [facility] patient discharged from?  [facility]   Does the patient have one of the following disease processes/diagnoses(primary or secondary)?  COPD/Pneumonia   Was the primary reason for admission:  Pneumonia   Week 2 attempt successful?  Yes   Call start time  0904   Call end time  0916   Discharge diagnosis  Possible pneumonia  Recent UTI  Dementia   Is patient permission given to speak with other caregiver?  Yes   List who call center can speak with  DTR-Anetra   Person spoke with today (if not patient) and relationship  Anetra   Meds reviewed with patient/caregiver?  Yes   Is the patient having any side effects they believe may be caused by any medication additions or changes?  No   Does the patient have all medications ordered at discharge?  Yes   Is the patient taking all medications as directed (includes completed medication regime)?  Yes   Medication comments  Dtr states she is really confused, thinks her pneumonia is getting worse.    Does the patient have a primary care provider?   Yes   Does the patient have an appointment with their PCP or specialist within 7 days of discharge?  Yes   Comments regarding PCP  States had Telehealth apptment this week, Have her on comfort care no.    Has the patient kept scheduled appointments due by today?  N/A   Comments  PCP was to help with getting Rajinder lift.    What is the Home health agency?   [facility]   Has home health visited the patient within 72 hours of discharge?  Yes   Home health comments  HH comes once a week.    Pulse Ox monitoring  None   Psychosocial issues?  No   Did the patient receive a copy of their discharge instructions?  Yes   Nursing interventions  Reviewed instructions with patient   What is the patient's perception of their health status since discharge?  Worsening [States PCP has placed her on comfort care. ]   If the patient is a current smoker, are they able to teach back resources for cessation?   Not a smoker   Is the patient/caregiver able to teach back the hierarchy of who to call/visit for symptoms/problems? PCP, Specialist, Home health nurse, Urgent Care, ED, 911  Yes   Is the patient/caregiver able to teach back signs and symptoms of worsening condition:  Fever/chills, Shortness of breath, Chest pain   Is the patient/caregiver able to teach back importance of completing antibiotic course of treatment?  Yes   Week 2 call completed?  Yes   Wrap up additional comments  States they have her mother on comfort care. States she is getting worse. States they will probably not go back to hospital again.           Shelley Simon, AIDEE

## 2021-09-29 NOTE — PROGRESS NOTES
Chief Complaint  Follow up for pneumonia    Subjective          Noelle Nazario presents to [facility]  History of Present Illness  Patient understanding that this is a telehealth visit.  I cannot perform a full physical exam, therefore something might be missed, or patient may need to come into the office for further evaluation.  Patient is understanding and consents to this type of visit.  Doximity    Talking with her Daughter who is primary historian    A few days ago she had a possible TIA  She wasn't able to respond, jerking her right leg, not able to understand anything  Tylenol and ibuprofen seemed to ease her pain and she went to sleep and when she woke up seemed normal again    They called in hospice when this happened  They have started doing baths, meds and gave them the end of life box in the fridge    Was constipated and then they used laxatives and juices    Not eating or drinking as much    Her breathing seems a little more shallow than normal    Objective   Vital Signs:   There were no vitals taken for this visit.    Physical Exam   Result Review :   No exam as visit done with her daughter      Common labs    Common Labsle 9/1/21 9/1/21 9/2/21 9/2/21 9/3/21 9/3/21    0548 0548 0534 0534 0549 0549   Glucose  104 (A)  135 (A)  133 (A)   BUN  15  8  8   Creatinine  0.52 (A)  0.45 (A)  0.49 (A)   eGFR Non African Am  113  134  121   Sodium  139  134 (A)  135 (A)   Potassium  3.9  3.4 (A)  3.3 (A)   Chloride  107  104  105   Calcium  9.3  9.2  9.3   Albumin  3.00 (A)  3.20 (A)  2.90 (A)   Total Bilirubin  0.3       Alkaline Phosphatase  66       AST (SGOT)  15       ALT (SGPT)  9       WBC 8.81  10.51  10.28    Hemoglobin 9.8 (A)  9.6 (A)  9.8 (A)    Hematocrit 31.8 (A)  30.5 (A)  30.6 (A)    Platelets 273  287  281    (A) Abnormal value       Comments are available for some flowsheets but are not being displayed.              Procedures      Assessment  and Plan    Diagnoses and all orders for this visit:    1. End of life care (Primary)    2. Pneumonia due to infectious organism, unspecified laterality, unspecified part of lung    3. Dementia without behavioral disturbance, unspecified dementia type (HCC)    continue all care as given  Especially with bathing, etc  Cont current meds for now  Will work with [facility]          Follow Up   Return in about 2 weeks (around 10/13/2021).  Patient was given instructions and counseling regarding her condition or for health maintenance advice. Please see specific information pulled into the AVS if appropriate.     14 min spent in discussion with patients daughter about current issues

## 2021-10-15 PROBLEM — F03.918 DEMENTIA WITH BEHAVIORAL DISTURBANCE (HCC): Status: ACTIVE | Noted: 2021-01-01

## 2021-10-15 PROBLEM — R41.82 ALTERED MENTAL STATUS: Status: ACTIVE | Noted: 2021-01-01

## 2021-10-15 NOTE — PROGRESS NOTES
Chief Complaint  Follow up for dementia and pneumonia    Subjective          Noelle Nazario presents to [facility]  History of Present Illness  Patient understanding that this is a telehealth visit.  I cannot perform a full physical exam, therefore something might be missed, or patient may need to come into the office for further evaluation.  Patient is understanding and consents to this type of visit.  Mrs. Nazario's daughter and son were present for the visit and they helped with her history    She is still not able to walk  For over two hours she was having full conversations with people who weren't present and some who have passed away.    Constipation has been significant lately  She required a manual disimpaction  They increased her stool softeners since then    She isn't eating well  She is doing ensures and has been drinking chocolate milk  She stopped prune juice    She isn't able to bear weight with standing now  She is using in the chair or bed but they are having to do a full lift    Did have some right hip pain  No known injury    Objective   Vital Signs:   There were no vitals taken for this visit.    Physical Exam   Result Review :       Gen: well-nourished, no acute distress  HENT: atraumatic, normocephalic  Eyes: extraocular movements intact, no scleral icterus  Lung: breathing comfortably, no cough  Skin: no visible rash, no lesions  Psych: normal mood and affect  Sitting comfortably in her chair some difficulty with answering questions      Common labs    Common Labsle 9/1/21 9/1/21 9/2/21 9/2/21 9/3/21 9/3/21    0548 0548 0534 0534 0549 0549   Glucose  104 (A)  135 (A)  133 (A)   BUN  15  8  8   Creatinine  0.52 (A)  0.45 (A)  0.49 (A)   eGFR Non African Am  113  134  121   Sodium  139  134 (A)  135 (A)   Potassium  3.9  3.4 (A)  3.3 (A)   Chloride  107  104  105   Calcium  9.3  9.2  9.3   Albumin  3.00 (A)  3.20 (A)  2.90 (A)   Total Bilirubin   0.3       Alkaline Phosphatase  66       AST (SGOT)  15       ALT (SGPT)  9       WBC 8.81  10.51  10.28    Hemoglobin 9.8 (A)  9.6 (A)  9.8 (A)    Hematocrit 31.8 (A)  30.5 (A)  30.6 (A)    Platelets 273  287  281    (A) Abnormal value       Comments are available for some flowsheets but are not being displayed.              Procedures      Assessment and Plan    Diagnoses and all orders for this visit:    1. Dementia with behavioral disturbance, unspecified dementia type (HCC) (Primary)  Comments:  Stable continue to work with hospice no med changes indicated at this time    2. Altered mental status, unspecified altered mental status type  Comments:  This is likely to be progressive she is doing well currently    3. Influenza vaccine needed  Comments:  Discussed that it is okay to hold off on this for now as it is difficult to get her out of her house and I know of no way to bring the vaccine to her    4. Body aches  Comments:  Will try Voltaren to see if this helps    Other orders  -     Diclofenac Sodium (Voltaren) 1 % gel gel; Apply 4 g topically to the appropriate area as directed 4 (Four) Times a Day As Needed (hand pain).  Dispense: 100 g; Refill: 1    Greater than 25 minutes spent in discussion with family about her current issues          Follow Up   Return in about 1 month (around 11/15/2021).  Patient was given instructions and counseling regarding her condition or for health maintenance advice. Please see specific information pulled into the AVS if appropriate.

## 2021-12-06 ENCOUNTER — TELEPHONE (OUTPATIENT)
Dept: INTERNAL MEDICINE | Facility: CLINIC | Age: 81
End: 2021-12-06

## 2021-12-06 NOTE — TELEPHONE ENCOUNTER
Caller: JUAN FRANCISCO     Relationship: [facility] [employer] [facility]     Best call back number: [phone]    What is the best time to reach you: ANY     Who are you requesting to speak with (clinical staff, provider,  specific staff member): CLINICAL     What was the call regarding: JUAN FRANCISCO CALLED IN STATING THAT PATIENTS DEATH CERTIFICATE WAS SENT OVER ON  AND HASN'T BEEN SIGNED YET JUAN FRANCISCO WOULD LIKE A CALLBACK FROM NURSE.     Do you require a callback:YES

## 2023-06-07 NOTE — OUTREACH NOTE
COPD/PN Week 2 Survey      Responses   [facility] patient discharged from?  [facility]   Does the patient have one of the following disease processes/diagnoses(primary or secondary)?  COPD/Pneumonia   Was the primary reason for admission:  Pneumonia   Week 2 attempt successful?  No   Unsuccessful attempts  Attempt 1          Zaira Rosales RN   Xeljanz Counseling: I discussed with the patient the risks of Xeljanz therapy including increased risk of infection, liver issues, headache, diarrhea, or cold symptoms. Live vaccines should be avoided. They were instructed to call if they have any problems.

## 2024-12-02 NOTE — PROGRESS NOTES
Transitional Care Follow Up Visit  Shine Nazario is a 80 y.o. female who presents for a transitional care management visit.    Within 48 business hours after discharge our office contacted her via telephone to coordinate her care and needs.      I reviewed and discussed the details of that call along with the discharge summary, hospital problems, inpatient lab results, inpatient diagnostic studies, and consultation reports with Noelle.     Current outpatient and discharge medications have been reconciled for the patient.  Reviewed by: Marquita Cook MD      Date of TCM Phone Call 9/3/2021   [facility]   Date of Admission 8/31/2021   Date of Discharge 9/3/2021   Discharge Disposition [facility]-Health Care [facility] [place] [facility]       Chief Complaint  No chief complaint on file.   Follow up after hospitalization for pneumonia    Shine Nazario presents to [facility]  History of Present Illness    Patient understanding that this is a telehealth visit.  I cannot perform a full physical exam, therefore something might be missed, or patient may need to come into the office for further evaluation.  Patient is understanding and consents to this type of visit.  Doxmitiy    Reviewed d/c summary    States that she is breathing well  No pain  She is dealing with some hand pain  Doesn't appear to have bruised it at all  Hand is swollen but she does have movement of it    Saturday was nonresponsive, gray in color and white around her mouth, was like that all day  Sunday was a little better, she started talking and sitting up    At this point she isn't able to walk anymore  This is basically since she has been discharged from the hospital  She is also unable to get up from the chair and cannot bear weight while standing at all    She is completely incontient at this this point  She is a full weight transfer as she can't stand to bear her own  weight.    They have stopped macrobid while being on the cefdinir    She is c/o headaches and a sore neck        Objective   Vital Signs:   There were no vitals taken for this visit.    Physical Exam   Result Review :     Gen: well-nourished, no acute distress  HENT: atraumatic, normocephalic  Eyes: extraocular movements intact, no scleral icterus  Lung: breathing comfortably, no cough  Skin: no visible rash, no lesions  Psych: normal mood and affect  Does have some bilateral hand swelling  Her fingers are dusky on the inside  Sitting in arm chair  Common labs    Common Labsle 9/1/21 9/1/21 9/2/21 9/2/21 9/3/21 9/3/21    0548 0548 0534 0534 0549 0549   Glucose  104 (A)  135 (A)  133 (A)   BUN  15  8  8   Creatinine  0.52 (A)  0.45 (A)  0.49 (A)   eGFR Non African Am  113  134  121   Sodium  139  134 (A)  135 (A)   Potassium  3.9  3.4 (A)  3.3 (A)   Chloride  107  104  105   Calcium  9.3  9.2  9.3   Albumin  3.00 (A)  3.20 (A)  2.90 (A)   Total Bilirubin  0.3       Alkaline Phosphatase  66       AST (SGOT)  15       ALT (SGPT)  9       WBC 8.81  10.51  10.28    Hemoglobin 9.8 (A)  9.6 (A)  9.8 (A)    Hematocrit 31.8 (A)  30.5 (A)  30.6 (A)    Platelets 273  287  281    (A) Abnormal value       Comments are available for some flowsheets but are not being displayed.              Procedures      Assessment and Plan    Diagnoses and all orders for this visit:    1. End of life care (Primary)  -     Patient Lift    2. Pneumonia due to infectious organism, unspecified laterality, unspecified part of lung  Comments:  Continue current medications    3. Immobility  Comments:  Will order left for family to move her  Orders:  -     Patient Lift             Had a long discussion with patient's family about the fact that she is now entering end-of-life care and that hospice would likely be beneficial for them however we do not have to do this if we do not want to I can help with medications as needed will continue to decrease  pill burden.    Greater than 25 minutes spent in discussion with patient and her daughter and son about current medical issues    Follow Up   Return in about 1 week (around 9/15/2021).  Patient was given instructions and counseling regarding her condition or for health maintenance advice. Please see specific information pulled into the AVS if appropriate.        Yes